# Patient Record
Sex: MALE | Race: WHITE | Employment: OTHER | URBAN - METROPOLITAN AREA
[De-identification: names, ages, dates, MRNs, and addresses within clinical notes are randomized per-mention and may not be internally consistent; named-entity substitution may affect disease eponyms.]

---

## 2018-02-16 PROBLEM — F17.200 TOBACCO USE DISORDER: Status: ACTIVE | Noted: 2018-02-16

## 2018-02-16 PROBLEM — R06.02 SOB (SHORTNESS OF BREATH): Status: ACTIVE | Noted: 2018-02-16

## 2018-02-16 PROBLEM — E78.2 MIXED HYPERLIPIDEMIA: Status: ACTIVE | Noted: 2018-02-16

## 2018-02-16 PROBLEM — R07.9 CHEST PAIN: Status: ACTIVE | Noted: 2018-02-16

## 2018-02-16 PROBLEM — I10 ESSENTIAL HYPERTENSION: Status: ACTIVE | Noted: 2018-02-16

## 2018-02-16 PROBLEM — E11.9 DM (DIABETES MELLITUS), TYPE 2 (HCC): Status: ACTIVE | Noted: 2018-02-16

## 2018-02-16 PROBLEM — I31.39 PERICARDIAL EFFUSION: Status: ACTIVE | Noted: 2018-02-16

## 2018-02-16 PROBLEM — E03.9 HYPOTHYROIDISM: Status: ACTIVE | Noted: 2018-02-16

## 2018-02-16 PROBLEM — E87.1 HYPONATREMIA: Status: ACTIVE | Noted: 2018-02-16

## 2018-02-18 PROBLEM — R07.9 ACUTE CHEST PAIN: Status: RESOLVED | Noted: 2018-02-16 | Resolved: 2018-02-18

## 2018-02-18 PROBLEM — R06.02 SOB (SHORTNESS OF BREATH): Status: RESOLVED | Noted: 2018-02-16 | Resolved: 2018-02-18

## 2018-02-18 PROBLEM — E87.1 HYPONATREMIA: Status: RESOLVED | Noted: 2018-02-16 | Resolved: 2018-02-18

## 2018-03-30 PROBLEM — N39.0 UTI (URINARY TRACT INFECTION): Status: ACTIVE | Noted: 2018-03-30

## 2018-03-30 PROBLEM — A41.9 SEPSIS (HCC): Status: ACTIVE | Noted: 2018-03-30

## 2018-03-30 PROBLEM — N17.9 ARF (ACUTE RENAL FAILURE) (HCC): Status: ACTIVE | Noted: 2018-03-30

## 2018-03-31 PROBLEM — E44.1 MILD MALNUTRITION (HCC): Chronic | Status: ACTIVE | Noted: 2018-03-31

## 2018-04-06 PROBLEM — N05.7 CRESCENTIC GLOMERULONEPHRITIS: Status: ACTIVE | Noted: 2018-04-06

## 2018-04-10 PROBLEM — I31.39 PERICARDIAL EFFUSION: Status: RESOLVED | Noted: 2018-02-16 | Resolved: 2018-04-10

## 2018-04-10 PROBLEM — E44.1 MILD MALNUTRITION (HCC): Chronic | Status: RESOLVED | Noted: 2018-03-31 | Resolved: 2018-04-10

## 2018-04-13 ENCOUNTER — HOSPITAL ENCOUNTER (OUTPATIENT)
Dept: ONCOLOGY | Age: 68
Discharge: HOME | End: 2018-04-13
Attending: INTERNAL MEDICINE | Admitting: INTERNAL MEDICINE

## 2018-04-13 VITALS
TEMPERATURE: 97.1 F | RESPIRATION RATE: 16 BRPM | SYSTOLIC BLOOD PRESSURE: 143 MMHG | DIASTOLIC BLOOD PRESSURE: 79 MMHG | HEART RATE: 87 BPM | WEIGHT: 204 LBS | BODY MASS INDEX: 30.21 KG/M2 | HEIGHT: 69 IN

## 2018-04-13 RX ORDER — DIPHENHYDRAMINE HCL 25 MG
25 TABLET ORAL ONCE
Status: COMPLETED | OUTPATIENT
Start: 2018-04-13 | End: 2018-04-13

## 2018-04-13 RX ORDER — ACETAMINOPHEN 325 MG/1
650 TABLET ORAL ONCE
Status: COMPLETED | OUTPATIENT
Start: 2018-04-13 | End: 2018-04-13

## 2018-04-13 RX ORDER — METHYLPREDNISOLONE SODIUM SUCCINATE 125 MG/2ML
80 INJECTION, POWDER, LYOPHILIZED, FOR SOLUTION INTRAMUSCULAR; INTRAVENOUS ONCE
Status: COMPLETED | OUTPATIENT
Start: 2018-04-13 | End: 2018-04-13

## 2018-04-13 RX ADMIN — ACETAMINOPHEN 650 MG: 325 TABLET ORAL at 08:29

## 2018-04-13 RX ADMIN — Medication 25 MG: at 08:30

## 2018-04-13 RX ADMIN — METHYLPREDNISOLONE SODIUM SUCCINATE 80 MG: 125 INJECTION, POWDER, LYOPHILIZED, FOR SOLUTION INTRAMUSCULAR; INTRAVENOUS at 08:30

## 2018-04-13 ASSESSMENT — PAIN SCALES - GENERAL: PAINLEVEL_OUTOF10: 0

## 2018-04-13 ASSESSMENT — PAIN - FUNCTIONAL ASSESSMENT: PAIN_FUNCTIONAL_ASSESSMENT: 0-10

## 2018-04-20 ENCOUNTER — HOSPITAL ENCOUNTER (OUTPATIENT)
Dept: ONCOLOGY | Age: 68
Discharge: HOME | End: 2018-04-30
Attending: INTERNAL MEDICINE | Admitting: INTERNAL MEDICINE

## 2018-04-20 VITALS
HEART RATE: 96 BPM | TEMPERATURE: 97 F | HEIGHT: 69 IN | RESPIRATION RATE: 18 BRPM | SYSTOLIC BLOOD PRESSURE: 141 MMHG | DIASTOLIC BLOOD PRESSURE: 81 MMHG | WEIGHT: 205 LBS | BODY MASS INDEX: 30.36 KG/M2

## 2018-04-20 LAB
A/G RATIO: 0.8 (ref 1.1–2.2)
ALBUMIN SERPL-MCNC: 2.7 G/DL (ref 3.4–5)
ALP BLD-CCNC: 180 U/L (ref 40–129)
ALT SERPL-CCNC: 26 U/L (ref 10–40)
ANION GAP SERPL CALCULATED.3IONS-SCNC: 13 MMOL/L (ref 3–16)
ANISOCYTOSIS: ABNORMAL
AST SERPL-CCNC: 12 U/L (ref 15–37)
BASOPHILS ABSOLUTE: 0 K/UL (ref 0–0.2)
BASOPHILS RELATIVE PERCENT: 0 %
BILIRUB SERPL-MCNC: 0.5 MG/DL (ref 0–1)
BUN BLDV-MCNC: 80 MG/DL (ref 7–20)
BURR CELLS: ABNORMAL
CALCIUM SERPL-MCNC: 8.6 MG/DL (ref 8.3–10.6)
CHLORIDE BLD-SCNC: 99 MMOL/L (ref 99–110)
CO2: 24 MMOL/L (ref 21–32)
CREAT SERPL-MCNC: 2.9 MG/DL (ref 0.8–1.3)
EOSINOPHILS ABSOLUTE: 0 K/UL (ref 0–0.6)
EOSINOPHILS RELATIVE PERCENT: 0 %
GFR AFRICAN AMERICAN: 26
GFR NON-AFRICAN AMERICAN: 22
GLOBULIN: 3.2 G/DL
GLUCOSE BLD-MCNC: 103 MG/DL (ref 70–99)
HCT VFR BLD CALC: 22.3 % (ref 40.5–52.5)
HEMOGLOBIN: 7.3 G/DL (ref 13.5–17.5)
HOWELL-JOLLY BODIES: ABNORMAL
HYPOCHROMIA: ABNORMAL
LYMPHOCYTES ABSOLUTE: 0.4 K/UL (ref 1–5.1)
LYMPHOCYTES RELATIVE PERCENT: 2 %
MACROCYTES: ABNORMAL
MCH RBC QN AUTO: 26.4 PG (ref 26–34)
MCHC RBC AUTO-ENTMCNC: 33 G/DL (ref 31–36)
MCV RBC AUTO: 80.1 FL (ref 80–100)
MICROCYTES: ABNORMAL
MONOCYTES ABSOLUTE: 0.4 K/UL (ref 0–1.3)
MONOCYTES RELATIVE PERCENT: 2 %
NEUTROPHILS ABSOLUTE: 17.8 K/UL (ref 1.7–7.7)
NEUTROPHILS RELATIVE PERCENT: 96 %
OVALOCYTES: ABNORMAL
PDW BLD-RTO: 20.6 % (ref 12.4–15.4)
PLATELET # BLD: 280 K/UL (ref 135–450)
PLATELET SLIDE REVIEW: ADEQUATE
PMV BLD AUTO: 8 FL (ref 5–10.5)
POIKILOCYTES: ABNORMAL
POLYCHROMASIA: ABNORMAL
POTASSIUM SERPL-SCNC: 4.7 MMOL/L (ref 3.5–5.1)
RBC # BLD: 2.78 M/UL (ref 4.2–5.9)
SCHISTOCYTES: ABNORMAL
SLIDE REVIEW: ABNORMAL
SODIUM BLD-SCNC: 136 MMOL/L (ref 136–145)
TARGET CELLS: ABNORMAL
TOTAL PROTEIN: 5.9 G/DL (ref 6.4–8.2)
WBC # BLD: 18.5 K/UL (ref 4–11)

## 2018-04-20 RX ORDER — METHYLPREDNISOLONE SODIUM SUCCINATE 125 MG/2ML
80 INJECTION, POWDER, LYOPHILIZED, FOR SOLUTION INTRAMUSCULAR; INTRAVENOUS DAILY
Status: DISCONTINUED | OUTPATIENT
Start: 2018-04-20 | End: 2018-04-22 | Stop reason: HOSPADM

## 2018-04-20 RX ORDER — ACETAMINOPHEN 325 MG/1
650 TABLET ORAL ONCE
Status: COMPLETED | OUTPATIENT
Start: 2018-04-20 | End: 2018-04-20

## 2018-04-20 RX ORDER — DIPHENHYDRAMINE HCL 25 MG
25 TABLET ORAL ONCE
Status: COMPLETED | OUTPATIENT
Start: 2018-04-20 | End: 2018-04-20

## 2018-04-20 RX ADMIN — ACETAMINOPHEN 650 MG: 325 TABLET ORAL at 10:56

## 2018-04-20 RX ADMIN — METHYLPREDNISOLONE SODIUM SUCCINATE 80 MG: 125 INJECTION, POWDER, LYOPHILIZED, FOR SOLUTION INTRAMUSCULAR; INTRAVENOUS at 10:56

## 2018-04-20 RX ADMIN — Medication 25 MG: at 10:55

## 2018-04-20 ASSESSMENT — PAIN - FUNCTIONAL ASSESSMENT: PAIN_FUNCTIONAL_ASSESSMENT: 0-10

## 2018-04-20 ASSESSMENT — PAIN SCALES - GENERAL: PAINLEVEL_OUTOF10: 0

## 2018-04-27 ENCOUNTER — HOSPITAL ENCOUNTER (OUTPATIENT)
Dept: ONCOLOGY | Age: 68
Discharge: HOME | End: 2018-04-28
Admitting: INTERNAL MEDICINE

## 2018-04-27 VITALS
RESPIRATION RATE: 16 BRPM | BODY MASS INDEX: 30.36 KG/M2 | HEIGHT: 69 IN | DIASTOLIC BLOOD PRESSURE: 90 MMHG | TEMPERATURE: 97.7 F | WEIGHT: 205 LBS | HEART RATE: 95 BPM | SYSTOLIC BLOOD PRESSURE: 139 MMHG

## 2018-04-27 DIAGNOSIS — N05.7 CRESCENTIC GLOMERULONEPHRITIS: ICD-10-CM

## 2018-04-27 DIAGNOSIS — D63.1 ANEMIA OF CHRONIC RENAL FAILURE, UNSPECIFIED CKD STAGE: ICD-10-CM

## 2018-04-27 DIAGNOSIS — N18.9 ANEMIA OF CHRONIC RENAL FAILURE, UNSPECIFIED CKD STAGE: ICD-10-CM

## 2018-04-27 LAB
A/G RATIO: 0.9 (ref 1.1–2.2)
ALBUMIN SERPL-MCNC: 2.7 G/DL (ref 3.4–5)
ALP BLD-CCNC: 148 U/L (ref 40–129)
ALT SERPL-CCNC: 20 U/L (ref 10–40)
ANION GAP SERPL CALCULATED.3IONS-SCNC: 14 MMOL/L (ref 3–16)
AST SERPL-CCNC: 10 U/L (ref 15–37)
BASOPHILS ABSOLUTE: 0 K/UL (ref 0–0.2)
BASOPHILS RELATIVE PERCENT: 0.1 %
BILIRUB SERPL-MCNC: 0.5 MG/DL (ref 0–1)
BUN BLDV-MCNC: 69 MG/DL (ref 7–20)
CALCIUM SERPL-MCNC: 8.3 MG/DL (ref 8.3–10.6)
CHLORIDE BLD-SCNC: 102 MMOL/L (ref 99–110)
CO2: 23 MMOL/L (ref 21–32)
CREAT SERPL-MCNC: 3.1 MG/DL (ref 0.8–1.3)
EOSINOPHILS ABSOLUTE: 0.1 K/UL (ref 0–0.6)
EOSINOPHILS RELATIVE PERCENT: 0.8 %
FERRITIN: 770.3 NG/ML (ref 30–400)
FOLATE: 14.82 NG/ML (ref 4.78–24.2)
GFR AFRICAN AMERICAN: 24
GFR NON-AFRICAN AMERICAN: 20
GLOBULIN: 2.9 G/DL
GLUCOSE BLD-MCNC: 122 MG/DL (ref 70–99)
HCT VFR BLD CALC: 21.4 % (ref 40.5–52.5)
HEMOGLOBIN: 7 G/DL (ref 13.5–17.5)
IRON SATURATION: 21 % (ref 20–50)
IRON: 45 UG/DL (ref 59–158)
LYMPHOCYTES ABSOLUTE: 1.2 K/UL (ref 1–5.1)
LYMPHOCYTES RELATIVE PERCENT: 11.5 %
MCH RBC QN AUTO: 27.2 PG (ref 26–34)
MCHC RBC AUTO-ENTMCNC: 33 G/DL (ref 31–36)
MCV RBC AUTO: 82.5 FL (ref 80–100)
MONOCYTES ABSOLUTE: 0.7 K/UL (ref 0–1.3)
MONOCYTES RELATIVE PERCENT: 7.1 %
NEUTROPHILS ABSOLUTE: 8.1 K/UL (ref 1.7–7.7)
NEUTROPHILS RELATIVE PERCENT: 80.5 %
PDW BLD-RTO: 24.7 % (ref 12.4–15.4)
PLATELET # BLD: 242 K/UL (ref 135–450)
PMV BLD AUTO: 7.8 FL (ref 5–10.5)
POTASSIUM SERPL-SCNC: 5.5 MMOL/L (ref 3.5–5.1)
RBC # BLD: 2.59 M/UL (ref 4.2–5.9)
SODIUM BLD-SCNC: 139 MMOL/L (ref 136–145)
TOTAL IRON BINDING CAPACITY: 213 UG/DL (ref 260–445)
TOTAL PROTEIN: 5.6 G/DL (ref 6.4–8.2)
VITAMIN B-12: 485 PG/ML (ref 211–911)
WBC # BLD: 10 K/UL (ref 4–11)

## 2018-04-27 RX ORDER — METHYLPREDNISOLONE SODIUM SUCCINATE 125 MG/2ML
80 INJECTION, POWDER, LYOPHILIZED, FOR SOLUTION INTRAMUSCULAR; INTRAVENOUS ONCE
Status: COMPLETED | OUTPATIENT
Start: 2018-04-27 | End: 2018-04-27

## 2018-04-27 RX ORDER — ACETAMINOPHEN 325 MG/1
650 TABLET ORAL ONCE
Status: COMPLETED | OUTPATIENT
Start: 2018-04-27 | End: 2018-04-27

## 2018-04-27 RX ORDER — DIPHENHYDRAMINE HCL 25 MG
25 TABLET ORAL ONCE
Status: COMPLETED | OUTPATIENT
Start: 2018-04-27 | End: 2018-04-27

## 2018-04-27 RX ADMIN — Medication 25 MG: at 09:04

## 2018-04-27 RX ADMIN — METHYLPREDNISOLONE SODIUM SUCCINATE 80 MG: 125 INJECTION, POWDER, LYOPHILIZED, FOR SOLUTION INTRAMUSCULAR; INTRAVENOUS at 09:05

## 2018-04-27 RX ADMIN — ACETAMINOPHEN 650 MG: 325 TABLET ORAL at 09:04

## 2018-04-27 ASSESSMENT — PAIN SCALES - GENERAL
PAINLEVEL_OUTOF10: 0
PAINLEVEL_OUTOF10: 0

## 2018-04-29 PROBLEM — N39.0 UTI (URINARY TRACT INFECTION): Status: RESOLVED | Noted: 2018-03-30 | Resolved: 2018-04-29

## 2018-05-01 ENCOUNTER — HOSPITAL ENCOUNTER (OUTPATIENT)
Dept: ONCOLOGY | Age: 68
Discharge: HOME | End: 2018-05-31
Attending: INTERNAL MEDICINE | Admitting: INTERNAL MEDICINE

## 2018-05-03 PROBLEM — N18.9 CKD (CHRONIC KIDNEY DISEASE): Status: ACTIVE | Noted: 2018-05-03

## 2018-05-03 PROBLEM — I21.4 NSTEMI (NON-ST ELEVATED MYOCARDIAL INFARCTION) (HCC): Status: ACTIVE | Noted: 2018-05-03

## 2018-05-25 ENCOUNTER — HOSPITAL ENCOUNTER (OUTPATIENT)
Dept: ONCOLOGY | Age: 68
Discharge: HOME | End: 2018-05-26
Admitting: INTERNAL MEDICINE

## 2018-05-25 ENCOUNTER — HOSPITAL ENCOUNTER (OUTPATIENT)
Dept: OTHER | Age: 68
Discharge: HOME | End: 2018-05-25
Attending: INTERNAL MEDICINE | Admitting: INTERNAL MEDICINE

## 2018-05-25 VITALS
WEIGHT: 205 LBS | BODY MASS INDEX: 29.35 KG/M2 | HEIGHT: 70 IN | TEMPERATURE: 98.4 F | SYSTOLIC BLOOD PRESSURE: 147 MMHG | RESPIRATION RATE: 18 BRPM | HEART RATE: 64 BPM | DIASTOLIC BLOOD PRESSURE: 76 MMHG

## 2018-05-25 DIAGNOSIS — N18.9 ANEMIA OF CHRONIC RENAL FAILURE, UNSPECIFIED CKD STAGE: ICD-10-CM

## 2018-05-25 DIAGNOSIS — N05.7 CRESCENTIC GLOMERULONEPHRITIS: ICD-10-CM

## 2018-05-25 DIAGNOSIS — D63.1 ANEMIA OF CHRONIC RENAL FAILURE, UNSPECIFIED CKD STAGE: ICD-10-CM

## 2018-05-25 LAB
ALBUMIN SERPL-MCNC: 3.6 G/DL (ref 3.4–5)
ANION GAP SERPL CALCULATED.3IONS-SCNC: 18 MMOL/L (ref 3–16)
BACTERIA: ABNORMAL /HPF
BILIRUBIN URINE: NEGATIVE
BLOOD, URINE: ABNORMAL
BUN BLDV-MCNC: 84 MG/DL (ref 7–20)
CALCIUM SERPL-MCNC: 8.8 MG/DL (ref 8.3–10.6)
CHLORIDE BLD-SCNC: 92 MMOL/L (ref 99–110)
CLARITY: CLEAR
CO2: 27 MMOL/L (ref 21–32)
COLOR: YELLOW
CREAT SERPL-MCNC: 3.1 MG/DL (ref 0.8–1.3)
CREATININE URINE: 35.2 MG/DL (ref 39–259)
EPITHELIAL CELLS, UA: ABNORMAL /HPF
GFR AFRICAN AMERICAN: 24
GFR NON-AFRICAN AMERICAN: 20
GLUCOSE BLD-MCNC: 166 MG/DL (ref 70–99)
GLUCOSE URINE: NEGATIVE MG/DL
HCT VFR BLD CALC: 30.5 % (ref 40.5–52.5)
HEMOGLOBIN: 10.1 G/DL (ref 13.5–17.5)
KETONES, URINE: NEGATIVE MG/DL
LEUKOCYTE ESTERASE, URINE: NEGATIVE
MICROSCOPIC EXAMINATION: YES
NITRITE, URINE: NEGATIVE
PH UA: 5.5
PHOSPHORUS: 3.6 MG/DL (ref 2.5–4.9)
POTASSIUM SERPL-SCNC: 5.3 MMOL/L (ref 3.5–5.1)
PROTEIN PROTEIN: 50 MG/DL
PROTEIN UA: 30 MG/DL
PROTEIN/CREAT RATIO: 1.4 MG/DL
RBC UA: ABNORMAL /HPF (ref 0–2)
SODIUM BLD-SCNC: 137 MMOL/L (ref 136–145)
SPECIFIC GRAVITY UA: 1.01
UROBILINOGEN, URINE: 0.2 E.U./DL
WBC UA: ABNORMAL /HPF (ref 0–5)

## 2018-05-25 ASSESSMENT — PAIN - FUNCTIONAL ASSESSMENT: PAIN_FUNCTIONAL_ASSESSMENT: 0-10

## 2018-06-01 ENCOUNTER — HOSPITAL ENCOUNTER (OUTPATIENT)
Dept: ONCOLOGY | Age: 68
Discharge: HOME | End: 2018-06-30
Attending: INTERNAL MEDICINE | Admitting: INTERNAL MEDICINE

## 2018-06-07 ENCOUNTER — TELEPHONE (OUTPATIENT)
Dept: CARDIOTHORACIC SURGERY | Age: 68
End: 2018-06-07

## 2018-06-08 ENCOUNTER — HOSPITAL ENCOUNTER (OUTPATIENT)
Dept: OTHER | Age: 68
Discharge: HOME | End: 2018-06-08
Attending: INTERNAL MEDICINE | Admitting: INTERNAL MEDICINE

## 2018-06-08 LAB
ALBUMIN SERPL-MCNC: 3.5 G/DL (ref 3.4–5)
ANION GAP SERPL CALCULATED.3IONS-SCNC: 17 MMOL/L (ref 3–16)
BUN BLDV-MCNC: 83 MG/DL (ref 7–20)
CALCIUM SERPL-MCNC: 9 MG/DL (ref 8.3–10.6)
CHLORIDE BLD-SCNC: 97 MMOL/L (ref 99–110)
CO2: 28 MMOL/L (ref 21–32)
CREAT SERPL-MCNC: 2.9 MG/DL (ref 0.8–1.3)
GFR AFRICAN AMERICAN: 26
GFR NON-AFRICAN AMERICAN: 22
GLUCOSE BLD-MCNC: 161 MG/DL (ref 70–99)
PHOSPHORUS: 4.4 MG/DL (ref 2.5–4.9)
POTASSIUM SERPL-SCNC: 6.3 MMOL/L (ref 3.5–5.1)
SODIUM BLD-SCNC: 142 MMOL/L (ref 136–145)

## 2018-06-14 ENCOUNTER — TELEPHONE (OUTPATIENT)
Dept: CARDIOTHORACIC SURGERY | Age: 68
End: 2018-06-14

## 2018-06-14 DIAGNOSIS — R91.8 LUNG NODULES: Primary | ICD-10-CM

## 2018-06-22 ENCOUNTER — HOSPITAL ENCOUNTER (OUTPATIENT)
Dept: ONCOLOGY | Age: 68
Discharge: HOME | End: 2018-06-23
Admitting: INTERNAL MEDICINE

## 2018-06-22 LAB
HCT VFR BLD CALC: 34.8 % (ref 40.5–52.5)
HEMOGLOBIN: 11.7 G/DL (ref 13.5–17.5)

## 2018-06-25 ENCOUNTER — HOSPITAL ENCOUNTER (OUTPATIENT)
Dept: CT IMAGING | Age: 68
Discharge: HOME | End: 2018-06-25
Attending: THORACIC SURGERY (CARDIOTHORACIC VASCULAR SURGERY) | Admitting: THORACIC SURGERY (CARDIOTHORACIC VASCULAR SURGERY)

## 2018-06-25 DIAGNOSIS — R91.1 SOLITARY PULMONARY NODULE: ICD-10-CM

## 2018-06-25 DIAGNOSIS — R91.8 LUNG NODULES: ICD-10-CM

## 2018-07-01 ENCOUNTER — HOSPITAL ENCOUNTER (OUTPATIENT)
Dept: ONCOLOGY | Age: 68
Discharge: HOME | End: 2018-07-02
Attending: INTERNAL MEDICINE | Admitting: INTERNAL MEDICINE

## 2018-07-06 ENCOUNTER — HOSPITAL ENCOUNTER (OUTPATIENT)
Dept: ONCOLOGY | Age: 68
Discharge: HOME | End: 2018-07-07
Admitting: INTERNAL MEDICINE

## 2018-07-06 LAB
ALBUMIN SERPL-MCNC: 3.3 G/DL (ref 3.4–5)
ANION GAP SERPL CALCULATED.3IONS-SCNC: 14 MMOL/L (ref 3–16)
BUN BLDV-MCNC: 54 MG/DL (ref 7–20)
CALCIUM SERPL-MCNC: 9.7 MG/DL (ref 8.3–10.6)
CHLORIDE BLD-SCNC: 94 MMOL/L (ref 99–110)
CO2: 24 MMOL/L (ref 21–32)
CREAT SERPL-MCNC: 3.1 MG/DL (ref 0.8–1.3)
GFR AFRICAN AMERICAN: 24
GFR NON-AFRICAN AMERICAN: 20
GLUCOSE BLD-MCNC: 98 MG/DL (ref 70–99)
HCT VFR BLD CALC: 36 % (ref 40.5–52.5)
HEMOGLOBIN: 11.9 G/DL (ref 13.5–17.5)
PHOSPHORUS: 3.7 MG/DL (ref 2.5–4.9)
POTASSIUM SERPL-SCNC: 4.6 MMOL/L (ref 3.5–5.1)
SODIUM BLD-SCNC: 132 MMOL/L (ref 136–145)

## 2018-07-11 ENCOUNTER — TELEPHONE (OUTPATIENT)
Dept: CARDIOTHORACIC SURGERY | Age: 68
End: 2018-07-11

## 2018-07-20 ENCOUNTER — HOSPITAL ENCOUNTER (OUTPATIENT)
Dept: NURSING | Age: 68
Setting detail: INFUSION SERIES
Discharge: HOME | End: 2018-07-20
Payer: MEDICARE

## 2018-07-20 LAB
HCT VFR BLD CALC: 36.2 % (ref 40.5–52.5)
HEMOGLOBIN: 12.1 G/DL (ref 13.5–17.5)

## 2018-07-20 PROCEDURE — 85018 HEMOGLOBIN: CPT

## 2018-07-20 PROCEDURE — 36415 COLL VENOUS BLD VENIPUNCTURE: CPT

## 2018-07-20 PROCEDURE — 85014 HEMATOCRIT: CPT

## 2018-07-20 PROCEDURE — 99211 OFF/OP EST MAY X REQ PHY/QHP: CPT

## 2018-07-20 NOTE — PROGRESS NOTES
Patient did not need any injection of aranesp per his lab results. Left in stable condition ambulatory.

## 2018-08-03 ENCOUNTER — HOSPITAL ENCOUNTER (OUTPATIENT)
Dept: NURSING | Age: 68
Setting detail: INFUSION SERIES
Discharge: HOME | End: 2018-08-03
Payer: MEDICARE

## 2018-08-03 LAB
ALBUMIN SERPL-MCNC: 3.5 G/DL (ref 3.4–5)
ANION GAP SERPL CALCULATED.3IONS-SCNC: 11 MMOL/L (ref 3–16)
BUN BLDV-MCNC: 41 MG/DL (ref 7–20)
CALCIUM SERPL-MCNC: 9.6 MG/DL (ref 8.3–10.6)
CHLORIDE BLD-SCNC: 104 MMOL/L (ref 99–110)
CO2: 26 MMOL/L (ref 21–32)
CREAT SERPL-MCNC: 2.8 MG/DL (ref 0.8–1.3)
FERRITIN: 303.3 NG/ML (ref 30–400)
GFR AFRICAN AMERICAN: 27
GFR NON-AFRICAN AMERICAN: 23
GLUCOSE BLD-MCNC: 148 MG/DL (ref 70–99)
HCT VFR BLD CALC: 36.5 % (ref 40.5–52.5)
HEMOGLOBIN: 11.9 G/DL (ref 13.5–17.5)
IRON SATURATION: 24 % (ref 20–50)
IRON: 57 UG/DL (ref 59–158)
PHOSPHORUS: 4 MG/DL (ref 2.5–4.9)
POTASSIUM SERPL-SCNC: 4.8 MMOL/L (ref 3.5–5.1)
SODIUM BLD-SCNC: 141 MMOL/L (ref 136–145)
TOTAL IRON BINDING CAPACITY: 242 UG/DL (ref 260–445)

## 2018-08-03 PROCEDURE — 85014 HEMATOCRIT: CPT

## 2018-08-03 PROCEDURE — 80069 RENAL FUNCTION PANEL: CPT

## 2018-08-03 PROCEDURE — 85018 HEMOGLOBIN: CPT

## 2018-08-03 PROCEDURE — 99211 OFF/OP EST MAY X REQ PHY/QHP: CPT

## 2018-08-03 PROCEDURE — 83540 ASSAY OF IRON: CPT

## 2018-08-03 PROCEDURE — 36415 COLL VENOUS BLD VENIPUNCTURE: CPT

## 2018-08-03 PROCEDURE — 83550 IRON BINDING TEST: CPT

## 2018-08-03 PROCEDURE — 82728 ASSAY OF FERRITIN: CPT

## 2018-08-03 NOTE — PROGRESS NOTES
Hemoglobin 11.9 no aranesp today per orders patient informed and instructed to make appointment for 2 weeks from now.  Discharged in stable condition

## 2018-08-17 ENCOUNTER — HOSPITAL ENCOUNTER (OUTPATIENT)
Dept: NURSING | Age: 68
Setting detail: INFUSION SERIES
Discharge: HOME | End: 2018-08-17
Payer: MEDICARE

## 2018-08-17 LAB
HCT VFR BLD CALC: 36.3 % (ref 40.5–52.5)
HEMOGLOBIN: 11.9 G/DL (ref 13.5–17.5)

## 2018-08-17 PROCEDURE — 99211 OFF/OP EST MAY X REQ PHY/QHP: CPT

## 2018-08-17 PROCEDURE — 36415 COLL VENOUS BLD VENIPUNCTURE: CPT

## 2018-08-17 PROCEDURE — 85014 HEMATOCRIT: CPT

## 2018-08-17 PROCEDURE — 85018 HEMOGLOBIN: CPT

## 2018-08-17 NOTE — PROGRESS NOTES
Injection of aranesp not needed. Will schedule his next appointment in 2 weeks. Left ambulatory in stable condition.

## 2018-08-31 ENCOUNTER — HOSPITAL ENCOUNTER (OUTPATIENT)
Dept: NURSING | Age: 68
Setting detail: INFUSION SERIES
Discharge: HOME | End: 2018-08-31
Payer: MEDICARE

## 2018-08-31 LAB
ALBUMIN SERPL-MCNC: 3.5 G/DL (ref 3.4–5)
ANION GAP SERPL CALCULATED.3IONS-SCNC: 12 MMOL/L (ref 3–16)
BUN BLDV-MCNC: 36 MG/DL (ref 7–20)
CALCIUM SERPL-MCNC: 9.5 MG/DL (ref 8.3–10.6)
CHLORIDE BLD-SCNC: 101 MMOL/L (ref 99–110)
CO2: 26 MMOL/L (ref 21–32)
CREAT SERPL-MCNC: 2.7 MG/DL (ref 0.8–1.3)
GFR AFRICAN AMERICAN: 28
GFR NON-AFRICAN AMERICAN: 24
GLUCOSE BLD-MCNC: 187 MG/DL (ref 70–99)
HCT VFR BLD CALC: 37 % (ref 40.5–52.5)
HEMOGLOBIN: 12.1 G/DL (ref 13.5–17.5)
PHOSPHORUS: 3.7 MG/DL (ref 2.5–4.9)
POTASSIUM SERPL-SCNC: 4.7 MMOL/L (ref 3.5–5.1)
SODIUM BLD-SCNC: 139 MMOL/L (ref 136–145)

## 2018-08-31 PROCEDURE — 85014 HEMATOCRIT: CPT

## 2018-08-31 PROCEDURE — 36415 COLL VENOUS BLD VENIPUNCTURE: CPT

## 2018-08-31 PROCEDURE — 85018 HEMOGLOBIN: CPT

## 2018-08-31 PROCEDURE — 80069 RENAL FUNCTION PANEL: CPT

## 2018-08-31 PROCEDURE — 99211 OFF/OP EST MAY X REQ PHY/QHP: CPT

## 2018-08-31 NOTE — PROGRESS NOTES
Pt hemoglobin 12.1 no aranesp given as ordered. Patient instructed to call physician hasn't needed an injection x 3 months.  Verbalizes understanding discharged to home in stable condition

## 2022-04-06 ENCOUNTER — HOSPITAL ENCOUNTER (OUTPATIENT)
Age: 72
Discharge: HOME | End: 2022-04-06
Payer: MEDICARE

## 2022-04-06 ENCOUNTER — HOSPITAL ENCOUNTER (OUTPATIENT)
Dept: GENERAL RADIOLOGY | Age: 72
Discharge: HOME | End: 2022-04-06
Payer: MEDICARE

## 2022-04-06 DIAGNOSIS — N18.5 CHRONIC KIDNEY DISEASE, STAGE V (HCC): ICD-10-CM

## 2022-04-06 PROCEDURE — 87340 HEPATITIS B SURFACE AG IA: CPT

## 2022-04-06 PROCEDURE — 86706 HEP B SURFACE ANTIBODY: CPT

## 2022-04-06 PROCEDURE — 71046 X-RAY EXAM CHEST 2 VIEWS: CPT

## 2022-04-06 PROCEDURE — 86704 HEP B CORE ANTIBODY TOTAL: CPT

## 2022-04-07 LAB
HBV SURFACE AB TITR SER: <3.5 MIU/ML
HEPATITIS B SURFACE ANTIGEN INTERPRETATION: NORMAL

## 2022-04-08 LAB — HEPATITIS B CORE TOTAL ANTIBODY: NEGATIVE

## 2022-05-17 ENCOUNTER — HOSPITAL ENCOUNTER (INPATIENT)
Age: 72
LOS: 9 days | Discharge: NURSING, REHAB OR LONG-TERM CARE | DRG: 280 | End: 2022-05-26
Attending: EMERGENCY MEDICINE | Admitting: INTERNAL MEDICINE
Payer: MEDICARE

## 2022-05-17 ENCOUNTER — APPOINTMENT (OUTPATIENT)
Dept: CT IMAGING | Age: 72
DRG: 280 | End: 2022-05-17
Payer: MEDICARE

## 2022-05-17 ENCOUNTER — APPOINTMENT (OUTPATIENT)
Dept: GENERAL RADIOLOGY | Age: 72
DRG: 280 | End: 2022-05-17
Payer: MEDICARE

## 2022-05-17 DIAGNOSIS — I21.4 NSTEMI (NON-ST ELEVATED MYOCARDIAL INFARCTION) (HCC): Primary | ICD-10-CM

## 2022-05-17 DIAGNOSIS — D64.9 ANEMIA, UNSPECIFIED TYPE: ICD-10-CM

## 2022-05-17 DIAGNOSIS — E87.1 HYPONATREMIA: ICD-10-CM

## 2022-05-17 DIAGNOSIS — R53.1 GENERALIZED WEAKNESS: ICD-10-CM

## 2022-05-17 PROBLEM — R77.8 TROPONIN LEVEL ELEVATED: Status: ACTIVE | Noted: 2022-05-17

## 2022-05-17 PROBLEM — N18.5 CHRONIC KIDNEY DISEASE, STAGE 5 (HCC): Status: ACTIVE | Noted: 2018-05-03

## 2022-05-17 LAB
A/G RATIO: 0.5 (ref 1.1–2.2)
ABO/RH: NORMAL
ALBUMIN SERPL-MCNC: 2 G/DL (ref 3.4–5)
ALP BLD-CCNC: 402 U/L (ref 40–129)
ALT SERPL-CCNC: 17 U/L (ref 10–40)
ANION GAP SERPL CALCULATED.3IONS-SCNC: 8 MMOL/L (ref 3–16)
ANTIBODY SCREEN: NORMAL
APTT: 24 SEC (ref 26.2–38.6)
AST SERPL-CCNC: 27 U/L (ref 15–37)
BASOPHILS ABSOLUTE: 0.1 K/UL (ref 0–0.2)
BASOPHILS RELATIVE PERCENT: 0.6 %
BILIRUB SERPL-MCNC: 0.5 MG/DL (ref 0–1)
BUN BLDV-MCNC: 11 MG/DL (ref 7–20)
CALCIUM SERPL-MCNC: 7.9 MG/DL (ref 8.3–10.6)
CHLORIDE BLD-SCNC: 91 MMOL/L (ref 99–110)
CO2: 28 MMOL/L (ref 21–32)
CREAT SERPL-MCNC: 3.7 MG/DL (ref 0.8–1.3)
EOSINOPHILS ABSOLUTE: 0.4 K/UL (ref 0–0.6)
EOSINOPHILS RELATIVE PERCENT: 2.7 %
GFR AFRICAN AMERICAN: 20
GFR NON-AFRICAN AMERICAN: 16
GLUCOSE BLD-MCNC: 111 MG/DL (ref 70–99)
HCT VFR BLD CALC: 23.1 % (ref 40.5–52.5)
HEMOGLOBIN: 7.5 G/DL (ref 13.5–17.5)
LIPASE: 12 U/L (ref 13–60)
LYMPHOCYTES ABSOLUTE: 0.9 K/UL (ref 1–5.1)
LYMPHOCYTES RELATIVE PERCENT: 5.6 %
MAGNESIUM: 1.9 MG/DL (ref 1.8–2.4)
MCH RBC QN AUTO: 27.2 PG (ref 26–34)
MCHC RBC AUTO-ENTMCNC: 32.6 G/DL (ref 31–36)
MCV RBC AUTO: 83.5 FL (ref 80–100)
MONOCYTES ABSOLUTE: 1 K/UL (ref 0–1.3)
MONOCYTES RELATIVE PERCENT: 6.1 %
NEUTROPHILS ABSOLUTE: 13.4 K/UL (ref 1.7–7.7)
NEUTROPHILS RELATIVE PERCENT: 85 %
OCCULT BLOOD SCREENING: NORMAL
PDW BLD-RTO: 15.2 % (ref 12.4–15.4)
PLATELET # BLD: 354 K/UL (ref 135–450)
PMV BLD AUTO: 6.9 FL (ref 5–10.5)
POTASSIUM REFLEX MAGNESIUM: 3.1 MMOL/L (ref 3.5–5.1)
RBC # BLD: 2.77 M/UL (ref 4.2–5.9)
SARS-COV-2, NAAT: NOT DETECTED
SODIUM BLD-SCNC: 127 MMOL/L (ref 136–145)
SPECIMEN STATUS: NORMAL
TOTAL PROTEIN: 6 G/DL (ref 6.4–8.2)
TROPONIN: 0.52 NG/ML
TROPONIN: 0.54 NG/ML
TROPONIN: 0.55 NG/ML
WBC # BLD: 15.8 K/UL (ref 4–11)

## 2022-05-17 PROCEDURE — 6370000000 HC RX 637 (ALT 250 FOR IP): Performed by: INTERNAL MEDICINE

## 2022-05-17 PROCEDURE — 86900 BLOOD TYPING SEROLOGIC ABO: CPT

## 2022-05-17 PROCEDURE — 99285 EMERGENCY DEPT VISIT HI MDM: CPT

## 2022-05-17 PROCEDURE — 2580000003 HC RX 258: Performed by: EMERGENCY MEDICINE

## 2022-05-17 PROCEDURE — 87635 SARS-COV-2 COVID-19 AMP PRB: CPT

## 2022-05-17 PROCEDURE — 6360000002 HC RX W HCPCS: Performed by: EMERGENCY MEDICINE

## 2022-05-17 PROCEDURE — 82746 ASSAY OF FOLIC ACID SERUM: CPT

## 2022-05-17 PROCEDURE — P9016 RBC LEUKOCYTES REDUCED: HCPCS

## 2022-05-17 PROCEDURE — 84443 ASSAY THYROID STIM HORMONE: CPT

## 2022-05-17 PROCEDURE — 70450 CT HEAD/BRAIN W/O DYE: CPT

## 2022-05-17 PROCEDURE — 86901 BLOOD TYPING SEROLOGIC RH(D): CPT

## 2022-05-17 PROCEDURE — 96361 HYDRATE IV INFUSION ADD-ON: CPT

## 2022-05-17 PROCEDURE — 82607 VITAMIN B-12: CPT

## 2022-05-17 PROCEDURE — 6370000000 HC RX 637 (ALT 250 FOR IP): Performed by: PHYSICIAN ASSISTANT

## 2022-05-17 PROCEDURE — 84484 ASSAY OF TROPONIN QUANT: CPT

## 2022-05-17 PROCEDURE — 71045 X-RAY EXAM CHEST 1 VIEW: CPT

## 2022-05-17 PROCEDURE — 80053 COMPREHEN METABOLIC PANEL: CPT

## 2022-05-17 PROCEDURE — 83690 ASSAY OF LIPASE: CPT

## 2022-05-17 PROCEDURE — 82270 OCCULT BLOOD FECES: CPT

## 2022-05-17 PROCEDURE — 86923 COMPATIBILITY TEST ELECTRIC: CPT

## 2022-05-17 PROCEDURE — 96365 THER/PROPH/DIAG IV INF INIT: CPT

## 2022-05-17 PROCEDURE — 36415 COLL VENOUS BLD VENIPUNCTURE: CPT

## 2022-05-17 PROCEDURE — 93005 ELECTROCARDIOGRAM TRACING: CPT | Performed by: INTERNAL MEDICINE

## 2022-05-17 PROCEDURE — 85730 THROMBOPLASTIN TIME PARTIAL: CPT

## 2022-05-17 PROCEDURE — 83735 ASSAY OF MAGNESIUM: CPT

## 2022-05-17 PROCEDURE — 85025 COMPLETE CBC W/AUTO DIFF WBC: CPT

## 2022-05-17 PROCEDURE — 84439 ASSAY OF FREE THYROXINE: CPT

## 2022-05-17 PROCEDURE — 93005 ELECTROCARDIOGRAM TRACING: CPT | Performed by: PHYSICIAN ASSISTANT

## 2022-05-17 PROCEDURE — 2060000000 HC ICU INTERMEDIATE R&B

## 2022-05-17 PROCEDURE — 2580000003 HC RX 258: Performed by: INTERNAL MEDICINE

## 2022-05-17 PROCEDURE — 86850 RBC ANTIBODY SCREEN: CPT

## 2022-05-17 RX ORDER — ACETAMINOPHEN 325 MG/1
650 TABLET ORAL EVERY 6 HOURS PRN
Status: DISCONTINUED | OUTPATIENT
Start: 2022-05-17 | End: 2022-05-26 | Stop reason: HOSPADM

## 2022-05-17 RX ORDER — HEPARIN SODIUM 10000 [USP'U]/100ML
5-30 INJECTION, SOLUTION INTRAVENOUS CONTINUOUS
Status: DISCONTINUED | OUTPATIENT
Start: 2022-05-17 | End: 2022-05-17

## 2022-05-17 RX ORDER — HEPARIN SODIUM 10000 [USP'U]/100ML
5-25 INJECTION, SOLUTION INTRAVENOUS CONTINUOUS
Status: DISCONTINUED | OUTPATIENT
Start: 2022-05-17 | End: 2022-05-17 | Stop reason: SDUPTHER

## 2022-05-17 RX ORDER — 0.9 % SODIUM CHLORIDE 0.9 %
500 INTRAVENOUS SOLUTION INTRAVENOUS ONCE
Status: COMPLETED | OUTPATIENT
Start: 2022-05-17 | End: 2022-05-17

## 2022-05-17 RX ORDER — ACETAMINOPHEN 650 MG/1
650 SUPPOSITORY RECTAL EVERY 6 HOURS PRN
Status: DISCONTINUED | OUTPATIENT
Start: 2022-05-17 | End: 2022-05-26 | Stop reason: HOSPADM

## 2022-05-17 RX ORDER — DOXAZOSIN 2 MG/1
1 TABLET ORAL DAILY
Status: DISCONTINUED | OUTPATIENT
Start: 2022-05-18 | End: 2022-05-26 | Stop reason: HOSPADM

## 2022-05-17 RX ORDER — AMLODIPINE BESYLATE 5 MG/1
10 TABLET ORAL DAILY
Status: DISCONTINUED | OUTPATIENT
Start: 2022-05-18 | End: 2022-05-22

## 2022-05-17 RX ORDER — ATORVASTATIN CALCIUM 10 MG/1
20 TABLET, FILM COATED ORAL DAILY
Status: DISCONTINUED | OUTPATIENT
Start: 2022-05-18 | End: 2022-05-26 | Stop reason: HOSPADM

## 2022-05-17 RX ORDER — CARVEDILOL 3.12 MG/1
3.12 TABLET ORAL 2 TIMES DAILY WITH MEALS
Status: DISCONTINUED | OUTPATIENT
Start: 2022-05-18 | End: 2022-05-26 | Stop reason: HOSPADM

## 2022-05-17 RX ORDER — ASPIRIN 81 MG/1
81 TABLET, CHEWABLE ORAL DAILY
Status: DISCONTINUED | OUTPATIENT
Start: 2022-05-18 | End: 2022-05-25

## 2022-05-17 RX ORDER — SODIUM CHLORIDE 0.9 % (FLUSH) 0.9 %
5-40 SYRINGE (ML) INJECTION PRN
Status: DISCONTINUED | OUTPATIENT
Start: 2022-05-17 | End: 2022-05-26 | Stop reason: HOSPADM

## 2022-05-17 RX ORDER — SODIUM CHLORIDE 9 MG/ML
INJECTION, SOLUTION INTRAVENOUS PRN
Status: DISCONTINUED | OUTPATIENT
Start: 2022-05-17 | End: 2022-05-26 | Stop reason: HOSPADM

## 2022-05-17 RX ORDER — ASPIRIN 325 MG
325 TABLET ORAL ONCE
Status: COMPLETED | OUTPATIENT
Start: 2022-05-17 | End: 2022-05-17

## 2022-05-17 RX ORDER — ONDANSETRON 2 MG/ML
4 INJECTION INTRAMUSCULAR; INTRAVENOUS EVERY 6 HOURS PRN
Status: DISCONTINUED | OUTPATIENT
Start: 2022-05-17 | End: 2022-05-26 | Stop reason: HOSPADM

## 2022-05-17 RX ORDER — SEVELAMER CARBONATE 800 MG/1
1 TABLET, FILM COATED ORAL
COMMUNITY

## 2022-05-17 RX ORDER — POLYETHYLENE GLYCOL 3350 17 G/17G
17 POWDER, FOR SOLUTION ORAL DAILY PRN
Status: DISCONTINUED | OUTPATIENT
Start: 2022-05-17 | End: 2022-05-26 | Stop reason: HOSPADM

## 2022-05-17 RX ORDER — HEPARIN SODIUM 10000 [USP'U]/100ML
1650 INJECTION, SOLUTION INTRAVENOUS CONTINUOUS
Status: DISCONTINUED | OUTPATIENT
Start: 2022-05-17 | End: 2022-05-19

## 2022-05-17 RX ORDER — HEPARIN SODIUM 1000 [USP'U]/ML
4000 INJECTION, SOLUTION INTRAVENOUS; SUBCUTANEOUS PRN
Status: DISCONTINUED | OUTPATIENT
Start: 2022-05-17 | End: 2022-05-19

## 2022-05-17 RX ORDER — SODIUM CHLORIDE 0.9 % (FLUSH) 0.9 %
5-40 SYRINGE (ML) INJECTION EVERY 12 HOURS SCHEDULED
Status: DISCONTINUED | OUTPATIENT
Start: 2022-05-17 | End: 2022-05-26 | Stop reason: HOSPADM

## 2022-05-17 RX ORDER — CALCIUM CARBONATE 500(1250)
500 TABLET ORAL DAILY
Status: DISCONTINUED | OUTPATIENT
Start: 2022-05-18 | End: 2022-05-26 | Stop reason: HOSPADM

## 2022-05-17 RX ORDER — HEPARIN SODIUM 1000 [USP'U]/ML
2000 INJECTION, SOLUTION INTRAVENOUS; SUBCUTANEOUS PRN
Status: DISCONTINUED | OUTPATIENT
Start: 2022-05-17 | End: 2022-05-19

## 2022-05-17 RX ORDER — POTASSIUM CHLORIDE 20 MEQ/1
40 TABLET, EXTENDED RELEASE ORAL ONCE
Status: COMPLETED | OUTPATIENT
Start: 2022-05-17 | End: 2022-05-17

## 2022-05-17 RX ORDER — ONDANSETRON 4 MG/1
4 TABLET, ORALLY DISINTEGRATING ORAL EVERY 8 HOURS PRN
Status: DISCONTINUED | OUTPATIENT
Start: 2022-05-17 | End: 2022-05-26 | Stop reason: HOSPADM

## 2022-05-17 RX ORDER — HEPARIN SODIUM 1000 [USP'U]/ML
4000 INJECTION, SOLUTION INTRAVENOUS; SUBCUTANEOUS ONCE
Status: COMPLETED | OUTPATIENT
Start: 2022-05-17 | End: 2022-05-17

## 2022-05-17 RX ORDER — NITROGLYCERIN 0.4 MG/1
0.4 TABLET SUBLINGUAL EVERY 5 MIN PRN
Status: DISCONTINUED | OUTPATIENT
Start: 2022-05-17 | End: 2022-05-26 | Stop reason: HOSPADM

## 2022-05-17 RX ADMIN — POTASSIUM CHLORIDE 40 MEQ: 20 TABLET, EXTENDED RELEASE ORAL at 17:39

## 2022-05-17 RX ADMIN — ASPIRIN 325 MG: 325 TABLET ORAL at 20:53

## 2022-05-17 RX ADMIN — HEPARIN SODIUM 4000 UNITS: 1000 INJECTION INTRAVENOUS; SUBCUTANEOUS at 19:25

## 2022-05-17 RX ADMIN — SODIUM CHLORIDE 500 ML: 9 INJECTION, SOLUTION INTRAVENOUS at 17:19

## 2022-05-17 RX ADMIN — HEPARIN SODIUM 900 UNITS/HR: 10000 INJECTION, SOLUTION INTRAVENOUS at 19:25

## 2022-05-17 RX ADMIN — SODIUM CHLORIDE, PRESERVATIVE FREE 10 ML: 5 INJECTION INTRAVENOUS at 22:17

## 2022-05-17 ASSESSMENT — ENCOUNTER SYMPTOMS
DIARRHEA: 0
ABDOMINAL PAIN: 0
COUGH: 0
SHORTNESS OF BREATH: 0
VOMITING: 0
BACK PAIN: 0
EYES NEGATIVE: 1
BLOOD IN STOOL: 0

## 2022-05-17 ASSESSMENT — PAIN - FUNCTIONAL ASSESSMENT: PAIN_FUNCTIONAL_ASSESSMENT: NONE - DENIES PAIN

## 2022-05-17 NOTE — ED PROVIDER NOTES
I independently performed a history and physical on Cydney Joshi. All diagnostic, treatment, and disposition decisions were made by myself in conjunction with the advanced practice provider. I personally saw the patient and performed a substantive portion of the visit including all aspects of the medical decision making. For further details of Bekah Cortes emergency department encounter, please see HILDA Garibay's documentation. Patient was evaluated due to worsening generalized weakness today to the point where he cannot get up after dialysis although denied any actual fall or hitting his head and was let down gently without any reported trauma. His wife does state that he is becoming more more weak over the last 6 weeks or so since starting dialysis. He denies any blood in the stool. He is able to make some urine although reports poor output. He denies any vomiting or diarrhea. He has no appetite per wife. He has had other falls recently although again denies ever hitting his head or passing out related to these falls. Due to worsening generalized weakness, he came by EMS to the ED for further evaluation. Any exertion makes his symptoms worse. Physical:   Gen: No acute distress. AOx3. Ill-appearing.   Pallor  Psych: Depressed mood and affect  HEENT: NCAT, PERRL, dry MM  Neck: supple, normal range of motion, nontender to palpation  Cardiac: RRR, pulses 2+ in upper extremities  Lungs: C2AB, no R/R/W  Abdomen: soft and nontender with no R/D/G  Neuro: Strength 4+ out of 5 involving bilateral  strength in bilateral leg extension, normal sensation to all 4 extremities  MSK: Normal range of motion of bilateral shoulders, elbows, wrists, hips, knees, ankles and nontender to palpation of all joints       The Ekg interpreted by me shows  Sinus rhythm with first-degree AV block with occasional PVCs and PACs noted with a rate of 77  Axis is   Normal  QTc is  within an acceptable range ST Segments: nonspecific changes  Significant change from prior EKG dated - 6/8/18  No STEMI, RBBB present today which is new compared to old EKG, no signs of Brugada syndrome currently     Critical Care Time:    I personally saw the patient and independently provided 45 minutes of non-concurrent critical care out of a total shared critical care time provided. Includes repeat examinations, lab interpretation, speaking to consultants, charting, treating for NSTEMI and anemia requiring blood transfusion and heparin due to concern for severe anemia causing NSTEMI   Excludes separate billable procedures. Patient at risk for serious decompensation if not treated for this life-threatening condition. MDM: Patient was evaluated due to concern for worsening generalized weakness and fatigue over the last 6 weeks although denying any chest pain or shortness of breath. He did have elevated troponin which could be related to dialysis but based on symptoms, along with significant anemia, we did ultimately start the patient on heparin along with giving a blood transfusion since his occult blood was negative. I did speak to cardiology who recommended heparin as well. He was admitted to stepdown unit and stable condition and agreed with this plan. I did discuss risk versus benefit of blood transfusion with the patient and his wife and he did ultimately agree to the transfusion.        Xiomara Pemberton MD  05/18/22 4576

## 2022-05-17 NOTE — ED PROVIDER NOTES
[facility]  ED  EMERGENCY DEPARTMENT ENCOUNTER        Pt Name: Dat Frank  MRN: [id number]  [place] 1950  Date of evaluation: 5/17/2022  Provider: Ton Rabago PA-C  PCP: Fara Salcedo MD  ED Attending: Jen Read MD      This patient was seen by the attending provider Jen Read MD    History provided by the patient    CHIEF COMPLAINT:     Chief Complaint   Patient presents with    Fatigue     \"my wife couldn't keep me off the floor,\" multiple recent falls, denies striking head today, on blood thinners, started dialysis 3 weeks ago, poor urine output       HISTORY OF PRESENT ILLNESS:      Dat Frank is a 66 y.o. male who arrives to the ED by EMS from home. Patient is accompanied by wife. He has a past medical history that includes but is not limited to hypertension, hyperlipidemia, type 2 diabetes, anemia, chronic kidney disease-now on dialysis for the last 6 weeks. Patient has been noted to become progressively weaker and weaker over the last few weeks. He is lost weight. He has no appetite. He is finding it harder to ambulate independently and has recently started using a walker. He went to dialysis today and upon leaving dialysis was not able to get into the car. He needed help by staff. When he got back home, he was not able to walk up a step into the house and had a controlled fall to the ground with his wife standing behind him. He did not suffer any injury. EMS were called. On arrival, patient is extremely weak and pale but awake, alert and oriented. Nursing Notes were reviewed     REVIEW OF SYSTEMS:     Review of Systems   Constitutional: Positive for activity change, appetite change, fatigue and unexpected weight change. Negative for fever. HENT: Negative. Eyes: Negative. Respiratory: Negative for cough and shortness of breath. Cardiovascular: Negative for chest pain.    Gastrointestinal: Negative for abdominal pain, blood in stool, diarrhea and vomiting. Genitourinary: Negative. Musculoskeletal: Positive for gait problem. Negative for back pain and neck pain. Skin: Positive for pallor. Neurological: Positive for weakness (generalized). Negative for dizziness and headaches. All other systems reviewed and are negative. Except as noted above in the ROS, all other systems were reviewed and negative. PAST MEDICAL HISTORY:     Past Medical History:   Diagnosis Date    Anemia     Chronic kidney disease     Diabetes mellitus ([facility] [address]     Hyperlipidemia     Hypertension          SURGICAL HISTORY:    No past surgical history on file. CURRENT MEDICATIONS:       Previous Medications    ASPIRIN 81 MG CHEWABLE TABLET    Take 1 tablet by mouth daily    ATORVASTATIN (LIPITOR) 20 MG TABLET    Take 1 tablet by mouth daily    CALCIUM ELEMENTAL (OSCAL) 500 MG TABS TABLET    Take 1 tablet by mouth daily    CARVEDILOL (COREG) 3.125 MG TABLET    Take 1 tablet by mouth 2 times daily (with meals)    DARBEPOETIN VAHID-POLYSORBATE (ARANESP, ALB FREE, SURECLICK IJ)    Inject as directed Pt's wife unsure of dosage. Pt had infusion on 4/27.     DOXAZOSIN (CARDURA) 2 MG TABLET    Take 1 tablet by mouth every 12 hours    FUROSEMIDE (LASIX) 40 MG TABLET    Take 1 tablet by mouth daily    INSULIN GLARGINE (LANTUS SOLOSTAR) 100 UNIT/ML INJECTION PEN    Inject 25 Units into the skin nightly    INSULIN LISPRO (HUMALOG KWIKPEN) 100 UNIT/ML PEN    AC:  No Insulin, 140-199 3 Units, 200-249 6 Units, 250-299 9 Units, 300-349 12 Units, 350-400 15 Units, 400+ 18 Units  HS:  No Insulin, 140-199 2 Units, 200-249 3 Units, 250-299 5 Units, 300-349 6 Units, 350-400 7 Units, 400+ 9 Units  Anticipated 24 hr use: up to 66 units    INSULIN PEN NEEDLE 32G X 5 MM MISC    1 each by Does not apply route 5 times daily    LEVOTHYROXINE (SYNTHROID) 125 MCG TABLET    Take 137 mcg by mouth Daily     NITROGLYCERIN (NITROSTAT) 0.4 MG SL TABLET    up to max of Intimate Partner Violence:     Fear of Current or Ex-Partner: Not on file    Emotionally Abused: Not on file    Physically Abused: Not on file    Sexually Abused: Not on file   Housing Stability:     Unable to Pay for Housing in the Last Year: Not on file    Number of Vonnie in the Last Year: Not on file    Unstable Housing in the Last Year: Not on file       SCREENINGS:    Telford Coma Scale  Eye Opening: Spontaneous  Best Verbal Response: Oriented  Best Motor Response: Obeys commands  Telford Coma Scale Score: 15        PHYSICAL EXAM:       ED Triage Vitals [05/17/22 1538]   BP Temp Temp Source Pulse Resp SpO2 Height Weight   118/64 98 °F (36.7 °C) Oral 76 14 91 % 5' 8\" (1.727 m) 187 lb (84.8 kg)       Physical Exam    CONSTITUTIONAL: Awake and alert. Cooperative. Well-developed. Well-nourished. Pale and ill appearing. No acute distress. HENT: Normocephalic. Atraumatic. External ears normal, without discharge. No nasal discharge. Oropharynx clear. Mucous membranes moist.  EYES: Conjunctiva non-injected. No scleral icterus. PERRL. EOM's grossly intact. NECK: Supple. Normal ROM. CARDIOVASCULAR: RRR. No Murmer. Intact distal pulses. PULMONARY/CHEST WALL: Effort normal. No tachypnea. Lungs clear to ausculation. ABDOMEN: Normal BS. Soft. Nondistended. No tenderness to palpate. No guarding. /ANORECTAL: Rectal exam reveals light brown stool that is guaiac negative  MUSKULOSKELETAL: Normal ROM. No acute deformities. No edema. No tenderness to palpate. SKIN: Warm and dry. No rash. Pale  NEUROLOGICAL: Alert and oriented x 3. GCS 15. CN II-XII grossly intact. Strength is 5/5 in all extremities and sensation is intact.    PSYCHIATRIC: Normal affect        DIAGNOSTICRESULTS:     LABS:    Results for orders placed or performed during the hospital encounter of 05/17/22   COVID-19, Rapid    Specimen: Nasopharyngeal Swab; Throat swab   Result Value Ref Range    SARS-CoV-2, NAAT Not Detected Not Detected CBC with Auto Differential   Result Value Ref Range    WBC 15.8 (H) 4.0 - 11.0 K/uL    RBC 2.77 (L) 4.20 - 5.90 M/uL    Hemoglobin 7.5 (L) 13.5 - 17.5 g/dL    Hematocrit 23.1 (L) 40.5 - 52.5 %    MCV 83.5 80.0 - 100.0 fL    MCH 27.2 26.0 - 34.0 pg    MCHC 32.6 31.0 - 36.0 g/dL    RDW 15.2 12.4 - 15.4 %    Platelets 493 486 - 316 K/uL    MPV 6.9 5.0 - 10.5 fL    Neutrophils % 85.0 %    Lymphocytes % 5.6 %    Monocytes % 6.1 %    Eosinophils % 2.7 %    Basophils % 0.6 %    Neutrophils Absolute 13.4 (H) 1.7 - 7.7 K/uL    Lymphocytes Absolute 0.9 (L) 1.0 - 5.1 K/uL    Monocytes Absolute 1.0 0.0 - 1.3 K/uL    Eosinophils Absolute 0.4 0.0 - 0.6 K/uL    Basophils Absolute 0.1 0.0 - 0.2 K/uL   Comprehensive Metabolic Panel w/ Reflex to MG   Result Value Ref Range    Sodium 127 (L) 136 - 145 mmol/L    Potassium reflex Magnesium 3.1 (L) 3.5 - 5.1 mmol/L    Chloride 91 (L) 99 - 110 mmol/L    CO2 28 21 - 32 mmol/L    Anion Gap 8 3 - 16    Glucose 111 (H) 70 - 99 mg/dL    BUN 11 7 - 20 mg/dL    CREATININE 3.7 (H) 0.8 - 1.3 mg/dL    GFR Non- 16 (A) >60    GFR  20 (A) >60    Calcium 7.9 (L) 8.3 - 10.6 mg/dL    Total Protein 6.0 (L) 6.4 - 8.2 g/dL    Albumin 2.0 (L) 3.4 - 5.0 g/dL    Albumin/Globulin Ratio 0.5 (L) 1.1 - 2.2    Total Bilirubin 0.5 0.0 - 1.0 mg/dL    Alkaline Phosphatase 402 (H) 40 - 129 U/L    ALT 17 10 - 40 U/L    AST 27 15 - 37 U/L   Troponin   Result Value Ref Range    Troponin 0.52 (HH) <0.01 ng/mL   Magnesium   Result Value Ref Range    Magnesium 1.90 1.80 - 2.40 mg/dL   Lipase   Result Value Ref Range    Lipase 12.0 (L) 13.0 - 60.0 U/L   Blood Occult Stool Screen #1   Result Value Ref Range    Occult Blood Screening Result: Negative  Normal range: Negative      EKG 12 Lead   Result Value Ref Range    Ventricular Rate 77 BPM    Atrial Rate 77 BPM    P-R Interval 250 ms    QRS Duration 150 ms    Q-T Interval 440 ms    QTc Calculation (Bazett) 497 ms    P Axis 84 degrees    R Axis 72 degrees    T Axis -46 degrees    Diagnosis       Sinus rhythm with 1st degree A-V block with occasional Premature ventricular complexes and Premature atrial complexesRight bundle branch blockT wave abnormality, consider inferolateral ischemiaAbnormal ECGWhen compared with ECG of 08-JUN-2018 18:50,Premature ventricular complexes are now PresentPremature atrial complexes are now PresentRight bundle branch block is now Present         RADIOLOGY:  All x-ray studies areviewed/reviewed by me. Formal interpretations per the radiologist are as follows:      CT HEAD WO CONTRAST    Result Date: 5/17/2022  EXAMINATION: CT OF THE HEAD WITHOUT CONTRAST  5/17/2022 4:46 pm TECHNIQUE: CT of the head was performed without the administration of intravenous contrast. Automated exposure control, iterative reconstruction, and/or weight based adjustment of the mA/kV was utilized to reduce the radiation dose to as low as reasonably achievable. COMPARISON: None. HISTORY: ORDERING SYSTEM PROVIDED HISTORY: generalized fatigue, frequent falls TECHNOLOGIST PROVIDED HISTORY: If patient is on cardiac monitor and/or pulse ox, they may be taken off cardiac monitor and pulse ox, left on O2 if currently on. All monitors reattached when patient returns to room. Has a \"code stroke\" or \"stroke alert\" been called? ->No Reason for exam:->generalized fatigue, frequent falls Decision Support Exception - unselect if not a suspected or confirmed emergency medical condition->Emergency Medical Condition (MA) Reason for Exam: Generalized fatigue, Frequent falls FINDINGS: BRAIN/VENTRICLES: There is no acute intracranial hemorrhage, mass effect or midline shift. No abnormal extra-axial fluid collection. The gray-white differentiation is maintained without evidence of an acute infarct. There is no evidence of hydrocephalus. Generalized prominence of the ventricular and cisternal spaces consistent with cerebral atrophy.   Decreased attenuation in the periventricular and subcortical white matter most consistent with small vessel ischemic change. Intracranial atherosclerotic vascular calcification. ORBITS: The visualized portion of the orbits demonstrate no acute abnormality. SINUSES: The visualized paranasal sinuses and mastoid air cells demonstrate no acute abnormality. SOFT TISSUES/SKULL:  No acute abnormality of the visualized skull or soft tissues. No acute intracranial abnormality. Generalized cerebral atrophy and chronic small vessel white matter ischemic changes. RECOMMENDATIONS: Unavailable     XR CHEST PORTABLE    Result Date: 5/17/2022  EXAMINATION: ONE XRAY VIEW OF THE CHEST 5/17/2022 4:02 pm COMPARISON: Chest x-ray dated 04/06/2022 HISTORY: ORDERING SYSTEM PROVIDED HISTORY: weak TECHNOLOGIST PROVIDED HISTORY: Reason for exam:->weak Reason for Exam: fatigue, weak FINDINGS: Right-sided catheter tip in the SVC. Cardiomegaly. No focal lung consolidation. No pleural effusion or pneumothorax. Visualized osseous structures are unremarkable. No focal lung consolidation. EKG: The Ekg interpreted by me in the absence of a cardiologist shows. Sinus rhythm with a rate of 77    See also interpretation by Kirk Goldberg MD.      PROCEDURES:   N/A    CRITICAL CARE TIME:       Due to the immediate potential for life-threatening deterioration due to NSTEMI, I spent 31 minutes providing critical care. This time is excluding time spent performing procedures.       CONSULTS:  IP CONSULT TO CARDIOLOGY  IP CONSULT TO HOSPITALIST      EMERGENCY DEPARTMENT COURSE and DIFFERENTIAL DIAGNOSIS/MDM:   Vitals:    Vitals:    05/17/22 1621 05/17/22 1719 05/17/22 1748 05/17/22 1848   BP: (!) 131/57 (!) 122/56 (!) 126/59 115/62   Pulse: 76 77 78 74   Resp: 17 24 24 23   Temp:       TempSrc:       SpO2: 93% 97% 92% 96%   Weight:       Height:           Patient was given the following medications:  Medications   heparin (porcine) injection 4,000 Units (0 Units IntraVENous Held 5/17/22 1925)   heparin (porcine) injection 4,000 Units (has no administration in time range)   heparin (porcine) injection 2,000 Units (has no administration in time range)   heparin 25,000 units in dextrose 5% 250 mL (premix) infusion (0 Units/hr IntraVENous Held 5/17/22 1925)   0.9 % sodium chloride bolus (0 mLs IntraVENous Stopped 5/17/22 1853)   potassium chloride (KLOR-CON M) extended release tablet 40 mEq (40 mEq Oral Given 5/17/22 1739)         Patient was evaluated by both myself and Benito Kehr, MD.   Old records were reviewed. Patient here in the ED with generalized weakness. He had a minor, controlled fall today. He is not able to manage at home at this time. He appears pale and ill. Work-up initiated. EKG normal sinus rhythm. Right bundle branch block. Rate 77 bpm  Portable chest x-ray no focal lung consolidation  CBC white count 15.8, H&H 7.5 and 23.1  CMP hyponatremia at 127, K 3.1, chloride 91, BUN is 11 and creatinine 3.7 with GFR 16.  Mag normal at 1.9  Troponin 0.52  Lipase 12  Rapid COVID-negative  Stool is guaiac negative  CT head shows no acute intracranial abnormality. Generalized cerebral atrophy and chronic small vessel white matter ischemia changes  Patient was ordered a bolus of normal saline by my attending. I ordered KCl 40 mill equivalents orally for hypokalemia. My attending spoke with the cardiology regarding the patient's elevated troponin despite no chest pain. Cardiology recommended starting heparin. However, given the patient's significant anemia, despite his lack negative stool, we are going to see what his second troponin is before initiating heparin. Patient has been stable here in the ED. I spoke with Dr. Kathy Pink. We thoroughly discussed the history, physical exam, laboratory and imaging studies, as well as, emergency department course.  Based upon that discussion, we've decided to admit Clemencia Garzon for further observation and evaluation of Sandra Mcneal's NSTEMI. As I have deemed necessary from their history, physical, and studies, I have considered and evaluated Chadwick Sherman for the following diagnoses:  ACUTE CORONARY SYNDROME, PERICARDIAL TAMPONADE, CHF, SEPSIS, COPD EXACERBATION, PNEUMONIA, PNEUMOTHORAX, PULMONARY EMBOLISM, and THORACIC DISSECTION. FINAL IMPRESSION:      1. NSTEMI (non-ST elevated myocardial infarction) ([address]    2. Generalized weakness    3. Anemia, unspecified type    4.  Hyponatremia          DISPOSITION/PLAN:   DISPOSITION     ADMIT             (Please note thatportions of this note were completed with a voice recognition program.  Efforts were made to edit the dictations, but occasionally words are mis-transcribed.)    Torsten Santiago PA-C (electronicallysigned)              José Manuel Valle  05/17/22 1946

## 2022-05-17 NOTE — ED NOTES
[address]   RE:elevated troponin  [id number]  called back and talked to [address] [place] [address]

## 2022-05-17 NOTE — CONSULTS
Pharmacy to Manage Heparin Infusion per [facility]    Dx: elevated troponin  Pt wt = 84.8kg___ (will use adjusted wt if actual body weight > 120% ideal body weight). Baseline anti-Xa and/or aPTT =    Oral factor Xa-inhibitors may alter and elevate anti-Xa levels used for unfractionated heparin monitoring. As a result, anti-Xa monitoring is not accurate while Xa-inhibitor activity is detectable. Utilize aPTT monitoring when patient received an oral factor Xa-inhibitor (apixaban, betrixaban, edoxaban or rivaroxaban) within 72 hours prior to admission (please document last administration time). The goal is to allow a washout of oral factor Xa-inhibitors by using aPTT for 72 hours, then change to ant-Xa levels for UFH. Low Dose Heparin Infusion  Heparin 60 units/kg IVP bolus followed by Heparin infusion at 12 units/kg/hr (recommended initial max dose 1000 units./hr). Recheck aPTT in 6 hours. Goal anti-Xa 0.3-0.7 IU/mL  Goal aPTT = 60-90 seconds. Adryan Rodriguez[address]  5/17/2022 at 6:19 PM    Anti-Xa, Unfractionated Heparin [[id number]] (Abnormal)    Collected: 05/18/22 0231    Updated: 05/18/22 0348    Specimen Source: Blood     Anti-XA Unfrac Heparin 0.05 Low  IU/mL   4000 bolus; rate = 12 ml/hr  Xa 1100  Mecca Jacinto, Pharm D.5/18/2022 4:50 AM    5/18/2022  anti-Xa level = 0.10 IU/mL at 1316  Give 2000 units bolus  Increase Heparin infusion rate to 1350 units/hr  Recheck level in 6 hours  Gabe ZuritaD  5/18/2022 1:57 PM    5/19/2022 at 0426  Anti-XA Unfrac Heparin 0.21 Low  IU/mL   - Heparin _2000_ units IVP bolus and then increase Heparin infusion to _1650_ units/hr. - Recheck Anti-Xa in 6 hours at 1100.    Gabe CorralD    5/19/2022 5:03 AM

## 2022-05-18 LAB
ANION GAP SERPL CALCULATED.3IONS-SCNC: 10 MMOL/L (ref 3–16)
ANTI-XA UNFRAC HEPARIN: 0.05 IU/ML (ref 0.3–0.7)
ANTI-XA UNFRAC HEPARIN: 0.1 IU/ML (ref 0.3–0.7)
ANTI-XA UNFRAC HEPARIN: 0.1 IU/ML (ref 0.3–0.7)
BASOPHILS ABSOLUTE: 0.1 K/UL (ref 0–0.2)
BASOPHILS RELATIVE PERCENT: 0.8 %
BLOOD BANK DISPENSE STATUS: NORMAL
BLOOD BANK PRODUCT CODE: NORMAL
BPU ID: NORMAL
BUN BLDV-MCNC: 15 MG/DL (ref 7–20)
CALCIUM SERPL-MCNC: 7.9 MG/DL (ref 8.3–10.6)
CHLORIDE BLD-SCNC: 95 MMOL/L (ref 99–110)
CO2: 26 MMOL/L (ref 21–32)
CREAT SERPL-MCNC: 4.7 MG/DL (ref 0.8–1.3)
DESCRIPTION BLOOD BANK: NORMAL
EKG ATRIAL RATE: 75 BPM
EKG ATRIAL RATE: 77 BPM
EKG DIAGNOSIS: NORMAL
EKG DIAGNOSIS: NORMAL
EKG P AXIS: 48 DEGREES
EKG P AXIS: 84 DEGREES
EKG P-R INTERVAL: 248 MS
EKG P-R INTERVAL: 250 MS
EKG Q-T INTERVAL: 432 MS
EKG Q-T INTERVAL: 440 MS
EKG QRS DURATION: 144 MS
EKG QRS DURATION: 150 MS
EKG QTC CALCULATION (BAZETT): 482 MS
EKG QTC CALCULATION (BAZETT): 497 MS
EKG R AXIS: 72 DEGREES
EKG R AXIS: 77 DEGREES
EKG T AXIS: -26 DEGREES
EKG T AXIS: -46 DEGREES
EKG VENTRICULAR RATE: 75 BPM
EKG VENTRICULAR RATE: 77 BPM
EOSINOPHILS ABSOLUTE: 0.8 K/UL (ref 0–0.6)
EOSINOPHILS RELATIVE PERCENT: 5.7 %
GFR AFRICAN AMERICAN: 15
GFR NON-AFRICAN AMERICAN: 12
GLUCOSE BLD-MCNC: 99 MG/DL (ref 70–99)
HCT VFR BLD CALC: 22 % (ref 40.5–52.5)
HEMOGLOBIN: 7.1 G/DL (ref 13.5–17.5)
LYMPHOCYTES ABSOLUTE: 1.2 K/UL (ref 1–5.1)
LYMPHOCYTES RELATIVE PERCENT: 8.9 %
MCH RBC QN AUTO: 27 PG (ref 26–34)
MCHC RBC AUTO-ENTMCNC: 32.1 G/DL (ref 31–36)
MCV RBC AUTO: 84 FL (ref 80–100)
MONOCYTES ABSOLUTE: 1 K/UL (ref 0–1.3)
MONOCYTES RELATIVE PERCENT: 7.1 %
NEUTROPHILS ABSOLUTE: 10.8 K/UL (ref 1.7–7.7)
NEUTROPHILS RELATIVE PERCENT: 77.5 %
PDW BLD-RTO: 15.2 % (ref 12.4–15.4)
PLATELET # BLD: 363 K/UL (ref 135–450)
PMV BLD AUTO: 6.6 FL (ref 5–10.5)
POTASSIUM REFLEX MAGNESIUM: 4 MMOL/L (ref 3.5–5.1)
RBC # BLD: 2.62 M/UL (ref 4.2–5.9)
SODIUM BLD-SCNC: 131 MMOL/L (ref 136–145)
T4 FREE: 0.9 NG/DL (ref 0.9–1.8)
TROPONIN: 0.54 NG/ML
TSH REFLEX: 88.62 UIU/ML (ref 0.27–4.2)
WBC # BLD: 13.9 K/UL (ref 4–11)

## 2022-05-18 PROCEDURE — 36415 COLL VENOUS BLD VENIPUNCTURE: CPT

## 2022-05-18 PROCEDURE — 93010 ELECTROCARDIOGRAM REPORT: CPT | Performed by: INTERNAL MEDICINE

## 2022-05-18 PROCEDURE — 6360000002 HC RX W HCPCS: Performed by: INTERNAL MEDICINE

## 2022-05-18 PROCEDURE — 6370000000 HC RX 637 (ALT 250 FOR IP): Performed by: INTERNAL MEDICINE

## 2022-05-18 PROCEDURE — 85520 HEPARIN ASSAY: CPT

## 2022-05-18 PROCEDURE — 84484 ASSAY OF TROPONIN QUANT: CPT

## 2022-05-18 PROCEDURE — 85025 COMPLETE CBC W/AUTO DIFF WBC: CPT

## 2022-05-18 PROCEDURE — 36430 TRANSFUSION BLD/BLD COMPNT: CPT

## 2022-05-18 PROCEDURE — 80048 BASIC METABOLIC PNL TOTAL CA: CPT

## 2022-05-18 PROCEDURE — 2580000003 HC RX 258: Performed by: INTERNAL MEDICINE

## 2022-05-18 PROCEDURE — 2060000000 HC ICU INTERMEDIATE R&B

## 2022-05-18 PROCEDURE — 99223 1ST HOSP IP/OBS HIGH 75: CPT | Performed by: INTERNAL MEDICINE

## 2022-05-18 RX ORDER — HEPARIN SODIUM 1000 [USP'U]/ML
2000 INJECTION, SOLUTION INTRAVENOUS; SUBCUTANEOUS ONCE
Status: COMPLETED | OUTPATIENT
Start: 2022-05-18 | End: 2022-05-18

## 2022-05-18 RX ORDER — MEGESTROL ACETATE 40 MG/ML
625 SUSPENSION ORAL DAILY
Status: DISCONTINUED | OUTPATIENT
Start: 2022-05-18 | End: 2022-05-26 | Stop reason: HOSPADM

## 2022-05-18 RX ORDER — HEPARIN SODIUM 1000 [USP'U]/ML
4000 INJECTION, SOLUTION INTRAVENOUS; SUBCUTANEOUS ONCE
Status: COMPLETED | OUTPATIENT
Start: 2022-05-18 | End: 2022-05-18

## 2022-05-18 RX ADMIN — SODIUM CHLORIDE, PRESERVATIVE FREE 10 ML: 5 INJECTION INTRAVENOUS at 08:37

## 2022-05-18 RX ADMIN — HEPARIN SODIUM 1350 UNITS/HR: 10000 INJECTION, SOLUTION INTRAVENOUS at 16:30

## 2022-05-18 RX ADMIN — DOXAZOSIN 1 MG: 2 TABLET ORAL at 08:37

## 2022-05-18 RX ADMIN — HEPARIN SODIUM 4000 UNITS: 1000 INJECTION INTRAVENOUS; SUBCUTANEOUS at 05:13

## 2022-05-18 RX ADMIN — CARVEDILOL 3.12 MG: 3.12 TABLET, FILM COATED ORAL at 18:13

## 2022-05-18 RX ADMIN — MEGESTROL ACETATE 625 MG: 40 SUSPENSION ORAL at 18:22

## 2022-05-18 RX ADMIN — HEPARIN SODIUM 2000 UNITS: 1000 INJECTION INTRAVENOUS; SUBCUTANEOUS at 14:33

## 2022-05-18 RX ADMIN — ATORVASTATIN CALCIUM 20 MG: 10 TABLET, FILM COATED ORAL at 08:37

## 2022-05-18 RX ADMIN — CARVEDILOL 3.12 MG: 3.12 TABLET, FILM COATED ORAL at 08:37

## 2022-05-18 RX ADMIN — LEVOTHYROXINE SODIUM 137 MCG: 0.03 TABLET ORAL at 05:11

## 2022-05-18 RX ADMIN — ASPIRIN 81 MG 81 MG: 81 TABLET ORAL at 08:37

## 2022-05-18 RX ADMIN — SODIUM CHLORIDE, PRESERVATIVE FREE 10 ML: 5 INJECTION INTRAVENOUS at 20:28

## 2022-05-18 RX ADMIN — CALCIUM 500 MG: 500 TABLET ORAL at 08:37

## 2022-05-18 RX ADMIN — HEPARIN SODIUM 2000 UNITS: 1000 INJECTION INTRAVENOUS; SUBCUTANEOUS at 21:46

## 2022-05-18 NOTE — H&P
Hospital Medicine History & Physical      PCP: Kathi Rousseau MD    Date of Admission: 5/17/2022    Date of Service: Pt seen/examined on 5/17/22 and Admitted to Inpatient with expected LOS greater than two midnights due to medical therapy. Chief Complaint - weakness      History Of Present Illness:    66 y.o. male who presented to [facility] with above complaints  Patient with PMH of diet-controlled DM2, HTN, HLD, CKD 5 on dialysis started 3 weeks ago presented to the ED with complaints of generalized weakness. Patient reports he sees Dr. Hong Obando with nephrology. He was started on dialysis about 3 weeks ago. Since then he has had increasing generalized weakness, decreased appetite. He finds it difficult to ambulate. Today morning he reports he was ambulating, went to dialysis finished his dialysis and after he came home he was very weak. He required assistance to get back into the car to come home. At home he reports he was extremely weak and slowly went down to the ground by his spouse and she could not get him back up on his feet again so she ended up calling EMS. He denies any shortness of breath or chest pain. No syncopal episodes reported. He reports he has not been eating much. Still makes urine. Denied any nausea vomiting or diarrhea, no abdominal pain. Denies any fevers or chills. He has a TDC in his right chest.  Patient reports he was about 200 pounds when he started dialysis, currently at 190 pounds. 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     Diabetes mellitus ([address] 12/20/2019    Hyperkalemia 12/20/2019    Hypertension 12/20/2019    Renal osteodystrophy 12/23/2019       Past Surgical History:      No past surgical history on file. Medications Prior to Admission:   Current Outpatient Medications   Medication Sig Dispense Refill    amLODIPine (NORVASC) 2.5 mg Oral Tablet Take 5 mg by mouth.  aspirin 81 mg Oral Tablet, Chewable Take by mouth.  atorvastatin (LIPITOR) 20 mg Oral Tablet Take 1 Tab by mouth.  calcium carbonate-vitamin D (OS-KODI 500 + D3) 500 mg(1,250mg) -200 unit Oral Tablet Take 1 Tab by mouth.  carvedilol (COREG) 3.125 mg Oral Tablet 2 times daily.  doxazosin (CARDURA) 2 mg Oral Tablet Take 1 Tab by mouth.  LEVOthyroxine (SYNTHROID) 137 mcg Oral Tablet Take 1 Tab by mouth.  nitroGLYCERIN (NITROSTAT) 0.4 mg SL Tablet, Sublingual Place under the tongue.  sodium zirconium cyclosilicate (LOKELMA) 10 gram Oral Powder in Packet Take 10 g by mouth two times a week. 11 Packet 10      Allergies:  Patient has no known allergies. Social History:      The patient currently lives at home    TOBACCO:   reports that he quit smoking about 4 years ago. His smoking use included cigarettes. He smoked 0.50 packs per day. He has never used smokeless tobacco.  ETOH:   reports no history of alcohol use. E-Cigarettes/Vaping Use     Questions Responses    E-Cigarette/Vaping Use     Start Date     Passive Exposure     Quit Date     Counseling Given     Comments             Family History:  Reviewed in detail and negative for DM, CAD, Cancer, CVA. REVIEW OF SYSTEMS COMPLETED:   Pertinent positives as noted in the HPI. All other systems reviewed and negative. PHYSICAL EXAM PERFORMED:    BP (!) 116/53   Pulse 73   Temp 98 °F (36.7 °C) (Oral)   Resp 17   Ht 5' 8\" (1.727 m)   Wt 187 lb (84.8 kg)   SpO2 94%   BMI 28.43 kg/m²     General appearance:  No apparent distress, appears stated age and cooperative.   HEENT:  Normal cephalic, atraumatic without obvious deformity. Pupils equal, round, and reactive to light. Extra ocular muscles intact. Conjunctivae/corneas clear. Neck: Supple, with full range of motion. No jugular venous distention. Trachea midline. Respiratory:  Normal respiratory effort. Clear to auscultation, bilaterally without Rales/Wheezes/Rhonchi. Temporary dialysis catheter in the right subclavian  Cardiovascular:  Regular rate and rhythm with normal S1/S2 without murmurs, rubs or gallops. Abdomen: Soft, non-tender, non-distended with normal bowel sounds. Musculoskeletal:  No clubbing, cyanosis or edema bilaterally. Full range of motion without deformity. Skin: Skin color, texture, turgor normal.  No rashes or lesions. Neurologic:  Neurovascularly intact without any focal sensory/motor deficits. Cranial nerves: II-XII intact, grossly non-focal.  Psychiatric:  Alert and oriented, thought content appropriate, normal insight  Capillary Refill: Brisk,3 seconds, normal  Peripheral Pulses: +2 palpable, equal bilaterally       Labs:     Recent Labs     05/17/22  1828   WBC 15.8*   HGB 7.5*   HCT 23.1*        Recent Labs     05/17/22  1553   *   K 3.1*   CL 91*   CO2 28   BUN 11   CREATININE 3.7*   CALCIUM 7.9*     Recent Labs     05/17/22  1553   AST 27   ALT 17   BILITOT 0.5   ALKPHOS 402*     No results for input(s): INR in the last 72 hours. Recent Labs     05/17/22  1553 05/17/22  1847   TROPONINI 0.52* 0.55*       Radiology:     CXR: I have reviewed the CXR with the following interpretation: No acute process  EKG:  I have reviewed the EKG with the following interpretation: NSR, rate 77, T wave inversion in inferior and lateral leads which appears to be new compared to prior EKG from 6/8/2018, no clear ST elevation or depression    CT HEAD WO CONTRAST   Final Result   No acute intracranial abnormality. Generalized cerebral atrophy and chronic small vessel white matter ischemic   changes.       RECOMMENDATIONS:   Unavailable XR CHEST PORTABLE   Final Result   No focal lung consolidation. ASSESSMENT:PLAN:    Generalized weakness  -Will need consult PT/OT, but likely multifactorial related to recent initiation of dialysis, hypokalemia, symptomatic anemia.  -Fall precautions  -Continue medical treatment and assess needs for PT/OT consult daily    Troponin level elevated  EKG showing new ischemic changes. No real anginal symptoms. Troponin 0.52-->0.55  -Cardiology consulted by ED, recommended initiation of heparin drip  -Aspirin loading and maintenance daily  -Continue statin  -Continue to trend cardiac enzyme levels  -Check 2D echo for any wall motion abnormalities, structural heart disease  -Keep n.p.o. after midnight for possible invasive work-up in a.m. Hyponatremia  -Likely due to increased free water, vs hypovolemia. Clinically no evidence of hypervolemia. Patient may be volume depleted. We will get opinion from nephrology    Hypokalemia  -Replacement ordered in the ED, recheck level in a.m. Anemia of CKD  Hb 7.5, was 9.4 in April 2022.  -Gets VIRAJ with dialysis managed by nephrology  -Transfuse if Hb <7  -FOBT negative    CKD stage V on hemodialysis  -Consult nephrology    HTN-controlled, resume amlodipine and Coreg, counseled compliance with medications    Hypothyroidism-resume home dose levothyroxine    Tobacco use disorder-counseled cessation    DM type II -controlled, last A1c 5.8. Off insulin therapy. Managed with diet. Will use sliding scale insulin low-dose while inpatient and monitor Accu-Cheks        DVT Prophylaxis: Heparin drip  Diet: No diet orders on file  Code Status: Full code  PT/OT Eval Status: Not consulted yet, pending clinical course    Dispo -IP stay       Michael Olivera MD    Thank you Lilli Segovia MD for the opportunity to be involved in this patient's care. If you have any questions or concerns please feel free to contact me at [phone].

## 2022-05-18 NOTE — CONSULTS
[facility]  [phone]      Attending Physician: Keya Ackerman MD  Reason for Consultation/Chief Complaint: Elevated    Subjective   History of Present Illness:  Amanda Oneill is a 66 y.o. patient who presented to the hospital with complaints of weakness. Newly started on Dialysis 3 wks ago. States increasing downhill that led him to not be able to stand. On HD for 3 wks. Yesterday had HD and no issues reported, went to care an d ok, but getting home could not get up 2 stapes. Just very week, no \"support beneath him\". Prior HD exhaust him but this was worse. No CP, no heaviness. No excertional Cp prior. Past Medical History:   has a past medical history of Anemia, Chronic kidney disease, Diabetes mellitus ([address] Hyperlipidemia, and Hypertension. Surgical History:   has no past surgical history on file. Social History:   reports that he quit smoking about 4 years ago. His smoking use included cigarettes. He smoked 0.50 packs per day. He has never used smokeless tobacco. He reports that he does not drink alcohol and does not use drugs. Family History:  family history is not on file. Home Medications:  Were reviewed and are listed in nursing record and/or below  Prior to Admission medications    Medication Sig Start Date End Date Taking?  Authorizing Provider   sevelamer (RENVELA) 800 MG tablet Take 1 tablet by mouth 3 times daily (with meals)   Yes Historical Provider, MD   aspirin 81 MG chewable tablet Take 1 tablet by mouth daily 5/11/18   Ronnie Mensah MD   carvedilol (COREG) 3.125 MG tablet Take 1 tablet by mouth 2 times daily (with meals) 5/10/18   Ronnie Mensah MD   atorvastatin (LIPITOR) 20 MG tablet Take 1 tablet by mouth daily 5/11/18   Ronnie Mensah MD   furosemide (LASIX) 40 MG tablet Take 1 tablet by mouth daily  Patient not taking: Reported on 5/17/2022 5/11/18   Ronnie Mensah MD   nitroGLYCERIN (NITROSTAT) 0.4 MG SL tablet up to max of 3 total doses. If no relief after 1 dose, call 911. 5/10/18   Casper Samson MD   Darbepoetin Imtiaz-Polysorbate (ARANESP, ALB FREE, SURECLICK IJ) Inject as directed Pt's wife unsure of dosage. Pt had infusion on 4/27.     Historical Provider, MD   doxazosin (CARDURA) 2 MG tablet Take 1 tablet by mouth every 12 hours  Patient taking differently: Take 1 mg by mouth every 12 hours  4/10/18   Lizzette Martínez MD   calcium elemental (OSCAL) 500 MG TABS tablet Take 1 tablet by mouth daily 4/11/18   Lizzette Martínez MD   pantoprazole (PROTONIX) 40 MG tablet Take 1 tablet by mouth every morning (before breakfast)  Patient not taking: Reported on 5/17/2022 4/11/18   Lizzette Martínez MD   insulin glargine (LANTUS SOLOSTAR) 100 UNIT/ML injection pen Inject 25 Units into the skin nightly  Patient not taking: Reported on 5/17/2022 4/10/18   Lizzette Martínez MD   Insulin Pen Needle 32G X 5 MM MISC 1 each by Does not apply route 5 times daily 4/10/18   Lizzette Martínez MD   insulin lispro (HUMALOG KWIKPEN) 100 UNIT/ML pen AC:  No Insulin, 140-199 3 Units, 200-249 6 Units, 250-299 9 Units, 300-349 12 Units, 350-400 15 Units, 400+ 18 Units  HS:  No Insulin, 140-199 2 Units, 200-249 3 Units, 250-299 5 Units, 300-349 6 Units, 350-400 7 Units, 400+ 9 Units  Anticipated 24 hr use: up to 66 units  Patient not taking: Reported on 5/17/2022 4/10/18   Lizzette Martínez MD   levothyroxine (SYNTHROID) 125 MCG tablet Take 137 mcg by mouth Daily     Historical Provider, MD        CURRENT Medications:  heparin (porcine) injection 4,000 Units, PRN  heparin (porcine) injection 2,000 Units, PRN  heparin 25,000 units in dextrose 5% 250 mL (premix) infusion, Continuous  aspirin chewable tablet 81 mg, Daily  atorvastatin (LIPITOR) tablet 20 mg, Daily  calcium elemental (OSCAL) tablet 500 mg, Daily  carvedilol (COREG) tablet 3.125 mg, BID WC  doxazosin (CARDURA) tablet 1 mg, Daily  levothyroxine (SYNTHROID) tablet 137 mcg, Daily  amLODIPine (NORVASC) tablet 10 mg, Daily  sodium chloride flush 0.9 % injection 5-40 mL, 2 times per day  sodium chloride flush 0.9 % injection 5-40 mL, PRN  0.9 % sodium chloride infusion, PRN  ondansetron (ZOFRAN-ODT) disintegrating tablet 4 mg, Q8H PRN   Or  ondansetron (ZOFRAN) injection 4 mg, Q6H PRN  polyethylene glycol (GLYCOLAX) packet 17 g, Daily PRN  acetaminophen (TYLENOL) tablet 650 mg, Q6H PRN   Or  acetaminophen (TYLENOL) suppository 650 mg, Q6H PRN  nitroGLYCERIN (NITROSTAT) SL tablet 0.4 mg, Q5 Min PRN  0.9 % sodium chloride infusion, PRN        Allergies:  Patient has no known allergies. Review of Systems:   A 14 point review of symptoms completed. Pertinent positives identified in the HPI, all other review of symptoms negative as below.       Objective   PHYSICAL EXAM:    Vitals:    05/18/22 0808   BP:    Pulse: 73   Resp:    Temp:    SpO2:     Weight: 190 lb (86.2 kg)         General Appearance:  Alert, cooperative, no distress, appears stated age   Head:  Normocephalic, without obvious abnormality, atraumatic   Eyes:  PERRL, conjunctiva/corneas clear   Nose: Nares normal, no drainage or sinus tenderness   Throat: Lips, mucosa, and tongue normal   Neck: Supple, symmetrical, trachea midline, no adenopathy, thyroid: not enlarged, symmetric, no tenderness/mass/nodules, no carotid bruit or JVD   Lungs:   Clear to auscultation bilaterally, respirations unlabored   Chest Wall:  No deformity or tenderness   Heart:  Regular rate and rhythm, S1, S2 normal, 3/6 systolic in RUSB, radiated to left carotidmurmur, rub or gallop   Abdomen:   Soft, non-tender, bowel sounds active all four quadrants,  no masses, no organomegaly   Extremities: Extremities normal, atraumatic, no cyanosis or edema   Pulses: 2+ and symmetric   Skin: Skin color, texture, turgor normal, no rashes or lesions   Pysch: Normal mood and affect   Neurologic: Normal gross motor and sensory exam.         Labs   CBC:   Lab Results Component Value Date    WBC 13.9 2022    RBC 2.62 2022    HGB 7.1 2022    HCT 22.0 2022    MCV 84.0 2022    RDW 15.2 2022     2022     CMP:  Lab Results   Component Value Date     2022    K 4.0 2022    CL 95 2022    CO2 26 2022    BUN 15 2022    CREATININE 4.7 2022    GFRAA 15 2022    AGRATIO 0.5 2022    LABGLOM 12 2022    GLUCOSE 99 2022    PROT 6.0 2022    CALCIUM 7.9 2022    BILITOT 0.5 2022    ALKPHOS 402 2022    AST 27 2022    ALT 17 2022     PT/INR:  No results found for: PTINR  HgBA1c:  Lab Results   Component Value Date    LABA1C 6.0 2018     Lab Results   Component Value Date    CKTOTAL 23 (L) 2018    TROPONINI 0.54 (HH) 2022         Cardiac Data     Last EK2022 1612 NSR with 1st AVB, PVC, RBBB, lateral St changes, no gross ischemia    Echo: 2018   Limited exam for left ventricular systolic function. Left ventricular systolic function is normal with ejection fraction   estimated at 56% by [place]'s method. No regional wall motion abnormalities. No change from exam done 2018. Stress Test:    Cath:    Studies:   HCT:  No acute intracranial abnormality.       Generalized cerebral atrophy and chronic small vessel white matter ischemic   changes.       RECOMMENDATIONS:   Unavailable     CXR:  Right-sided catheter tip in the SVC.  Cardiomegaly.  No focal lung   consolidation.  No pleural effusion or pneumothorax.  Visualized osseous   structures are unremarkable. Assessment and Plan      1. Elevated troponin: flat  2. Abnormal ECG  3. PVCs  4. Hx of Pericarditis 2018   - pt did not f/u with Cardiology following dx  5. Murmur  6. Hyponatremia  7. Normocytic Anemia: from CKD  8.  ESRD: on dialysis   - Necrotizing and Crescentic Glomerulonephritis, in setting of Coxsackie pericarditis with +C-ANCA      PLAN  1. Troponins flat but fairly elevated at 0.54 in setting of severe CKD on HD and anemia   - agree with blood transfusion   - Anemia per IM/Nephro  2. Update echo for LVEF, filling pressures, and aortic murmur. 3. Future direction will depend on echo and degree of valvular disease   - cath vs stress test. D/w pt and daughter  4. [address] with heparin ggt for now until echo returns. 5. Cont ASA, lipitor, coreg   - ACE/ARB on hold with CKD   - check lipids, titrate statin  Further recs following echo    Patient Active Problem List   Diagnosis    DM (diabetes mellitus), type 2 ([address]    Essential hypertension    Hypothyroidism    Mixed hyperlipidemia    Tobacco use disorder    Hyponatremia    Acute infective pericarditis    Pulmonary nodule    Sepsis [facility]    ARF (acute renal failure) ([facility]    Crescentic glomerulonephritis    Anemia of chronic renal failure    Non-STEMI (non-ST elevated myocardial infarction) (HCC)    Chronic kidney disease, stage 5 (HCC)    Iron deficiency anemia    Weakness    Troponin level elevated           Thank you for allowing us to participate in the care of Christa Alfaro. Please call me with any questions [phone]. Shiloh Duncan MD, [address] [facility] Cardiologist  [address]  [phone] [facility]  [phone] [facility]  5/18/2022 8:38 AM    I will address the patient's cardiac risk factors and adjusted pharmacologic treatment as needed. In addition, I have reinforced the need for patient directed risk factor modification. All questions and concerns were addressed to the patient/family. Alternatives to my treatment were discussed. The note was completed using EMR. Every effort was made to ensure accuracy; however, inadvertent computerized transcription errors may be present.

## 2022-05-18 NOTE — PROGRESS NOTES
25.0-29. 9)    Estimated Daily Nutrient Needs:  Energy Requirements Based On: Kcal/kg (25-28)  Weight Used for Energy Requirements: Current (86kg)  Energy (kcal/day): 5473-6433  Weight Used for Protein Requirements: Current (1-1.2)  Protein (g/day):   Method Used for Fluid Requirements: 1 ml/kcal  Fluid (ml/day): 4016-0136    Nutrition Diagnosis:   · Inadequate oral intake related to early satiety as evidenced by poor intake prior to admission,weight loss    Nutrition Interventions:   Food and/or Nutrient Delivery: Start Oral Diet,Start Oral Nutrition Supplement  Nutrition Education/Counseling: Education not appropriate  Coordination of Nutrition Care: Continue to monitor while inpatient  Plan of Care discussed with: Patient, wife    Goals:     Goals: PO intake 50% or greater,prior to discharge       Nutrition Monitoring and Evaluation:   Behavioral-Environmental Outcomes: None Identified  Food/Nutrient Intake Outcomes: Food and Nutrient Intake,Supplement Intake  Physical Signs/Symptoms Outcomes: Weight,Nutrition Focused Physical Findings,Biochemical Data    Discharge Planning:     Too soon to determine     Maykel Barton, [address]  Contact: [phone]

## 2022-05-18 NOTE — CONSULTS
[facility] Consult Note           Reason for Consult:  End stage kidney disease  Requesting Physician:  Dr. Leisa Hayes    Chief Complaint: Weakness  History Obtained From:  patient, electronic medical record    History of Present Ilness:    66year old male recently started on HD last month admitted with progressive weakness. We have been asked to assist in further dialysis care. Received HD yesterday though became progressively more weak, brought in for evaluation. Has had generalized weakness, decreased appetite, weight loss ~10 pounds since starting dialysis last month. Denies any shortness of breath, chest pain, syncope, abdominal pain, or fevers. Past Medical History:   Diagnosis Date    Anemia     Diabetes mellitus ([facility]     ESRD (end stage renal disease) on dialysis ([facility] [facility]     Tues-Thurs-Sat    Hyperlipidemia     Hypertension      Past Surgical History:    No past surgical history on file. Home Medications:    No current facility-administered medications on file prior to encounter. Current Outpatient Medications on File Prior to Encounter   Medication Sig Dispense Refill    sevelamer (RENVELA) 800 MG tablet Take 1 tablet by mouth 3 times daily (with meals)      aspirin 81 MG chewable tablet Take 1 tablet by mouth daily 30 tablet 3    carvedilol (COREG) 3.125 MG tablet Take 1 tablet by mouth 2 times daily (with meals) 60 tablet 3    atorvastatin (LIPITOR) 20 MG tablet Take 1 tablet by mouth daily 30 tablet 3    furosemide (LASIX) 40 MG tablet Take 1 tablet by mouth daily (Patient not taking: Reported on 5/17/2022) 60 tablet 3    nitroGLYCERIN (NITROSTAT) 0.4 MG SL tablet up to max of 3 total doses. If no relief after 1 dose, call 911. 25 tablet 3    Darbepoetin Imtiaz-Polysorbate (ARANESP, ALB FREE, SURECLICK IJ) Inject as directed Pt's wife unsure of dosage. Pt had infusion on 4/27.       doxazosin (CARDURA) 2 MG tablet Take 1 tablet by mouth every 12 hours (Patient taking differently: Take 1 mg by mouth every 12 hours ) 60 tablet 3    calcium elemental (OSCAL) 500 MG TABS tablet Take 1 tablet by mouth daily 30 tablet 3    pantoprazole (PROTONIX) 40 MG tablet Take 1 tablet by mouth every morning (before breakfast) (Patient not taking: Reported on 2022) 30 tablet 3    insulin glargine (LANTUS SOLOSTAR) 100 UNIT/ML injection pen Inject 25 Units into the skin nightly (Patient not taking: Reported on 2022) 5 pen 3    Insulin Pen Needle 32G X 5 MM MISC 1 each by Does not apply route 5 times daily 150 each 3    insulin lispro (HUMALOG KWIKPEN) 100 UNIT/ML pen AC:  No Insulin, 140-199 3 Units, 200-249 6 Units, 250-299 9 Units, 300-349 12 Units, 350-400 15 Units, 400+ 18 Units  HS:  No Insulin, 140-199 2 Units, 200-249 3 Units, 250-299 5 Units, 300-349 6 Units, 350-400 7 Units, 400+ 9 Units  Anticipated 24 hr use: up to 66 units (Patient not taking: Reported on 2022) 5 pen 3    levothyroxine (SYNTHROID) 125 MCG tablet Take 137 mcg by mouth Daily          Allergies:  Patient has no known allergies.     Social History:    Social History     Socioeconomic History    Marital status:      Spouse name: Not on file    Number of children: Not on file    Years of education: Not on file    Highest education level: Not on file   Occupational History    Not on file   Tobacco Use    Smoking status: Former Smoker     Packs/day: 0.50     Types: Cigarettes     Quit date: 2018     Years since quittin.2    Smokeless tobacco: Never Used   Substance and Sexual Activity    Alcohol use: No     Alcohol/week: 35.0 standard drinks     Types: 35 Cans of beer per week     Comment: last drink  \"Pt states he hasn't drank in four weeks\"    Drug use: No    Sexual activity: Not on file   Other Topics Concern    Not on file   Social History Narrative    Not on file     Social Determinants of Health     Financial Resource Strain:     Difficulty of Paying Living Expenses: Not on file   Food Insecurity:     Worried About Running Out of Food in the Last Year: Not on file    Margarita of Food in the Last Year: Not on file   Transportation Needs:     Lack of Transportation (Medical): Not on file    Lack of Transportation (Non-Medical): Not on file   Physical Activity:     Days of Exercise per Week: Not on file    Minutes of Exercise per Session: Not on file   Stress:     Feeling of Stress : Not on file   Social Connections:     Frequency of Communication with Friends and Family: Not on file    Frequency of Social Gatherings with Friends and Family: Not on file    Attends Congregation Services: Not on file    Active Member of [address] [place] [address] [employer] or Organizations: Not on file    Attends Club or Organization Meetings: Not on file    Marital Status: Not on file   Intimate Partner Violence:     Fear of Current or Ex-Partner: Not on file    Emotionally Abused: Not on file    Physically Abused: Not on file    Sexually Abused: Not on file   Housing Stability:     Unable to Pay for Housing in the Last Year: Not on file    Number of [place] in the Last Year: Not on file    Unstable Housing in the Last Year: Not on file       Family History:   No history of kidney disease. Review of Systems:   Pertinent positives stated above in HPI. Remainder of 10 point review of systems were reviewed and were negative.     Physical exam:   Constitutional:  VITALS:  BP (!) 126/57   Pulse 65   Temp 97.6 °F (36.4 °C) (Oral)   Resp 16   Ht 5' 10\" (1.778 m)   Wt 190 lb (86.2 kg)   SpO2 95%   BMI 27.26 kg/m²   Gen: alert, awake, ill-appearing  Skin: no rash, turgor wnl  Heent:  eomi, mmm  Neck: no bruits or jvd noted, thyroid normal  Cardiovascular:  S1, S2 without m/r/g  Respiratory: CTA B without w/r/r; respiratory effort normal  Abdomen:  +bs, soft, nt, nd, no hepatosplenomegaly  Ext: no lower extremity edema  Access: R IJ TDC c/d/i  Psychiatric: mood and affect appropriate; judgement and insight intact  Musculoskeletal:  Rom, muscular strength limited; digits, nails normal    Data/  CBC:   Lab Results   Component Value Date    WBC 13.9 05/18/2022    RBC 2.62 05/18/2022    HGB 7.1 05/18/2022    HCT 22.0 05/18/2022    MCV 84.0 05/18/2022    MCH 27.0 05/18/2022    MCHC 32.1 05/18/2022    RDW 15.2 05/18/2022     05/18/2022    MPV 6.6 05/18/2022     BMP:    Lab Results   Component Value Date     05/18/2022    K 4.0 05/18/2022    CL 95 05/18/2022    CO2 26 05/18/2022    BUN 15 05/18/2022    LABALBU 2.0 05/17/2022    CREATININE 4.7 05/18/2022    CALCIUM 7.9 05/18/2022    GFRAA 15 05/18/2022    LABGLOM 12 05/18/2022    GLUCOSE 99 05/18/2022         Assessment/    - End stage kidney disease on HD Tues-Thurs-Sat   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 Please do not hesitate to call with questions. ____________________________________  Geovany Ochoa MD  [facility]  [email or web address]  Office: [phone]

## 2022-05-18 NOTE — ED NOTES
Pt transported to  via stretcher.  Pt left with all belongings in stable condition at this time     Conchita Jarrett RN  05/17/22 4419

## 2022-05-18 NOTE — CARE COORDINATION
CASE MANAGEMENT INITIAL ASSESSMENT      Reviewed chart and completed assessment with patient:  Family present: Yes wife and daughter  Explained Case Management role/services. Patient and family present    Primary contact information:See below    Health Care Decision Maker :   Primary Decision Maker: Carlos Hines Spouse - [phone]    Secondary Decision Maker: Patrick Murdock Child - [phone]          Can this person be reached and be able to respond quickly, such as within a few minutes or hours? Yes      Admit date/status:inpatient 5/17/2022  Diagnosis:Hyponatremia   Is this a Readmission?:  No      Insurance:BCBS   Precert required for SNF: Yes       3 night stay required: No    Living arrangements, Adls, care needs, prior to admission:Lives at home with spouse and is normally [address] at home:  Walker_X_Cane__RTS__ BSC__Shower Chair__  02__ HHN__ CPAP__  BiPap__  Hospital Bed__ W/C___ Other_____    Services in the home and/or outpatient, prior to admission:None    Current PCP:Juan Francisco Xavier                                Medications:No issues has coverage    Transportation needs: Family    Dialysis Facility (if applicable)   · [facility]   · [address]  · Dialysis Schedule:T-TH-SAT 1030 AM    PT/OT recs:Not seen yet this admission    Hospital Exemption Notification (HEN):N/A    Barriers to discharge:None identified    Plan/comments: To return home with spouse when stable. Open to services if needed. Will follow. Cardiology consulted.      ECOC on chart for MD signature

## 2022-05-18 NOTE — PROGRESS NOTES
Educated patient on purpose of 4 eyes skin assessment and asked patient if allowed to perform, patient verbalized understanding and agreed to assessment. 4 Eyes Skin Assessment     The patient is being assess for  Admission    I agree that 2 RN's have performed a thorough Head to Toe Skin Assessment on the patient. ALL assessment sites listed below have been assessed. Areas assessed by both nurses:   [x]   Head, Face, and Ears   [x]   Shoulders, Back, and Chest  [x]   Arms, Elbows, and Hands   [x]   Coccyx, Sacrum, and IschIum  [x]   Legs, Feet, and Heels        Does the Patient have Skin Breakdown?   No         Papo Prevention initiated:  No  Wound Care Orders initiated:  No      WOC nurse consulted for Pressure Injury (Stage 3,4, Unstageable, DTI, NWPT, and Complex wounds), New and Established Ostomies:  No      Nurse 1 eSignature: Electronically signed by Karlee Olguin RN on 5/17/22 at 10:50 PM EDT    **SHARE this note so that the co-signing nurse is able to place an eSignature**    Nurse 2 eSignature: Nidhi López RN

## 2022-05-18 NOTE — PROGRESS NOTES
Hospitalist Progress Note    CC: Weakness    Hospital course:  66yo WM with PMHX sig for  diet-controlled DM2, HTN, HLD, ESRD on dialysis started 3 weeks ago presented to the ED with complaints of generalized weakness. Patient reports he sees Dr. Hong Obando with nephrology. He was started on dialysis about 3 weeks ago. Since then he has had increasing generalized weakness, decreased appetite. He finds it difficult to ambulate. Today morning he reports he was ambulating, went to dialysis finished his dialysis and after he came home he was very weak. He required assistance to get back into the car to come home. At home he reports he was extremely weak and slowly went down to the ground by his spouse and she could not get him back up on his feet again so she ended up calling EMS. He denies any shortness of breath or chest pain. No syncopal episodes reported. He reports he has not been eating much. Still makes urine. Denied any nausea vomiting or diarrhea, no abdominal pain. Denies any fevers or chills. He has a TDC in his right chest.  Patient reports he was about 200 pounds when he started dialysis, currently at 190 pounds. Admit date: 5/17/2022  Days in hospital:  1    24 Hour Events: pt still feels weak    Subjective: has elevated troponin -started on heparin drip - possible plan for cardiac cath - on ASA and statin - ECHo ordered    ROS:   A comprehensive review of systems was negative except for: weak tired, no chest pain  . Objective:    BP (!) 116/52   Pulse 68   Temp 97.4 °F (36.3 °C) (Oral)   Resp 16   Ht 5' 10\" (1.778 m)   Wt 190 lb (86.2 kg)   SpO2 95%   BMI 27.26 kg/m²     Gen: chronically ill appearing, pale  HEENT: NC/AT, moist mucous membranes, no oropharyngeal erythema or exudate  Neck: supple, trachea midline, no anterior cervical or SC LAD  Heart:  Normal s1/s2, RRR, no murmurs, gallops, or rubs.  no leg edema  Lungs: diminished bilaterally, no wheeze, no rales, no rhonchi, no crackles, no use of accessory muscles  Abd: bowel sounds present, soft, nontender, nondistended, no masses  Extrem:  No clubbing, cyanosis,  no edema  Skin: no rashes or lesions  Psych: A & O x3  Neuro: grossly intact, moves all four extremities. Assessment:    Principal Problem:    Weakness  Active Problems:    Troponin level elevated    DM (diabetes mellitus), type 2 ([facility])    Essential hypertension    Hypothyroidism    Mixed hyperlipidemia    Tobacco use disorder    Hyponatremia    Crescentic glomerulonephritis    Anemia of chronic renal failure    Chronic kidney disease, stage 5 (HCC)  Resolved Problems:    * No resolved hospital problems. *      Plan:  1. NSTEMI - started heparin drip and possible cardiac cath  2. ESRD on HD - for HD every TU, Th, Sat  3. Anemia of CKD - due to increase in troponin - will get 1u pRBC  4.   HTN - controlled    Prognosis:  Fair    Code status:  Full code    DVT prophylaxis: Heparin IV  GI prophylaxis: Proton Pump Inhibitor  Antibiotic prophylaxis indicated:   no  Diet:  Diet NPO    Disposition:  Home with home health    Medications:  Scheduled Meds:   aspirin  81 mg Oral Daily    atorvastatin  20 mg Oral Daily    calcium elemental  500 mg Oral Daily    carvedilol  3.125 mg Oral BID WC    doxazosin  1 mg Oral Daily    levothyroxine  137 mcg Oral Daily    amLODIPine  10 mg Oral Daily    sodium chloride flush  5-40 mL IntraVENous 2 times per day       PRN Meds:  heparin (porcine), heparin (porcine), sodium chloride flush, sodium chloride, ondansetron **OR** ondansetron, polyethylene glycol, acetaminophen **OR** acetaminophen, nitroGLYCERIN, sodium chloride    IV:   heparin (PORCINE) Infusion 1,200 Units/hr (05/18/22 0513)    sodium chloride      sodium chloride           Intake/Output Summary (Last 24 hours) at 5/18/2022 0734  Last data filed at 5/18/2022 0443  Gross per 24 hour   Intake 0 ml   Output 0 ml Net 0 ml       Results:  CBC:   Recent Labs     05/17/22  1828 05/18/22  0231   WBC 15.8* 13.9*   HGB 7.5* 7.1*   HCT 23.1* 22.0*   MCV 83.5 84.0    363     BMP:   Recent Labs     05/17/22  1553 05/18/22  0231   * 131*   K 3.1* 4.0   CL 91* 95*   CO2 28 26   BUN 11 15   CREATININE 3.7* 4.7*     Mag: No results for input(s): MAG in the last 72 hours. Phos:   Lab Results   Component Value Date    PHOS 3.7 08/31/2018     No results found for: GLU    LIVER PROFILE:   Recent Labs     05/17/22  1553   AST 27   ALT 17   LIPASE 12.0*   BILITOT 0.5   ALKPHOS 402*     PT/INR: No results for input(s): PROTIME, INR in the last 72 hours. APTT:   Recent Labs     05/17/22 1828   APTT 24.0*     UA:No results for input(s): NITRITE, COLORU, PHUR, LABCAST, WBCUA, RBCUA, MUCUS, TRICHOMONAS, YEAST, BACTERIA, CLARITYU, SPECGRAV, LEUKOCYTESUR, UROBILINOGEN, BILIRUBINUR, BLOODU, GLUCOSEU, AMORPHOUS in the last 72 hours.     Invalid input(s): Mercedes Parks input(s): ABG  Lab Results   Component Value Date    CALCIUM 7.9 (L) 05/18/2022    PHOS 3.7 08/31/2018               Electronically signed by Jacqueline Jaramillo MD on 5/18/2022 at 7:13 AM

## 2022-05-19 PROBLEM — E11.29 TYPE 2 DIABETES MELLITUS WITH RENAL COMPLICATION (HCC): Status: ACTIVE | Noted: 2018-02-16

## 2022-05-19 LAB
ALBUMIN SERPL-MCNC: 2 G/DL (ref 3.4–5)
AMORPHOUS: ABNORMAL /HPF
ANION GAP SERPL CALCULATED.3IONS-SCNC: 9 MMOL/L (ref 3–16)
ANTI-XA UNFRAC HEPARIN: 0.21 IU/ML (ref 0.3–0.7)
BILIRUBIN URINE: NEGATIVE
BLOOD, URINE: ABNORMAL
BUN BLDV-MCNC: 25 MG/DL (ref 7–20)
CALCIUM SERPL-MCNC: 7.9 MG/DL (ref 8.3–10.6)
CHLORIDE BLD-SCNC: 95 MMOL/L (ref 99–110)
CLARITY: CLEAR
CO2: 26 MMOL/L (ref 21–32)
COLOR: YELLOW
CREAT SERPL-MCNC: 6.3 MG/DL (ref 0.8–1.3)
FOLATE: 9.87 NG/ML (ref 4.78–24.2)
GFR AFRICAN AMERICAN: 11
GFR NON-AFRICAN AMERICAN: 9
GLUCOSE BLD-MCNC: 136 MG/DL (ref 70–99)
GLUCOSE URINE: NEGATIVE MG/DL
HCT VFR BLD CALC: 25.2 % (ref 40.5–52.5)
HEMOGLOBIN: 8.2 G/DL (ref 13.5–17.5)
KETONES, URINE: NEGATIVE MG/DL
LEUKOCYTE ESTERASE, URINE: NEGATIVE
LV EF: 58 %
LVEF MODALITY: NORMAL
MCH RBC QN AUTO: 26.9 PG (ref 26–34)
MCHC RBC AUTO-ENTMCNC: 32.5 G/DL (ref 31–36)
MCV RBC AUTO: 82.8 FL (ref 80–100)
MICROSCOPIC EXAMINATION: YES
MUCUS: ABNORMAL /LPF
NITRITE, URINE: NEGATIVE
PDW BLD-RTO: 14.8 % (ref 12.4–15.4)
PH UA: 7.5 (ref 5–8)
PHOSPHORUS: 2.5 MG/DL (ref 2.5–4.9)
PLATELET # BLD: 414 K/UL (ref 135–450)
PMV BLD AUTO: 6.9 FL (ref 5–10.5)
POTASSIUM SERPL-SCNC: 3.8 MMOL/L (ref 3.5–5.1)
PROTEIN UA: 30 MG/DL
RBC # BLD: 3.04 M/UL (ref 4.2–5.9)
RBC UA: ABNORMAL /HPF (ref 0–4)
SODIUM BLD-SCNC: 130 MMOL/L (ref 136–145)
SPECIFIC GRAVITY UA: 1.01 (ref 1–1.03)
URINE TYPE: ABNORMAL
UROBILINOGEN, URINE: 0.2 E.U./DL
VITAMIN B-12: 1486 PG/ML (ref 211–911)
WBC # BLD: 15 K/UL (ref 4–11)
WBC UA: ABNORMAL /HPF (ref 0–5)

## 2022-05-19 PROCEDURE — 2060000000 HC ICU INTERMEDIATE R&B

## 2022-05-19 PROCEDURE — 6370000000 HC RX 637 (ALT 250 FOR IP): Performed by: INTERNAL MEDICINE

## 2022-05-19 PROCEDURE — 93306 TTE W/DOPPLER COMPLETE: CPT

## 2022-05-19 PROCEDURE — 6360000002 HC RX W HCPCS: Performed by: INTERNAL MEDICINE

## 2022-05-19 PROCEDURE — 80069 RENAL FUNCTION PANEL: CPT

## 2022-05-19 PROCEDURE — 6360000002 HC RX W HCPCS

## 2022-05-19 PROCEDURE — 99233 SBSQ HOSP IP/OBS HIGH 50: CPT | Performed by: INTERNAL MEDICINE

## 2022-05-19 PROCEDURE — 5A1D70Z PERFORMANCE OF URINARY FILTRATION, INTERMITTENT, LESS THAN 6 HOURS PER DAY: ICD-10-PCS | Performed by: INTERNAL MEDICINE

## 2022-05-19 PROCEDURE — 90935 HEMODIALYSIS ONE EVALUATION: CPT

## 2022-05-19 PROCEDURE — 2580000003 HC RX 258: Performed by: INTERNAL MEDICINE

## 2022-05-19 PROCEDURE — 36415 COLL VENOUS BLD VENIPUNCTURE: CPT

## 2022-05-19 PROCEDURE — 85520 HEPARIN ASSAY: CPT

## 2022-05-19 PROCEDURE — 85027 COMPLETE CBC AUTOMATED: CPT

## 2022-05-19 PROCEDURE — 81001 URINALYSIS AUTO W/SCOPE: CPT

## 2022-05-19 RX ORDER — HEPARIN SODIUM 5000 [USP'U]/ML
5000 INJECTION, SOLUTION INTRAVENOUS; SUBCUTANEOUS EVERY 8 HOURS SCHEDULED
Status: DISCONTINUED | OUTPATIENT
Start: 2022-05-19 | End: 2022-05-26 | Stop reason: HOSPADM

## 2022-05-19 RX ORDER — HEPARIN SODIUM 1000 [USP'U]/ML
2000 INJECTION, SOLUTION INTRAVENOUS; SUBCUTANEOUS ONCE
Status: COMPLETED | OUTPATIENT
Start: 2022-05-19 | End: 2022-05-19

## 2022-05-19 RX ORDER — HEPARIN SODIUM 1000 [USP'U]/ML
3700 INJECTION, SOLUTION INTRAVENOUS; SUBCUTANEOUS PRN
Status: DISCONTINUED | OUTPATIENT
Start: 2022-05-19 | End: 2022-05-26 | Stop reason: HOSPADM

## 2022-05-19 RX ORDER — HEPARIN SODIUM 1000 [USP'U]/ML
INJECTION, SOLUTION INTRAVENOUS; SUBCUTANEOUS
Status: DISPENSED
Start: 2022-05-19 | End: 2022-05-19

## 2022-05-19 RX ADMIN — SODIUM CHLORIDE, PRESERVATIVE FREE 10 ML: 5 INJECTION INTRAVENOUS at 21:44

## 2022-05-19 RX ADMIN — EPOETIN ALFA-EPBX 10000 UNITS: 10000 INJECTION, SOLUTION INTRAVENOUS; SUBCUTANEOUS at 10:45

## 2022-05-19 RX ADMIN — HEPARIN SODIUM 5000 UNITS: 5000 INJECTION INTRAVENOUS; SUBCUTANEOUS at 16:18

## 2022-05-19 RX ADMIN — HEPARIN SODIUM 5000 UNITS: 5000 INJECTION INTRAVENOUS; SUBCUTANEOUS at 21:43

## 2022-05-19 RX ADMIN — DOXAZOSIN 1 MG: 2 TABLET ORAL at 14:17

## 2022-05-19 RX ADMIN — CARVEDILOL 3.12 MG: 3.12 TABLET, FILM COATED ORAL at 16:17

## 2022-05-19 RX ADMIN — HEPARIN SODIUM 1650 UNITS/HR: 10000 INJECTION, SOLUTION INTRAVENOUS at 08:38

## 2022-05-19 RX ADMIN — MEGESTROL ACETATE 625 MG: 40 SUSPENSION ORAL at 08:28

## 2022-05-19 RX ADMIN — ASPIRIN 81 MG 81 MG: 81 TABLET ORAL at 08:28

## 2022-05-19 RX ADMIN — HEPARIN SODIUM 2000 UNITS: 1000 INJECTION INTRAVENOUS; SUBCUTANEOUS at 05:19

## 2022-05-19 RX ADMIN — CALCIUM 500 MG: 500 TABLET ORAL at 14:11

## 2022-05-19 RX ADMIN — ATORVASTATIN CALCIUM 20 MG: 10 TABLET, FILM COATED ORAL at 08:28

## 2022-05-19 RX ADMIN — AMLODIPINE BESYLATE 10 MG: 5 TABLET ORAL at 14:11

## 2022-05-19 NOTE — PROGRESS NOTES
Hospitalist Progress Note    CC: NSTEMI (non-ST elevated myocardial infarction) [facility])    Hospital course:  66yo WM with PMHX sig for  diet-controlled DM2, HTN, HLD, ESRD on dialysis started 3 weeks ago presented to the ED with complaints of generalized weakness. Patient reports he sees Dr. Omero Muniz with nephrology. He was started on dialysis about 3 weeks ago. Since then he has had increasing generalized weakness, decreased appetite. He finds it difficult to ambulate. Today morning he reports he was ambulating, went to dialysis finished his dialysis and after he came home he was very weak. He required assistance to get back into the car to come home. At home he reports he was extremely weak and slowly went down to the ground by his spouse and she could not get him back up on his feet again so she ended up calling EMS. He denies any shortness of breath or chest pain. No syncopal episodes reported. He reports he has not been eating much. Still makes urine. Denied any nausea vomiting or diarrhea, no abdominal pain. Denies any fevers or chills. He has a TDC in his right chest.  Patient reports he was about 200 pounds when he started dialysis, currently at 190 pounds. Admit date: 5/17/2022  Days in hospital:  2    24 Hour Events: pt still feels weak    Subjective: has elevated troponin -started on heparin drip - possible plan for cardiac cath - on ASA and statin - ECHo ordered    ROS:   A comprehensive review of systems was negative except for: weak tired, no chest pain  .     Objective:    BP (!) 124/49   Pulse 73   Temp 98.2 °F (36.8 °C) (Oral)   Resp 16   Ht 5' 10\" (1.778 m)   Wt 187 lb 6.3 oz (85 kg)   SpO2 96%   BMI 26.89 kg/m²     Gen: chronically ill appearing, pale  HEENT: NC/AT, moist mucous membranes, no oropharyngeal erythema or exudate  Neck: supple, trachea midline, no anterior cervical or SC LAD  Heart:  Normal s1/s2, RRR, no murmurs, gallops, or rubs. no leg edema  Lungs:  diminished bilaterally, no wheeze, no rales, no rhonchi, no crackles, no use of accessory muscles  Abd: bowel sounds present, soft, nontender, nondistended, no masses  Extrem:  No clubbing, cyanosis,  no edema  Skin: no rashes or lesions  Psych: A & O x3  Neuro: grossly intact, moves all four extremities. ECHO:  Summary    Normal left ventricular systolic function with ejection fraction of 55-60%.    No regional wall motion abnormalites are seen.   Carrie Mat dilated size left ventricle.    Grade I diastolic dysfunction with normal filling pressure.    Mild mitral regurgitation.    Moderate to severe aortic stenosis.    Systolic pulmonic artery pressure (SPAP) is normal estimated at 35 mmHg    (Right atrial pressure of 3 mmHg).    Compared to previous study from 5-4-2018, there are no changes noted in left    ventricular function. Assessment:    Principal Problem:    NSTEMI (non-ST elevated myocardial infarction) ([facility] [address]  Active Problems:    Weakness    Elevated troponin    Abnormal ECG    PVC (premature ventricular contraction)    Murmur    Anemia in other chronic diseases classified elsewhere    Type 2 diabetes mellitus with renal complication (HCC)    Essential hypertension    Hypothyroidism    Mixed hyperlipidemia    Tobacco use disorder    Hyponatremia    Crescentic glomerulonephritis    Anemia of chronic renal failure    Chronic kidney disease, stage 5 ([facility])  Resolved Problems:    * No resolved hospital problems. *      Plan:  1. NSTEMI -  - holding off on cardiac cath for now since troponin is elevated but stable - will likely need cath as outpatient especially if plans to move forward with aortic valve replacement - heparin will be stopped  2. ESRD on HD - for HD every TU, Th, Sat  3. Anemia of CKD - due to increase in troponin - will get 1u pRBC - feels better after transfusion  4. HTN - controlled  5. DMII with ESRD - controlled  6.   Hyponatremia - mild and improved    Prognosis:  Fair    Code status:  Full code    DVT prophylaxis: Heparin SQ  GI prophylaxis: Proton Pump Inhibitor  Antibiotic prophylaxis indicated:   no  Diet:  Diet NPO Exceptions are: Sips of Water with Meds  ADULT DIET; Regular; 5 carb choices (75 gm/meal); Low Fat/Low Chol/High Fiber/JESUS; Low Potassium (Less than 3000 mg/day); Low Phosphorus (Less than 1000 mg)    Disposition:  Home with home health    Medications:  Scheduled Meds:   heparin (porcine)        heparin (porcine)  5,000 Units SubCUTAneous 3 times per day    epoetin katelynn-epbx  10,000 Units IntraVENous Once per day on Tue Thu Sat    megestrol  625 mg Oral Daily    aspirin  81 mg Oral Daily    atorvastatin  20 mg Oral Daily    calcium elemental  500 mg Oral Daily    carvedilol  3.125 mg Oral BID WC    doxazosin  1 mg Oral Daily    levothyroxine  137 mcg Oral Daily    amLODIPine  10 mg Oral Daily    sodium chloride flush  5-40 mL IntraVENous 2 times per day       PRN Meds:  regadenoson, heparin (porcine), sodium chloride flush, sodium chloride, ondansetron **OR** ondansetron, polyethylene glycol, acetaminophen **OR** acetaminophen, nitroGLYCERIN, sodium chloride    IV:   sodium chloride      sodium chloride           Intake/Output Summary (Last 24 hours) at 5/19/2022 1553  Last data filed at 5/19/2022 0401  Gross per 24 hour   Intake 0 ml   Output 0 ml   Net 0 ml       Results:  CBC:   Recent Labs     05/17/22  1828 05/18/22  0231 05/19/22  0427   WBC 15.8* 13.9* 15.0*   HGB 7.5* 7.1* 8.2*   HCT 23.1* 22.0* 25.2*   MCV 83.5 84.0 82.8    363 414     BMP:   Recent Labs     05/17/22  1553 05/18/22  0231 05/19/22  0427   * 131* 130*   K 3.1* 4.0 3.8   CL 91* 95* 95*   CO2 28 26 26   PHOS  --   --  2.5   BUN 11 15 25*   CREATININE 3.7* 4.7* 6.3*     Mag: No results for input(s): MAG in the last 72 hours.   Phos:   Lab Results   Component Value Date    PHOS 2.5 05/19/2022     No results found for: GLU    LIVER PROFILE:   Recent Labs     05/17/22  1553   AST 27   ALT 17   LIPASE 12.0*   BILITOT 0.5   ALKPHOS 402*     PT/INR: No results for input(s): PROTIME, INR in the last 72 hours. APTT:   Recent Labs     05/17/22  1828   APTT 24.0*     UA:No results for input(s): NITRITE, COLORU, PHUR, LABCAST, WBCUA, RBCUA, MUCUS, TRICHOMONAS, YEAST, BACTERIA, CLARITYU, SPECGRAV, LEUKOCYTESUR, UROBILINOGEN, BILIRUBINUR, BLOODU, GLUCOSEU, AMORPHOUS in the last 72 hours.     Invalid input(s): Tihs Tracy input(s): ABG  Lab Results   Component Value Date    CALCIUM 7.9 (L) 05/19/2022    PHOS 2.5 05/19/2022               Electronically signed by Elualio Cm MD on 5/19/2022 at 3:53 PM

## 2022-05-19 NOTE — CARE COORDINATION
Anemic. Received blood transfusion. Elevated troponin in setting of severe CKD on HD. Nephrology following. Pretty[place]quinton Caballero t-th-sat 7122 AM chair time is normal outpatient schedule. On heparin gtt. Echo pending. From home with spouse. Open to services if needed. Patient extremely weak. Normally independent with adl's but does not drive. Wife drives to and from HD.

## 2022-05-19 NOTE — FLOWSHEET NOTE
05/19/22 0922 05/19/22 1255   Vital Signs   /63 (!) 115/54   Temp 98.1 °F (36.7 °C) 98 °F (36.7 °C)   Pulse 67 73   Resp 18 16   Weight 191 lb 2.2 oz (86.7 kg) 187 lb 6.3 oz (85 kg)   Weight Method Bed scale Bed scale   Percent Weight Change -0.34 -1.96   Treatment Initiation   Dialyze Hours 3  --    Post-Hemodialysis Assessment   Rinseback Volume (ml)  --  400 ml   Total Liters Processed (l/min)  --  49.5 l/min   Dialyzer Clearance  --  Lightly streaked   Hemodialysis Output (ml)  --  2200 ml   NET Removed (ml)  --  2200   Treatment time:  3  Hours  Net UF  1700 :  ML    Pre weight:  86.7 Kg  Post weight: 85  kg  EDW: 85 kg    Access used: RCW TDC  Access function: Fair with  to 400 ml/min    Medications or blood products given: Retacrit 94547 units    Regular outpatient schedule: LINSEY Lynch    Summary of response to treatment: Tolerated tx well,  BP soft t/o tx but SBP never below 100. Copy of dialysis treatment record placed in chart, to be scanned into EMR. Report to  Yue Deng RN.

## 2022-05-19 NOTE — PROGRESS NOTES
Thomas Hensley   Patient:   Rachael Dong    YOB: 1950  MRN:   [id number]  Location: [facility] 790359       5/19/22 5:43 AM  [phone] Hospital or Facility: [facility] From: [facility] THE RE: Ifeanyi Proctor 1950 RM: 0431-01 HD pt Cr of 6.3 Need Callback: NO CALLBACK REQ C4 PROGRESSIVE CARE FYI  Unread

## 2022-05-19 NOTE — PROGRESS NOTES
[facility] Progress Note           Subjective/   66y.o. year old male who we are seeing in consultation for ESKD on HD. HPI:  Seen on dialysis. Orders confirmed. Pre-weight at HD today 86.7 kg. ROS:  States appetite improving, no shortness of breath. Objective/   GEN:  Chronically ill, /64   Pulse 67   Temp 98.7 °F (37.1 °C) (Oral)   Resp 16   Ht 5' 10\" (1.778 m)   Wt 191 lb 12.8 oz (87 kg)   SpO2 95%   BMI 27.52 kg/m²   HEENT: non-icteric, no JVD  CV: S1, S2 without m/r/g; no LE edema  RESP: CTA B without w/r/r; breathing wnl  ABD: +bs, soft, nt, no hsm  SKIN: warm, no rashes  ACCESS: R IJ TDC c/d/i    Data/  Recent Labs     05/17/22  1828 05/18/22  0231 05/19/22  0427   WBC 15.8* 13.9* 15.0*   HGB 7.5* 7.1* 8.2*   HCT 23.1* 22.0* 25.2*   MCV 83.5 84.0 82.8    363 414     Recent Labs     05/17/22  1553 05/18/22  0231 05/19/22  0427   * 131* 130*   K 3.1* 4.0 3.8   CL 91* 95* 95*   CO2 28 26 26   GLUCOSE 111* 99 136*   PHOS  --   --  2.5   MG 1.90  --   --    BUN 11 15 25*   CREATININE 3.7* 4.7* 6.3*   LABGLOM 16* 12* 9*   GFRAA 20* 15* 11*       Assessment/     - End stage kidney disease on HD Tues-Thurs-Sat              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

## 2022-05-19 NOTE — PROGRESS NOTES
[address]   Daily Cardiovascular Progress Note    Admit Date: 5/17/2022    Chief complaint: fatigue  HPI: a little better following transfusion      Medications/Labs all Reviewed:  Patient Active Problem List   Diagnosis    DM [facility]diabetes mellitus), type 2 ([facility]    Essential hypertension    Hypothyroidism    Mixed hyperlipidemia    Tobacco use disorder    Hyponatremia    Acute infective pericarditis    Pulmonary nodule    Sepsis ([facility]    [facility] failure) [facility]    Crescentic glomerulonephritis    Anemia of chronic renal failure    NSTEMI (non-ST elevated myocardial infarction) ([facility])    Chronic kidney disease, stage 5 ([facility])    Iron deficiency anemia    Weakness    Elevated troponin    Abnormal ECG    PVC (premature ventricular contraction)    Murmur    Anemia in other chronic diseases classified elsewhere       Medications:  regadenoson (LEXISCAN) injection 0.4 mg, ONCE PRN  heparin (porcine) injection 3,700 Units, PRN  heparin (porcine) 1000 UNIT/ML injection,   epoetin katelynn-epbx (RETACRIT) injection 10,000 Units, Once per day on Tue Thu Sat  megestrol (MEGACE) 40 MG/ML suspension 625 mg, Daily  aspirin chewable tablet 81 mg, Daily  atorvastatin (LIPITOR) tablet 20 mg, Daily  calcium elemental (OSCAL) tablet 500 mg, Daily  carvedilol (COREG) tablet 3.125 mg, BID WC  doxazosin (CARDURA) tablet 1 mg, Daily  levothyroxine (SYNTHROID) tablet 137 mcg, Daily  amLODIPine (NORVASC) tablet 10 mg, Daily  sodium chloride flush 0.9 % injection 5-40 mL, 2 times per day  sodium chloride flush 0.9 % injection 5-40 mL, PRN  0.9 % sodium chloride infusion, PRN  ondansetron (ZOFRAN-ODT) disintegrating tablet 4 mg, Q8H PRN   Or  ondansetron (ZOFRAN) injection 4 mg, Q6H PRN  polyethylene glycol (GLYCOLAX) packet 17 g, Daily PRN  acetaminophen (TYLENOL) tablet 650 mg, Q6H PRN   Or  acetaminophen (TYLENOL) suppository 650 mg, Q6H PRN  nitroGLYCERIN (NITROSTAT) SL tablet 0.4 mg, Q5 Min PRN  0.9 % sodium chloride infusion, PRN           PHYSICAL EXAM   BP (!) 124/49   Pulse 73   Temp 98.2 °F (36.8 °C) (Oral)   Resp 16   Ht 5' 10\" (1.778 m)   Wt 187 lb 6.3 oz (85 kg)   SpO2 96%   BMI 26.89 kg/m²    Vitals:    05/19/22 0815 05/19/22 0922 05/19/22 1255 05/19/22 1323   BP: 123/64 120/63 (!) 115/54 (!) 124/49   Pulse: 67 67 73 73   Resp: 16 18 16 16   Temp: 98.7 °F (37.1 °C) 98.1 °F (36.7 °C) 98 °F (36.7 °C) 98.2 °F (36.8 °C)   TempSrc: Oral   Oral   SpO2: 95%   96%   Weight:  191 lb 2.2 oz (86.7 kg) 187 lb 6.3 oz (85 kg)    Height:             Intake/Output Summary (Last 24 hours) at 5/19/2022 1324  Last data filed at 5/19/2022 0401  Gross per 24 hour   Intake 0 ml   Output 0 ml   Net 0 ml     Wt Readings from Last 3 Encounters:   05/19/22 187 lb 6.3 oz (85 kg)   06/08/18 223 lb (101.2 kg)   06/08/18 223 lb (101.2 kg)         Gen: Patient in NAD, resting comfortably  Neck: No JVD or bruits  Respiratory: CTAB no WRR  Chest: normal without deformity  Cardiovascular:RRR, Y5F4, 3/6systolic RUSB mid/late peaking, normal PMI  Abdomen: Soft, NTND, Normal BS  Extremities: No clubbing, cyanosis, or edema  Neurological/Psychiatric: AxO x4, No gross motors/sensory deficits  Skin:  Warm and dry      Labs:  CBC:   Recent Labs     05/17/22  1828 05/18/22  0231 05/19/22  0427   WBC 15.8* 13.9* 15.0*   HGB 7.5* 7.1* 8.2*   HCT 23.1* 22.0* 25.2*   MCV 83.5 84.0 82.8    363 414     BMP:   Recent Labs     05/17/22  1553 05/18/22  0231 05/19/22  0427   * 131* 130*   K 3.1* 4.0 3.8   CL 91* 95* 95*   CO2 28 26 26   PHOS  --   --  2.5   BUN 11 15 25*   CREATININE 3.7* 4.7* 6.3*     MG:    Recent Labs     05/17/22  1553   MG 1.90      PT/INR: No results for input(s): PROTIME, INR in the last 72 hours.   APTT:   Recent Labs     05/17/22  1828   APTT 24.0*     Cardiac Enzymes:   Recent Labs     05/17/22  1847 05/17/22  2233 05/18/22  0231   TROPONINI 0.55* 0.54* 0.54*       Cardiac Studies:          Assessment and Plan     1. Elevated troponin: flat  2. Abnormal ECG  3. PVCs  4. Hx of Pericarditis 2/16/2018              - pt did not f/u with Cardiology following dx  5. Aortic stenosis: mod-severe   Vmax 3.9m/s and mean gradient 23mmHg (max 60mmhgc)  6. Hyponatremia  7. Normocytic Anemia: from CKD  8. ESRD: on dialysis              - Necrotizing and Crescentic Glomerulonephritis, in setting of Coxsackie pericarditis with +C-ANCA        PLAN  1. Normal LVEF and wall motion on echo reassuring  2. Aortic stenosis mod-severe, pretty much severe   - will need to start workup vs very close observation  3. Did have a long discussion with patient regarding his echo findings reassurance that EF is normal concern for aortic valve and the fact that we be followed closely and may need repair/replacement soon. He seemed to be fairly tired and not in the mental status to except this news but was very pleasant. I did express to him that we may consider a lot of this work-up as an outpatient once he is clinically improved from his current severe fatigue. Of note I do not think his current symptoms are coming from his aortic valve. 4.  Stress test on hold and tentatively for tomorrow given patient's symptoms. Okay with proceeding but ultimately if aortic valve replacement is needed he would need a cardiac cath regardless some okay deferring this to the outpatient setting the patient does not feel well to proceed  5. DC IV heparin  6.  Cont ASA, lipitor, coreg-continue blood pressure control              - ACE/ARB on hold with CKD              - check lipids, titrate statin    Patient Active Problem List   Diagnosis    DM (diabetes mellitus), type 2 ([address]    Essential hypertension    Hypothyroidism    Mixed hyperlipidemia    Tobacco use disorder    Hyponatremia    Acute infective pericarditis    Pulmonary nodule    Sepsis ([address]    ARF (acute renal failure) (HCC)    Crescentic glomerulonephritis    Anemia of chronic renal failure    NSTEMI (non-ST elevated myocardial infarction) (HCC)    Chronic kidney disease, stage 5 (HCC)    Iron deficiency anemia    Weakness    Elevated troponin    Abnormal ECG    PVC (premature ventricular contraction)    Murmur    Anemia in other chronic diseases classified elsewhere         Thank you for allowing me to participate in the care of your patient. Please call me with any questions [phone].       Gisella Cleaning MD, [address] [facility] Cardiologist  [facility]  [phone] [facility]  [phone] [address] [place] [address]  5/19/2022 1:24 PM

## 2022-05-20 ENCOUNTER — APPOINTMENT (OUTPATIENT)
Dept: NUCLEAR MEDICINE | Age: 72
DRG: 280 | End: 2022-05-20
Payer: MEDICARE

## 2022-05-20 ENCOUNTER — TELEPHONE (OUTPATIENT)
Dept: CARDIOLOGY | Age: 72
End: 2022-05-20

## 2022-05-20 LAB
CHOLESTEROL, TOTAL: 72 MG/DL (ref 0–199)
HDLC SERPL-MCNC: 22 MG/DL (ref 40–60)
LDL CHOLESTEROL CALCULATED: 31 MG/DL
LV EF: 57 %
LVEF MODALITY: NORMAL
TRIGL SERPL-MCNC: 94 MG/DL (ref 0–150)
VLDLC SERPL CALC-MCNC: 19 MG/DL

## 2022-05-20 PROCEDURE — 6370000000 HC RX 637 (ALT 250 FOR IP): Performed by: INTERNAL MEDICINE

## 2022-05-20 PROCEDURE — A9502 TC99M TETROFOSMIN: HCPCS | Performed by: INTERNAL MEDICINE

## 2022-05-20 PROCEDURE — 36415 COLL VENOUS BLD VENIPUNCTURE: CPT

## 2022-05-20 PROCEDURE — 97162 PT EVAL MOD COMPLEX 30 MIN: CPT

## 2022-05-20 PROCEDURE — 97166 OT EVAL MOD COMPLEX 45 MIN: CPT

## 2022-05-20 PROCEDURE — 97530 THERAPEUTIC ACTIVITIES: CPT

## 2022-05-20 PROCEDURE — 99232 SBSQ HOSP IP/OBS MODERATE 35: CPT | Performed by: NURSE PRACTITIONER

## 2022-05-20 PROCEDURE — 78452 HT MUSCLE IMAGE SPECT MULT: CPT

## 2022-05-20 PROCEDURE — 80061 LIPID PANEL: CPT

## 2022-05-20 PROCEDURE — 3430000000 HC RX DIAGNOSTIC RADIOPHARMACEUTICAL: Performed by: INTERNAL MEDICINE

## 2022-05-20 PROCEDURE — 93017 CV STRESS TEST TRACING ONLY: CPT

## 2022-05-20 PROCEDURE — 2580000003 HC RX 258: Performed by: INTERNAL MEDICINE

## 2022-05-20 PROCEDURE — 2060000000 HC ICU INTERMEDIATE R&B

## 2022-05-20 PROCEDURE — 6360000002 HC RX W HCPCS: Performed by: INTERNAL MEDICINE

## 2022-05-20 RX ADMIN — CALCIUM 500 MG: 500 TABLET ORAL at 10:35

## 2022-05-20 RX ADMIN — MEGESTROL ACETATE 625 MG: 40 SUSPENSION ORAL at 10:35

## 2022-05-20 RX ADMIN — SODIUM CHLORIDE, PRESERVATIVE FREE 10 ML: 5 INJECTION INTRAVENOUS at 21:23

## 2022-05-20 RX ADMIN — SODIUM CHLORIDE, PRESERVATIVE FREE 10 ML: 5 INJECTION INTRAVENOUS at 13:02

## 2022-05-20 RX ADMIN — TETROFOSMIN 32.6 MILLICURIE: 1.38 INJECTION, POWDER, LYOPHILIZED, FOR SOLUTION INTRAVENOUS at 08:34

## 2022-05-20 RX ADMIN — HEPARIN SODIUM 5000 UNITS: 5000 INJECTION INTRAVENOUS; SUBCUTANEOUS at 06:22

## 2022-05-20 RX ADMIN — ASPIRIN 81 MG 81 MG: 81 TABLET ORAL at 10:35

## 2022-05-20 RX ADMIN — ATORVASTATIN CALCIUM 20 MG: 10 TABLET, FILM COATED ORAL at 10:35

## 2022-05-20 RX ADMIN — CARVEDILOL 3.12 MG: 3.12 TABLET, FILM COATED ORAL at 16:54

## 2022-05-20 RX ADMIN — LEVOTHYROXINE SODIUM 137 MCG: 0.03 TABLET ORAL at 06:22

## 2022-05-20 RX ADMIN — HEPARIN SODIUM 5000 UNITS: 5000 INJECTION INTRAVENOUS; SUBCUTANEOUS at 14:45

## 2022-05-20 RX ADMIN — AMLODIPINE BESYLATE 10 MG: 5 TABLET ORAL at 10:35

## 2022-05-20 RX ADMIN — HEPARIN SODIUM 5000 UNITS: 5000 INJECTION INTRAVENOUS; SUBCUTANEOUS at 21:23

## 2022-05-20 RX ADMIN — DOXAZOSIN 1 MG: 2 TABLET ORAL at 10:35

## 2022-05-20 RX ADMIN — REGADENOSON 0.4 MG: 0.08 INJECTION, SOLUTION INTRAVENOUS at 08:34

## 2022-05-20 RX ADMIN — CARVEDILOL 3.12 MG: 3.12 TABLET, FILM COATED ORAL at 10:47

## 2022-05-20 RX ADMIN — TETROFOSMIN 11.5 MILLICURIE: 1.38 INJECTION, POWDER, LYOPHILIZED, FOR SOLUTION INTRAVENOUS at 07:18

## 2022-05-20 ASSESSMENT — ENCOUNTER SYMPTOMS
RESPIRATORY NEGATIVE: 1
GASTROINTESTINAL NEGATIVE: 1

## 2022-05-20 NOTE — PROGRESS NOTES
Physical Therapy  Facility/Department: [facility] C4 PCU  Physical Therapy Initial Assessment    Name: Narciso Ludwig  : 1950  MRN: [id number]  Date of Service: 2022    Discharge Recommendations:  Subacute/Skilled Nursing Facility   PT Equipment Recommendations  Equipment Needed: No      Patient Diagnosis(es): The primary encounter diagnosis was NSTEMI (non-ST elevated myocardial infarction) ([facility] Diagnoses of Generalized weakness, Anemia, unspecified type, and Hyponatremia were also pertinent to this visit. Past Medical History:  has a past medical history of Anemia, Diabetes mellitus ([facility] [address] [facility] ESRD (end stage renal disease) on dialysis ([facility] [facility] Hyperlipidemia, and Hypertension. Past Surgical History:  has no past surgical history on file. Assessment   Body Structures, Functions, Activity Limitations Requiring Skilled Therapeutic Intervention: Decreased functional mobility ; Decreased strength;Decreased safe awareness;Decreased balance;Decreased endurance  Assessment: Pt presents to [facility] with generalized weakness. Pt began HD about 3 weeks ago. At baseline, pt performed functional mobility independently, but has needed increased assist as of late. Currently, pt requires mod(A)X2 for bed mobility and refused transfer and gait training. Pt and spouse educated on benefits for therapy and importance of participation and risk of bed rest. Pt would benefit from continued skilled PT to address current deficits. Recommend SNF upon d/c due to current deficits.   Treatment Diagnosis: impaired functional mobility  Therapy Prognosis: Good  Decision Making: Medium Complexity  Requires PT Follow-Up: Yes  Activity Tolerance  Activity Tolerance: Patient tolerated evaluation without incident;Patient limited by fatigue     Plan   Plan  Plan: 3-5 times per week  Current Treatment Recommendations: Strengthening,Balance training,Functional mobility training,Transfer training,Endurance training,Neuromuscular re-education,Stair training,Gait training,Pain management,Home exercise program,Safety education & training,Patient/Caregiver education & training,Therapeutic activities  Safety Devices  Type of Devices: Call light within reach,Left in bed,Bed alarm in place,Nurse notified     Restrictions  Restrictions/Precautions  Restrictions/Precautions: General Precautions,NPO,Bed Alarm  Position Activity Restriction  Other position/activity restrictions: TDC, NPO pending stress test, IV     Subjective   General  Chart Reviewed: Yes  Patient assessed for rehabilitation services?: Yes  Response To Previous Treatment: Not applicable  Family / Caregiver Present: Yes (spouse)  Referring Practitioner: Joselin Johns MD  Referral Date : 05/20/22  Diagnosis: Hyponatremia  Follows Commands: Impaired  General Comment  Comments: RN cleared pt for PT evaluation  Subjective  Subjective: Pt supine in bed upon arrival, awakens to tactile stimuli.  Hesistant to mobility due to fatigue, but agreeable with education         Social/Functional History  Social/Functional History  Lives With: Spouse  Type of Home: House  Home Layout: One level  Home Access: Stairs to enter with rails  Entrance Stairs - Number of Steps: 5 BALTAZAR  Bathroom Shower/Tub: Walk-in shower  Bathroom Toilet: Handicap height (pt uses sink to assist in standing)  Bathroom Equipment: Grab bars in shower (pt performs sponge baths)  Home Equipment: Rollator,Wheelchair-manual (uses walker always)  Has the patient had two or more falls in the past year or any fall with injury in the past year?: No  Receives Help From: Family  ADL Assistance: Needs assistance (Pts wife has needed to assist in the past 3 weeks)  Homemaking Responsibilities: No  Ambulation Assistance: Independent  Transfer Assistance: Independent  Active : No  Occupation: Retired  Additional Comments: Pt with poor participation throughout session     Vision/Hearing  Vision Exceptions: Wears glasses at all times  Hearing: Within functional limits      Cognition   Orientation  Overall Orientation Status: Impaired  Orientation Level: Oriented to time;Oriented to person;Disoriented to place; Disoriented to situation (Disoriented to place initially, states \"hotel\" improves with improved alertness)  Cognition  Overall Cognitive Status: Exceptions  Arousal/Alertness: Delayed responses to stimuli  Following Commands: Inconsistently follows commands  Attention Span: Attends with cues to redirect  Memory: Decreased recall of recent events;Decreased short term memory  Safety Judgement: Decreased awareness of need for assistance;Decreased awareness of need for safety  Insights: Decreased awareness of deficits     Objective   Pulse: 72  Heart Rate Source: Monitor  BP: 126/63  Patient Position: Semi fowlers  MAP (Calculated): 84  Resp: 16  SpO2: 97 %  O2 Device: None (Room air)        Gross Assessment  AROM: Within functional limits  PROM: Within functional limits  Strength: Generally decreased, functional  Coordination: Within functional limits  Tone: Normal  Sensation: Intact     Bed Mobility Training  Bed Mobility Training: Yes  Interventions: Verbal cues; Tactile cues; Safety awareness training  Supine to Sit: Moderate assistance;Assist X2;Additional time; Adaptive equipment  Sit to Supine: Minimum assistance; Additional time; Adaptive equipment  Scooting: Minimum assistance  Balance  Sitting: Impaired (CGA-min(A))  Standing: Impaired  Transfer Training  Transfer Training: Yes (Pt refuses standing; pt performs partial STS from EOB to scoot to [facility])       AM-PAC Score  AM-PAC Inpatient Mobility Raw Score : 9 (05/20/22 1615)  AM-PAC Inpatient T-Scale Score : 30.55 (05/20/22 1615)  Mobility Inpatient CMS 0-100% Score: 81.38 (05/20/22 1615)  Mobility Inpatient CMS G-Code Modifier : CM (05/20/22 1615)          Goals  Long Term Goals  Time Frame for Long term goals : 7 days (5/27/22) unless otherwise stated  Long term goal 1: Pt will perform bed mobility with supervision  Long term goal 2: Pt will perform transfer with LRAD and supervision  Long term goal 3: Pt will ambulate 50 ft with LRAD and supervision  Long term goal 4: Pt will perform 5 steps with HR and CGA  Long term goal 5: Pt will perfrom 12-15 reps of BLE exercises to improve strength and mobility by 5/23  Patient Goals   Patient goals : \"to go home\"       Education  Patient Education  Education Given To: Patient; Family  Education Provided: Role of Therapy;Plan of Care  Education Provided Comments: Pt educated on importance of continued mobility, risk of bed rest and deconditioning. will require reinforcement  Education Method: Demonstration;Verbal  Barriers to Learning: None  Education Outcome: Verbalized understanding;Demonstrated understanding;Continued education needed      Therapy Time   Individual Concurrent Group Co-treatment   Time In 1118         Time Out 1138         Minutes 20         Timed Code Treatment Minutes: 10 Minutes (10 min eval)     If pt is unable to be seen after this session, please let this note serve as discharge summary. Please see case management note for discharge disposition. Thank you.     Gary Tsang, PT

## 2022-05-20 NOTE — TELEPHONE ENCOUNTER
Per Hardeep Drake RN, Please call the patient and schedule an appointment in the [facility] clinic. Thank You.

## 2022-05-20 NOTE — CARE COORDINATION
Patient had Catherine Myoview stress test pending. Awaiting imaging pending. NSTEMI holding off on cath for now since troponin elevated but stable. Getting 1 Unit of PRBC's. ESRD T-TH-SAT Radha Bourgeois. Wife usually transports. 1030 AM chair time. Patient is very weak per RN and PT/OT has been ordered and eval pending. Open to skilled services if needed. Current plan is to return home with spouse.

## 2022-05-20 NOTE — PROGRESS NOTES
Comprehensive Nutrition Assessment    Type and Reason for Visit:  Reassess    Nutrition Recommendations/Plan:   1. Recommend General diet order, free of therapeutic restrictions, to promote adequate nutrient intake  2. Add Nepro shakes BID  3. Encourage PO intakes  4. Monitor nutrition adequacy, pertinent labs, bowel habits, wt changes, and clinical progress     Malnutrition Assessment:  Malnutrition Status: At risk for malnutrition (Comment) (05/18/22 1219)    Context:  Acute Illness     Findings of the 6 clinical characteristics of malnutrition:  Energy Intake:  50% or less of estimated energy requirements for 5 or more days    Nutrition Assessment:    Follow up: Pt unavailable during multiple attempts today, SOFIA po intakes d/t no documentation per EMR. Pt NPO this AM for stress test, diet advanced to carb controlled. Recommend to liberalize to regular diet to promote PO intakes, will re-order Nepro shakes BID. Noted pt started on megace to stimulate appetite. Will continue to monitor. Nutrition Related Findings:    Na 130. K and Phos WNL. BM 5/18. Wound Type: None       Current Nutrition Intake & Therapies:    Average Meal Intake: Unable to assess  Average Supplements Intake: None Ordered  ADULT DIET; Regular; 5 carb choices (75 gm/meal)    Anthropometric Measures:  Height: 5' 10\" (177.8 cm)  Ideal Body Weight (IBW): 166 lbs (75 kg)       Current Body Weight: 187 lb (84.8 kg), 112.7 % IBW. Weight Source: Bed Scale  Current BMI (kg/m2): 26.8  Usual Body Weight: 200 lb (90.7 kg) (per pt)  % Weight Change (Calculated): -5                    BMI Categories: Overweight (BMI 25.0-29. 9)    Estimated Daily Nutrient Needs:  Energy Requirements Based On: Kcal/kg (25-28)  Weight Used for Energy Requirements: Current (49BE)  Energy (kcal/day): 8405-2166  Weight Used for Protein Requirements: Current (1-1.2)  Protein (g/day):   Method Used for Fluid Requirements: 1 ml/kcal  Fluid (ml/day): 3112-4466    Nutrition Diagnosis:   · Inadequate oral intake related to early satiety as evidenced by poor intake prior to admission,weight loss    Nutrition Interventions:   Food and/or Nutrient Delivery: Modify Current Diet,Start Oral Nutrition Supplement  Nutrition Education/Counseling: Education not appropriate  Coordination of Nutrition Care: Continue to monitor while inpatient  Plan of Care discussed with: Patient, wife    Goals:  Previous Goal Met: No Progress toward Goal(s)  Goals: PO intake 50% or greater,prior to discharge       Nutrition Monitoring and Evaluation:   Behavioral-Environmental Outcomes: None Identified  Food/Nutrient Intake Outcomes: Food and Nutrient Intake,Supplement Intake  Physical Signs/Symptoms Outcomes: Weight,Nutrition Focused Physical Findings,Biochemical Data    Discharge Planning:    Continue current [facility] [address]: [id number]

## 2022-05-20 NOTE — PROGRESS NOTES
Hospitalist Progress Note    CC: NSTEMI (non-ST elevated myocardial infarction) [facility])    Hospital course:  64yo WM with PMHX sig for  diet-controlled DM2, HTN, HLD, ESRD on dialysis started 3 weeks ago presented to the ED with complaints of generalized weakness. Patient reports he sees Dr. Willy Salazar with nephrology. He was started on dialysis about 3 weeks ago. Since then he has had increasing generalized weakness, decreased appetite. He finds it difficult to ambulate. Today morning he reports he was ambulating, went to dialysis finished his dialysis and after he came home he was very weak. He required assistance to get back into the car to come home. At home he reports he was extremely weak and slowly went down to the ground by his spouse and she could not get him back up on his feet again so she ended up calling EMS. He denies any shortness of breath or chest pain. No syncopal episodes reported. He reports he has not been eating much. Still makes urine. Denied any nausea vomiting or diarrhea, no abdominal pain. Denies any fevers or chills. He has a TDC in his right chest.  Patient reports he was about 200 pounds when he started dialysis, currently at 190 pounds. Stress test normal    Admit date: 5/17/2022  Days in hospital:  3    24 Hour Events: pt still feels weak    Subjective: stress test normal - no plans for cath at this time although he will need cath as part of aortic valvular workup - will have TAVR clinic as outpt - wife noticing some processing issues - no focal weakness noted    ROS:   A comprehensive review of systems was negative except for: weak tired, no chest pain  .     Objective:    /63   Pulse 72   Temp 97.9 °F (36.6 °C)   Resp 16   Ht 5' 10\" (1.778 m)   Wt 187 lb 6.3 oz (85 kg)   SpO2 97%   BMI 26.89 kg/m²     Gen: chronically ill appearing, pale  HEENT: NC/AT, moist mucous membranes, no oropharyngeal erythema or exudate  Neck: supple, trachea midline, no anterior cervical or SC LAD  Heart:  Normal s1/s2, RRR, no murmurs, gallops, or rubs. no leg edema  Lungs:  diminished bilaterally, no wheeze, no rales, no rhonchi, no crackles, no use of accessory muscles  Abd: bowel sounds present, soft, nontender, nondistended, no masses  Extrem:  No clubbing, cyanosis,  no edema  Skin: no rashes or lesions  Psych: A & O x3  Neuro: grossly intact, moves all four extremities. ECHO:  Summary    Normal left ventricular systolic function with ejection fraction of 55-60%.    No regional wall motion abnormalites are seen.   [place] dilated size left ventricle.    Grade I diastolic dysfunction with normal filling pressure.    Mild mitral regurgitation.    Moderate to severe aortic stenosis.    Systolic pulmonic artery pressure (SPAP) is normal estimated at 35 mmHg    (Right atrial pressure of 3 mmHg).    Compared to previous study from 5-4-2018, there are no changes noted in left    ventricular function. Assessment:    Principal Problem:    NSTEMI (non-ST elevated myocardial infarction) ([address]  Active Problems:    Weakness    Elevated troponin    Abnormal ECG    PVC (premature ventricular contraction)    Murmur    Anemia in other chronic diseases classified elsewhere    Type 2 diabetes mellitus with renal complication (HCC)    Essential hypertension    Hypothyroidism    Mixed hyperlipidemia    Tobacco use disorder    Hyponatremia    Crescentic glomerulonephritis    Anemia of chronic renal failure    Chronic kidney disease, stage 5 (HCC)  Resolved Problems:    * No resolved hospital problems. *      Plan:  1. NSTEMI -  - holding off on cardiac cath for now since troponin is elevated but stable and stress test normal - will likely need cath as outpatient especially if plans to move forward with aortic valve replacement - heparin will be stopped  2. ESRD on HD - for HD every TU, Th, Sat  3.   Anemia of CKD - due to increase in troponin - will get 1u pRBC - feels better after transfusion  4. HTN - controlled  5. DMII with ESRD - controlled  6. Hyponatremia - mild and improved  7 . Processing issues - will get speech therapy involved - no focal findings, but may need MRI to further elucidate - will have neuro to see    Can likely go home tomorrow    Prognosis:  Fair    Code status:  Full code    DVT prophylaxis: Heparin SQ  GI prophylaxis: Proton Pump Inhibitor  Antibiotic prophylaxis indicated:   no  Diet:  ADULT DIET;  Regular  ADULT ORAL NUTRITION SUPPLEMENT; Breakfast, Dinner; Renal Oral Supplement    Disposition:  Home with home health    Medications:  Scheduled Meds:   heparin (porcine)  5,000 Units SubCUTAneous 3 times per day    epoetin katelynn-epbx  10,000 Units IntraVENous Once per day on Tue Thu Sat    megestrol  625 mg Oral Daily    aspirin  81 mg Oral Daily    atorvastatin  20 mg Oral Daily    calcium elemental  500 mg Oral Daily    carvedilol  3.125 mg Oral BID WC    doxazosin  1 mg Oral Daily    levothyroxine  137 mcg Oral Daily    amLODIPine  10 mg Oral Daily    sodium chloride flush  5-40 mL IntraVENous 2 times per day       PRN Meds:  heparin (porcine), sodium chloride flush, sodium chloride, ondansetron **OR** ondansetron, polyethylene glycol, acetaminophen **OR** acetaminophen, nitroGLYCERIN, sodium chloride    IV:   sodium chloride      sodium chloride           Intake/Output Summary (Last 24 hours) at 5/20/2022 1535  Last data filed at 5/19/2022 1802  Gross per 24 hour   Intake --   Output 50 ml   Net -50 ml       Results:  CBC:   Recent Labs     05/17/22  1828 05/18/22  0231 05/19/22  0427   WBC 15.8* 13.9* 15.0*   HGB 7.5* 7.1* 8.2*   HCT 23.1* 22.0* 25.2*   MCV 83.5 84.0 82.8    363 414     BMP:   Recent Labs     05/17/22  1553 05/18/22  0231 05/19/22  0427   * 131* 130*   K 3.1* 4.0 3.8   CL 91* 95* 95*   CO2 28 26 26   PHOS  --   --  2.5   BUN 11 15 25*   CREATININE 3.7* 4.7* 6.3*     Mag: No results for input(s): MAG in the last 72 hours. Phos:   Lab Results   Component Value Date    PHOS 2.5 05/19/2022     No results found for: GLU    LIVER PROFILE:   Recent Labs     05/17/22  1553   AST 27   ALT 17   LIPASE 12.0*   BILITOT 0.5   ALKPHOS 402*     PT/INR: No results for input(s): PROTIME, INR in the last 72 hours.   APTT:   Recent Labs     05/17/22  1828   APTT 24.0*     UA:  Recent Labs     05/19/22  1800   COLORU Yellow   PHUR 7.5   WBCUA 3-5   RBCUA 3-4   MUCUS Rare*   CLARITYU Clear   SPECGRAV 1.010   LEUKOCYTESUR Negative   UROBILINOGEN 0.2   BILIRUBINUR Negative   BLOODU SMALL*   GLUCOSEU Negative   AMORPHOUS Rare       Invalid input(s): ABG  Lab Results   Component Value Date    CALCIUM 7.9 (L) 05/19/2022    PHOS 2.5 05/19/2022               Electronically signed by Ita Bonilla MD on 5/20/2022 at 3:35 PM

## 2022-05-20 NOTE — PROGRESS NOTES
A Catherine Myoview stress test was completed on this patient as ordered. The patient tolerated the procedure well. Awaiting stress imaging at this time.

## 2022-05-20 NOTE — PROGRESS NOTES
[address]  Cardiology  Progress Note    Admission date:  2022    Reason for follow up visit: Elevated troponin, AS    HPI/CC: Mail Bojorquez is a 66 y.o. male who was admitted 2022 for weakness. Troponin elevated. Echo showed EF 55-60%, moderate-severe AS. Stress test normal. Rhythm has been sinus. Subjective: Appears very fatigued and weak. No chest pain or shortness of breath. Vitals:  Blood pressure 133/79, pulse 65, temperature 97.7 °F (36.5 °C), temperature source Oral, resp. rate 16, height 5' 10\" (1.778 m), weight 187 lb 6.3 oz (85 kg), SpO2 97 %.   Temp  Av.1 °F (36.7 °C)  Min: 97.7 °F (36.5 °C)  Max: 98.5 °F (36.9 °C)  Pulse  Av.8  Min: 65  Max: 78  BP  Min: 112/58  Max: 136/67  SpO2  Av.6 %  Min: 93 %  Max: 97 %    24 hour I/O    Intake/Output Summary (Last 24 hours) at 2022 1405  Last data filed at 2022 1802  Gross per 24 hour   Intake --   Output 50 ml   Net -50 ml     Current Facility-Administered Medications   Medication Dose Route Frequency Provider Last Rate Last Admin    heparin (porcine) injection 3,700 Units  3,700 Units IntraCATHeter PRN Rebekah Mueller MD        heparin (porcine) injection 5,000 Units  5,000 Units SubCUTAneous 3 times per day Cherry Schultz MD   5,000 Units at 22 0622    epoetin katelynn-epbx (RETACRIT) injection 10,000 Units  10,000 Units IntraVENous Once per day on Tue Thu Sat Rebekah Mueller MD   10,000 Units at 22 1045    megestrol (MEGACE) 40 MG/ML suspension 625 mg  625 mg Oral Daily Rebekah Mueller MD   625 mg at 22 1035    aspirin chewable tablet 81 mg  81 mg Oral Daily Felton Aguilera MD   81 mg at 22 1035    atorvastatin (LIPITOR) tablet 20 mg  20 mg Oral Daily Felton Aguilera MD   20 mg at 22 1035    calcium elemental (OSCAL) tablet 500 mg  500 mg Oral Daily Felton Aguilera MD   500 mg at 22 1035    carvedilol (COREG) tablet 3.125 mg  3.125 mg Oral BID  Junior Aliya St MD   3.125 mg at 05/20/22 1047    doxazosin (CARDURA) tablet 1 mg  1 mg Oral Daily Aníbal MD Sergei   1 mg at 05/20/22 1035    levothyroxine (SYNTHROID) tablet 137 mcg  137 mcg Oral Daily Aníbal MD Sergei   137 mcg at 05/20/22 0622    amLODIPine (NORVASC) tablet 10 mg  10 mg Oral Daily Aníbal MD Sergei   10 mg at 05/20/22 1035    sodium chloride flush 0.9 % injection 5-40 mL  5-40 mL IntraVENous 2 times per day Aníbal MD Sergei   10 mL at 05/20/22 1302    sodium chloride flush 0.9 % injection 5-40 mL  5-40 mL IntraVENous PRN Aníbal MD Sergei        0.9 % sodium chloride infusion   IntraVENous PRN Aníbal MD Sergei        ondansetron (ZOFRAN-ODT) disintegrating tablet 4 mg  4 mg Oral Q8H PRN Aníbal MD Sergei        Or    ondansetron (ZOFRAN) injection 4 mg  4 mg IntraVENous Q6H PRN Aníbal Nails, MD        polyethylene glycol (GLYCOLAX) packet 17 g  17 g Oral Daily PRN Aníbal MD Sergei        acetaminophen (TYLENOL) tablet 650 mg  650 mg Oral Q6H PRN Aníbal MD Sergei        Or    acetaminophen (TYLENOL) suppository 650 mg  650 mg Rectal Q6H PRN Aníbal MD Sergei        nitroGLYCERIN (NITROSTAT) SL tablet 0.4 mg  0.4 mg SubLINGual Q5 Min PRN Aníbal Mai MD        0.9 % sodium chloride infusion   IntraVENous PRN Jen Read MD         Review of Systems   Constitutional: Positive for fatigue. Respiratory: Negative. Cardiovascular: Negative. Gastrointestinal: Negative. Neurological: Positive for weakness.      Objective:     Telemetry monitor: SR    Physical Exam:  Constitutional:  Alert, NAD, appears older than stated age, +chronically ill and pale  Eyes: PERRL, sclera nonicteric  Neck:  Supple, no masses, no thyroidmegaly, no JVD  Skin:  Warm and dry; no rash or lesions  Heart: Regular, normal apex, S1 and S2 normal, no M/G/R  Lungs:  Normal respiratory effort; clear; no wheezing/rhonchi/rales  Abdomen: soft, non tender, + bowel sounds  Extremities:  No edema or cyanosis; no clubbing  Neuro: alert and oriented, moves legs and arms equally, normal mood and affect    Data Reviewed:    Stress test 5/20/2022: There is normal isotope uptake at stress and rest. There is no evidence of    myocardial ischemia or scar. Normal myocardial thickening and wall motion.    Borderline cardiac size by volumes. Normal LV function with Post-stress LVEF    of 57%. Echo 5/2022:  Normal left ventricular systolic function with ejection fraction of 55-60%. No regional wall motion abnormalites are seen. Mildly dilated size left ventricle. Grade I diastolic dysfunction with normal filling pressure. Mild mitral regurgitation. Moderate to severe aortic stenosis. Systolic pulmonic artery pressure (SPAP) is normal estimated at 35 mmHg   (Right atrial pressure of 3 mmHg). Compared to previous study from 5-4-2018, there are no changes noted in left   ventricular function.     Lab Reviewed:     Renal Profile:  Lab Results   Component Value Date    CREATININE 6.3 05/19/2022    BUN 25 05/19/2022     05/19/2022    K 3.8 05/19/2022    K 4.0 05/18/2022    CL 95 05/19/2022    CO2 26 05/19/2022     CBC:    Lab Results   Component Value Date    WBC 15.0 05/19/2022    RBC 3.04 05/19/2022    HGB 8.2 05/19/2022    HCT 25.2 05/19/2022    MCV 82.8 05/19/2022    RDW 14.8 05/19/2022     05/19/2022     BNP:    Lab Results   Component Value Date    PROBNP 478 02/16/2018     Fasting Lipid Panel:    Lab Results   Component Value Date    CHOL 72 05/20/2022    HDL 22 05/20/2022    TRIG 94 05/20/2022     Cardiac Enzymes:  CK/MbTroponin  Lab Results   Component Value Date    CKTOTAL 23 03/31/2018    TROPONINI 0.54 05/18/2022     PT/ INR   Lab Results   Component Value Date    INR 1.21 04/04/2018    INR 1.23 04/03/2018    INR 1.23 02/16/2018    PROTIME 13.7 04/04/2018    PROTIME 13.9 04/03/2018    PROTIME 13.9 02/16/2018     PTT No results found for: PTT   Lab Results   Component Value Date    MG 1.90 05/17/2022      Lab Results   Component Value Date    TSH 26.35 02/17/2018     All labs and imaging reviewed today    Assessment:  Elevated troponin: peak 0.55; no signs of ACS  Normal stress test today  Aortic stenosis: moderate-severe  HTN: stable  Anemia: s/p PRBCs 5/19/2022  DM  ESRD  History of pericarditis    Plan:   1. Tried to engage patient in conversation regarding AS. Still appears very fatigued. Will arrange for TAVR clinic as outpatient for further evaluation. 2. Continue aspirin, statin, carvedilol, amlodipine  3. Cardiology will sign off, please call if needed. Follow up will be arranged.      VIVIEN Cordero-CNP  [address]  [phone]

## 2022-05-20 NOTE — PROGRESS NOTES
Occupational Therapy   Facility/Department: [facility] C4 PCU  Occupational Therapy Initial Assessment    Name: Amanda Oneill  : 1950  MRN: [id number]  Date of Service: 2022    Discharge Recommendations:  Subacute/Skilled Nursing Facility  OT Equipment Recommendations  Other: defer       Patient Diagnosis(es): The primary encounter diagnosis was NSTEMI (non-ST elevated myocardial infarction) ([facility] [address] Diagnoses of Generalized weakness, Anemia, unspecified type, and Hyponatremia were also pertinent to this visit. Past Medical History:  has a past medical history of Anemia, Diabetes mellitus ([facility] [address] ESRD (end stage renal disease) on dialysis ([facility] [address] Hyperlipidemia, and Hypertension. Past Surgical History:  has no past surgical history on file. Assessment   Performance deficits / Impairments: Decreased functional mobility ; Decreased balance;Decreased coordination;Decreased ADL status; Decreased high-level IADLs;Decreased endurance;Decreased strength;Decreased safe awareness;Decreased cognition  Assessment: Pt agreeable with encouragement however req consistent encouragement and demo decreased motivation/cognition. Pt oriented to self-only and req Mod A x2 for bed mobility and refused to stand this day. Pt functioning below baseline and wife reports decreased functional mobility over the past 3 weeks from when the pt started dialysis.  recommend d/c to SNF  Prognosis: Fair  Decision Making: Medium Complexity  REQUIRES OT FOLLOW-UP: Yes  Activity Tolerance  Activity Tolerance: Treatment limited secondary to agitation;Patient limited by fatigue  Activity Tolerance Comments: BP:133/79, HR: 65, O2: 97%        Plan   Plan  Times per Week: 2-3x/wk  Current Treatment Recommendations: Strengthening,Functional mobility training,Endurance training,Safety education & training,Patient/Caregiver education & training,Self-Care / ADL,Home management training Restrictions  Restrictions/Precautions  Restrictions/Precautions: General Precautions,NPO,Bed Alarm  Position Activity Restriction  Other position/activity restrictions: TDC, NPO pending stress test, IV    Subjective   General  Chart Reviewed: Yes  Patient assessed for rehabilitation services?: Yes  Family / Caregiver Present: Yes (wife)  Referring Practitioner: Christie King MD  Diagnosis: NSTEMI  Subjective  Subjective: Pt sleeping on arrival and lethargic throughout session however agreeable to therapy  General Comment  Comments: RN approved     Social/Functional History  Social/Functional History  Lives With: Spouse  Type of Home: House  Home Layout: One level  Home Access: Stairs to enter with rails  Entrance Stairs - Number of Steps: 5 BALTAZAR  Bathroom Shower/Tub: Walk-in shower  Bathroom Toilet: Handicap height (pt uses sink to assist in standing)  Bathroom Equipment: Grab bars in shower (pt performs sponge baths)  Home Equipment: Rollator,Wheelchair-manual (uses walker always)  Has the patient had two or more falls in the past year or any fall with injury in the past year?: No  Receives Help From: Family  ADL Assistance: Needs assistance (Pts wife has needed to assist in the past 3 weeks)  Homemaking Responsibilities: No  Ambulation Assistance: Independent  Transfer Assistance: Independent  Active : No  Occupation: Retired  Additional Comments: Pt with poor participation throughout session       Objective   Pulse: 65  Heart Rate Source: Monitor  BP: 133/79  MAP (Calculated): 97  Resp: 16  SpO2: 97 %  O2 Device: None (Room air)  Vision Exceptions: Wears glasses at all times  Hearing: Within functional limits       Observation/Palpation  Posture: Poor  Body Mechanics: poor forward flexed posture seated with neck flexed at almost 90*  Safety Devices  Type of Devices: Call light within reach; Left in bed;Bed alarm in place;Nurse notified  Bed Mobility Training  Bed Mobility Training: Yes  Interventions: Verbal cues; Tactile cues; Safety awareness training  Supine to Sit: Moderate assistance;Assist X2;Additional time; Adaptive equipment  Sit to Supine: Minimum assistance; Additional time; Adaptive equipment  Scooting: Minimum assistance  Balance  Sitting: Impaired (CGA-min(A))  Standing: Impaired  Transfer Training  Transfer Training: Yes (Pt refuses standing; pt performs partial STS from EOB to scoot to [facility])     AROM: Grossly decreased, non-functional  PROM: Within functional limits  Strength: Generally decreased, functional  Coordination: Within functional limits  Tone: Normal  Sensation: Intact  ADL  Feeding: NPO  Grooming: Minimal assistance;Verbal cueing; Increased time to complete  LE Dressing: Maximum assistance  Toileting: Maximum assistance     Activity Tolerance  Activity Tolerance: Patient tolerated evaluation without incident;Patient limited by fatigue           Cognition  Overall Cognitive Status: Exceptions  Arousal/Alertness: Delayed responses to stimuli  Following Commands: Inconsistently follows commands  Attention Span: Attends with cues to redirect  Memory: Decreased recall of recent events;Decreased short term memory  Safety Judgement: Decreased awareness of need for assistance;Decreased awareness of need for safety  Insights: Decreased awareness of deficits                  Education Given To: Patient  Education Provided: Role of Therapy;Plan of Care;Orientation; ADL Adaptive Strategies; Family Education; Energy Conservation;Transfer Training  Education Method: Demonstration;Verbal  Barriers to Learning: Cognition  Education Outcome: Continued education needed; Unable to verbalize                AM-PAC Score        AM-[facility] Inpatient Daily Activity Raw Score: 14 (05/20/22 1357)  AM-PAC Inpatient ADL T-Scale Score : 33.39 (05/20/22 1357)  ADL Inpatient CMS 0-100% Score: 59.67 (05/20/22 1357)  ADL Inpatient CMS G-Code Modifier : CK (05/20/22 1357)    Goals  Short Term Goals  Time Frame for Short term goals: 1wk (5/27/22)  Short Term Goal 1: Pt will complete LE dressing with SBA  Short Term Goal 2: Pt will complete functional t/fs with CGA  Short Term Goal 3: Pt will complete 1-2 grooming tasks in stance at sink with SBA by 5/24  Short Term Goal 4: Pt will complete 5x15-20 reps BUE therex for increase in functional strength  Patient Goals   Patient goals : pt unwilling to state at this time       Therapy Time   Individual Concurrent Group Co-treatment   Time In 1118         Time Out 1138         Minutes 20         Timed Code Treatment Minutes: 10 Minutes (10 minute eval)       Lori Wells, OT   If pt is unable to be seen after this session, please let this note serve as discharge summary. Please see case management note for discharge disposition. Thank you.

## 2022-05-21 LAB
ALBUMIN SERPL-MCNC: 2 G/DL (ref 3.4–5)
ANION GAP SERPL CALCULATED.3IONS-SCNC: 9 MMOL/L (ref 3–16)
BASOPHILS ABSOLUTE: 0.1 K/UL (ref 0–0.2)
BASOPHILS RELATIVE PERCENT: 0.7 %
BUN BLDV-MCNC: 30 MG/DL (ref 7–20)
CALCIUM SERPL-MCNC: 8.4 MG/DL (ref 8.3–10.6)
CHLORIDE BLD-SCNC: 96 MMOL/L (ref 99–110)
CO2: 27 MMOL/L (ref 21–32)
CREAT SERPL-MCNC: 6.6 MG/DL (ref 0.8–1.3)
EOSINOPHILS ABSOLUTE: 0.6 K/UL (ref 0–0.6)
EOSINOPHILS RELATIVE PERCENT: 4.3 %
GFR AFRICAN AMERICAN: 10
GFR NON-AFRICAN AMERICAN: 8
GLUCOSE BLD-MCNC: 126 MG/DL (ref 70–99)
HCT VFR BLD CALC: 26.3 % (ref 40.5–52.5)
HEMOGLOBIN: 8.6 G/DL (ref 13.5–17.5)
LYMPHOCYTES ABSOLUTE: 1 K/UL (ref 1–5.1)
LYMPHOCYTES RELATIVE PERCENT: 8.1 %
MCH RBC QN AUTO: 27.4 PG (ref 26–34)
MCHC RBC AUTO-ENTMCNC: 32.7 G/DL (ref 31–36)
MCV RBC AUTO: 83.8 FL (ref 80–100)
MONOCYTES ABSOLUTE: 0.8 K/UL (ref 0–1.3)
MONOCYTES RELATIVE PERCENT: 6.5 %
NEUTROPHILS ABSOLUTE: 10.2 K/UL (ref 1.7–7.7)
NEUTROPHILS RELATIVE PERCENT: 80.4 %
PDW BLD-RTO: 15.1 % (ref 12.4–15.4)
PHOSPHORUS: 2.3 MG/DL (ref 2.5–4.9)
PLATELET # BLD: 387 K/UL (ref 135–450)
PMV BLD AUTO: 7.2 FL (ref 5–10.5)
POTASSIUM SERPL-SCNC: 3.5 MMOL/L (ref 3.5–5.1)
RBC # BLD: 3.14 M/UL (ref 4.2–5.9)
SODIUM BLD-SCNC: 132 MMOL/L (ref 136–145)
WBC # BLD: 12.7 K/UL (ref 4–11)

## 2022-05-21 PROCEDURE — 84443 ASSAY THYROID STIM HORMONE: CPT

## 2022-05-21 PROCEDURE — 80069 RENAL FUNCTION PANEL: CPT

## 2022-05-21 PROCEDURE — 6370000000 HC RX 637 (ALT 250 FOR IP): Performed by: INTERNAL MEDICINE

## 2022-05-21 PROCEDURE — 85025 COMPLETE CBC W/AUTO DIFF WBC: CPT

## 2022-05-21 PROCEDURE — 6360000002 HC RX W HCPCS: Performed by: INTERNAL MEDICINE

## 2022-05-21 PROCEDURE — 2060000000 HC ICU INTERMEDIATE R&B

## 2022-05-21 PROCEDURE — 36415 COLL VENOUS BLD VENIPUNCTURE: CPT

## 2022-05-21 PROCEDURE — 83036 HEMOGLOBIN GLYCOSYLATED A1C: CPT

## 2022-05-21 PROCEDURE — 2580000003 HC RX 258: Performed by: INTERNAL MEDICINE

## 2022-05-21 PROCEDURE — 99223 1ST HOSP IP/OBS HIGH 75: CPT | Performed by: NURSE PRACTITIONER

## 2022-05-21 PROCEDURE — 90935 HEMODIALYSIS ONE EVALUATION: CPT

## 2022-05-21 RX ORDER — HEPARIN SODIUM 1000 [USP'U]/ML
INJECTION, SOLUTION INTRAVENOUS; SUBCUTANEOUS
Status: DISPENSED
Start: 2022-05-21 | End: 2022-05-21

## 2022-05-21 RX ADMIN — DOXAZOSIN 1 MG: 2 TABLET ORAL at 07:57

## 2022-05-21 RX ADMIN — SODIUM CHLORIDE, PRESERVATIVE FREE 10 ML: 5 INJECTION INTRAVENOUS at 21:14

## 2022-05-21 RX ADMIN — HEPARIN SODIUM 5000 UNITS: 5000 INJECTION INTRAVENOUS; SUBCUTANEOUS at 05:44

## 2022-05-21 RX ADMIN — ASPIRIN 81 MG 81 MG: 81 TABLET ORAL at 07:57

## 2022-05-21 RX ADMIN — CARVEDILOL 3.12 MG: 3.12 TABLET, FILM COATED ORAL at 16:05

## 2022-05-21 RX ADMIN — EPOETIN ALFA-EPBX 10000 UNITS: 10000 INJECTION, SOLUTION INTRAVENOUS; SUBCUTANEOUS at 08:39

## 2022-05-21 RX ADMIN — CARVEDILOL 3.12 MG: 3.12 TABLET, FILM COATED ORAL at 07:57

## 2022-05-21 RX ADMIN — SODIUM CHLORIDE, PRESERVATIVE FREE 10 ML: 5 INJECTION INTRAVENOUS at 07:57

## 2022-05-21 RX ADMIN — ATORVASTATIN CALCIUM 20 MG: 10 TABLET, FILM COATED ORAL at 07:57

## 2022-05-21 RX ADMIN — HEPARIN SODIUM 5000 UNITS: 5000 INJECTION INTRAVENOUS; SUBCUTANEOUS at 21:14

## 2022-05-21 RX ADMIN — AMLODIPINE BESYLATE 10 MG: 5 TABLET ORAL at 07:57

## 2022-05-21 RX ADMIN — LEVOTHYROXINE SODIUM 137 MCG: 0.03 TABLET ORAL at 05:44

## 2022-05-21 RX ADMIN — MEGESTROL ACETATE 625 MG: 40 SUSPENSION ORAL at 07:59

## 2022-05-21 RX ADMIN — CALCIUM 500 MG: 500 TABLET ORAL at 07:57

## 2022-05-21 ASSESSMENT — PAIN SCALES - GENERAL: PAINLEVEL_OUTOF10: 0

## 2022-05-21 NOTE — FLOWSHEET NOTE
05/20/22 2015   Assessment   Charting Type Shift assessment   Psychosocial   Psychosocial (WDL) WDL   Family Behaviors Calm; Cooperative   Neurological   Level of Consciousness Alert (0)   Orientation Level Oriented to person;Oriented to place; Disoriented to situation   Cognition Appropriate attention/concentration; Follows commands   Speech Clear   R Pupil Size (mm) 3   R Pupil Shape Round   R Pupil Reaction Brisk   L Pupil Size (mm) 3   L Pupil Shape Round   L Pupil Reaction Brisk   R Hand  Strong   L Hand  Strong   R Foot Dorsiflexion Strong   L Foot Dorsiflexion Strong   R Foot Plantar Flexion Strong   L Foot Plantar Flexion Strong   RUE Motor Response Normal extension;Normal flexion; Responds to command   RUE Sensation  Full sensation   LUE Motor Response Normal extension;Normal flexion; Responds to command   LUE Sensation  Full sensation   RLE Motor Response Normal extension;Normal flexion; Responds to command   RLE Sensation  Full sensation   LLE Motor Response Responds to command   LLE Sensation  Full sensation   Tongue Deviation None   Gag Present   Cough Reflex Present   NIHSS Stroke Scale Assessed No   Swallow Screening   Is the patient unable to remain alert for testing? No   Is the patient on a modified diet (thickened liquids) due to pre-existing dysphagia? No   Is there presence of existing enteral tube feeding via the stomach or nose? No   Is there presence of head-of-bed restrictions (less than 30 degrees)? No   Is there presence of tracheotomy tube? No   Is the patient ordered nothing-by-mouth status? No   3 oz Water Swallow Screen Pass   Bailey Coma Scale   Eye Opening 4   Best Verbal Response 5   Best Motor Response 6   Eugenio Coma Scale Score 15   HEENT (Head, Ears, Eyes, Nose, & Throat)   HEENT (WDL) WDL   Right Eye Glasses; Impaired vision   Left Eye Glasses; Impaired vision   Teeth Dentures upper   Respiratory   Respiratory (WDL) WDL   Respiratory Interventions H.O.B. elevated Respiratory Pattern Regular   Respiratory Depth Normal   Respiratory Quality/Effort Unlabored   Chest Assessment Chest expansion symmetrical   Breath Sounds   Right Upper Lobe Clear   Right Middle Lobe Clear   Right Lower Lobe Diminished   Left Upper Lobe Clear   Left Lower Lobe Diminished   Cardiac   Cardiac Regularity Regular   Heart Sounds S1, S2   Cardiac Rhythm Sinus rhythm  (on tele monitor)   Rhythm Interpretation   Pulse 65   Cardiac Monitor   Cardiac/Telemetry Monitor On Portable telemetry pack applied   Gastrointestinal   Abdomen Inspection Rounded; Soft   Last BM (including prior to admit) 05/20/22  (Per RN report)   RUQ Bowel Sounds Active   LUQ Bowel Sounds Active   RLQ Bowel Sounds Active   LLQ Bowel Sounds Active   Tenderness Soft;Nontender   Genitourinary   Genitourinary (WDL)   (Pt is on HD tx. TTH.S. Anuric per report and per Pt.)   Suprapubic Tenderness No   Dysuria (Pain/Burning w/Urination) No   Difficulty Urinating/Starting Stream SOFIA   Perineal Sensation   Perineal Sensation Intact   Peripheral Vascular   Peripheral Vascular (WDL) WDL   Edema None   Skin Integumentary    Skin Integrity Ecchymosis   Location scattered   Skin Color Pink   Skin Condition/Temp Dry; Warm   Skin Fold Management No   Dressing Present  No   Skin Assessed Underneath Dressing This Shift Yes  (skin pink, blanchable, skin intact)   Care Plan - Skin/Tissue Integrity Goals   Skin Integrity Remains Intact Monitor for areas of redness and/or skin breakdown   Musculoskeletal   RUE Weakness   LUE Weakness   RL Extremity Weakness   LL Extremity Weakness   Care Plan - Chronic Conditions and Co-Morbidity   Care Plan - Patient's Chronic Conditions and Co-Morbidity Symptoms are Monitored and Maintained or Improved Monitor and assess patient's chronic conditions and comorbid symptoms for stability, deterioration, or improvement;Collaborate with multidisciplinary team to address chronic and comorbid conditions and prevent exacerbation or deterioration   Care Plan - Discharge Planning Goals   Discharge to home or other facility with appropriate resources Identify barriers to discharge with patient and caregiver

## 2022-05-21 NOTE — FLOWSHEET NOTE
Treatment time: 3 hours  Net UF: 200 ml     Pre weight: 84 kg   Post weight: 84 kg  EDW: 85 kg     Access used: RCW CVC  Access function: Good with  ml/min     Medications or blood products given: Retacrit     Regular outpatient schedule: Gigi Lynch, LINSEY     Summary of response to treatment: Tolerated tx well.  No HD related complaints or complications.      Copy of dialysis treatment record placed in chart, to be scanned into EMR.       05/21/22 0842 05/21/22 1157   Treatment   Time On  --  0842   Time Off  --  1157   Vital Signs   BP (!) 94/48 (!) 114/57   Temp 97.8 °F (36.6 °C) 97.8 °F (36.6 °C)   Pulse 65 69   Resp 18 18   SpO2 95 % 95 %   Dialysis Bath   K+ (Potassium) 3  --    Ca+ (Calcium) 2.5  --    Na+ (Sodium) 138  --    HCO3 (Bicarb) 34  --

## 2022-05-21 NOTE — PROGRESS NOTES
Pt resting quietly in bed. Appears comfortable. No c/o pain. Denies any needs a this time. Safety/fall prevention matained. Personal belongings and call light within reach.  EFRAÍNRN

## 2022-05-21 NOTE — PROGRESS NOTES
Hospitalist Progress Note      PCP: Dena Cage MD    Date of Admission: 5/17/2022    Chief Complaint: NSTEMI (non-ST elevated myocardial infarction) [facility])    Hospital Course: 64yo WM with PMHX sig for  diet-controlled DM2, HTN, HLD, ESRD on dialysis started 3 weeks ago presented to the ED with complaints of generalized weakness.  Patient reports he sees Dr. Sathya Prince with nephrology.  He was started on dialysis about 3 weeks ago.  Since then he has had increasing generalized weakness, decreased appetite.  He finds it difficult to ambulate.  Today morning he reports he was ambulating, went to dialysis finished his dialysis and after he came home he was very weak.  He required assistance to get back into the car to come home.  At home he reports he was extremely weak and slowly went down to the ground by his spouse and she could not get him back up on his feet again so she ended up calling EMS.   He denies any shortness of breath or chest pain.  No syncopal episodes reported.  He reports he has not been eating much.  Still makes urine.  Denied any nausea vomiting or diarrhea, no abdominal pain.  Denies any fevers or chills.  He has a TDC in his right chest.  Patient reports he was about 200 pounds when he started dialysis, currently at 190 pounds. Stress test normal         Subjective: Seen in hemodialysis unit denies any chest pain or shortness of breath no nausea vomiting.       Medications:  Reviewed    Infusion Medications    sodium chloride      sodium chloride       Scheduled Medications    heparin (porcine)  5,000 Units SubCUTAneous 3 times per day    epoetin katelynn-epbx  10,000 Units IntraVENous Once per day on Tue Thu Sat    megestrol  625 mg Oral Daily    aspirin  81 mg Oral Daily    atorvastatin  20 mg Oral Daily    calcium elemental  500 mg Oral Daily    carvedilol  3.125 mg Oral BID WC    doxazosin  1 mg Oral Daily    levothyroxine  137 mcg Oral Daily    amLODIPine  10 mg Oral Daily    sodium chloride flush  5-40 mL IntraVENous 2 times per day     PRN Meds: heparin (porcine), sodium chloride flush, sodium chloride, ondansetron **OR** ondansetron, polyethylene glycol, acetaminophen **OR** acetaminophen, nitroGLYCERIN, sodium chloride      Intake/Output Summary (Last 24 hours) at 5/21/2022 0914  Last data filed at 5/21/2022 0548  Gross per 24 hour   Intake 170 ml   Output 0 ml   Net 170 ml       Physical Exam Performed:    BP (!) 94/48   Pulse 65   Temp 97.8 °F (36.6 °C)   Resp 18   Ht 5' 10\" (1.778 m)   Wt 185 lb 3 oz (84 kg)   SpO2 95%   BMI 26.57 kg/m²     General appearance: No apparent distress, appears stated age and cooperative. HEENT: Pupils equal, round, and reactive to light. Conjunctivae/corneas clear. Neck: Supple, with full range of motion. No jugular venous distention. Trachea midline. Respiratory:  Normal respiratory effort. Clear to auscultation, bilaterally without Rales/Wheezes/Rhonchi. Cardiovascular: Regular rate and rhythm with normal S1/S2 without murmurs, rubs or gallops. Abdomen: Soft, non-tender, non-distended with normal bowel sounds. Musculoskeletal: No clubbing, cyanosis or edema bilaterally. Full range of motion without deformity. Skin: Skin color, texture, turgor normal.  No rashes or lesions. Neurologic:  Neurovascularly intact without any focal sensory/motor deficits.  Cranial nerves: II-XII intact, grossly non-focal.  Psychiatric: Alert and oriented, thought content appropriate, normal insight  Capillary Refill: Brisk,3 seconds, normal   Peripheral Pulses: +2 palpable, equal bilaterally       Labs:   Recent Labs     05/19/22 0427 05/21/22 0441   WBC 15.0* 12.7*   HGB 8.2* 8.6*   HCT 25.2* 26.3*    387     Recent Labs     05/19/22  0427 05/21/22 0441   * 132*   K 3.8 3.5   CL 95* 96*   CO2 26 27   BUN 25* 30*   CREATININE 6.3* 6.6*   CALCIUM 7.9* 8.4   PHOS 2.5 2.3*     No results for input(s): AST, ALT, BILIDIR, BILITOT, ALKPHOS in the last 72 hours. No results for input(s): INR in the last 72 hours. No results for input(s): Towana Ball in the last 72 hours. Urinalysis:      Lab Results   Component Value Date    NITRU Negative 05/19/2022    WBCUA 3-5 05/19/2022    BACTERIA Rare 05/25/2018    RBCUA 3-4 05/19/2022    BLOODU SMALL 05/19/2022    SPECGRAV 1.010 05/19/2022    GLUCOSEU Negative 05/19/2022       Radiology:  NM Cardiac Stress Test Nuclear Imaging   Final Result      CT HEAD WO CONTRAST   Final Result   No acute intracranial abnormality. Generalized cerebral atrophy and chronic small vessel white matter ischemic   changes. RECOMMENDATIONS:   Unavailable         XR CHEST PORTABLE   Final Result   No focal lung consolidation. Assessment/Plan:    Active Hospital Problems    Diagnosis     Abnormal ECG [R94.31]      Priority: Medium    PVC (premature ventricular contraction) [I49.3]      Priority: Medium    Murmur [R01.1]      Priority: Medium    Anemia in other chronic diseases classified elsewhere [D63.8]      Priority: Medium    Weakness [R53.1]      Priority: Medium    Elevated troponin [R77.8]      Priority: Medium    Chronic kidney disease, stage 5 (HCC) [N18.5]     NSTEMI (non-ST elevated myocardial infarction) ([facility] [address] [I21.4]     Anemia of chronic renal failure [N18.9, D63.1]     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 NSTEMI cardiology consulted currently cardiac cath on hold as troponin has been stabilized and stress test normal after cardiac catheter outpatient/elective plan to move forward with aortic valve replacement. 2.  End-stage renal disease on hemodialysis nephrology consulted and following.   3.  Anemia of chronic kidney disease stable given 1 pack RBC transfusion due to elevated troponin NSTEMI. 4.  Hypertension controlled continue with current medication. 5.  Diabetes mellitus type 2 continue with the sliding scale , check hemoglobin A1c.  6.  Hyponatremia improved. 7.  Cognitive deficit neurology consulted, neurology consult appreciated MRI of the brain pending    DVT Prophylaxis: Heparin subcu  Diet: ADULT DIET;  Regular  ADULT ORAL NUTRITION SUPPLEMENT; Breakfast, Dinner; Renal Oral Supplement  Code Status: Full Code    PT/OT Eval Status:     Jarvis Arceo MD

## 2022-05-21 NOTE — PROGRESS NOTES
Speech Language Pathology  Note      SLP completed chart review and arrived to unit to complete speech-language/ cognitive-linguistic eval, but pt is off floor at dialysis. SLP will re-attempt later this date, as schedule allows. Honey Moura, [id number]  Speech-Language Pathologist

## 2022-05-21 NOTE — FLOWSHEET NOTE
Treatment time: 3 hours  Net UF: 200 ml     Pre weight: 84kg   Post weight: 84 kg  EDW: 85kg     Access used: RCW CVC  Access function: Good with  ml/min     Medications or blood products given: Retacrit     Regular outpatient schedule: Gigi Lynch, LINSEY     Summary of response to treatment: Tolerated tx well.  No HD related complaints or complications.      Copy of dialysis treatment record placed in chart, to be scanned into EMR.       05/21/22 0842 05/21/22 1157   Treatment   Time On  --  0842   Time Off  --  1157   Vital Signs   BP (!) 94/48 (!) 114/57   Temp 97.8 °F (36.6 °C) 97.8 °F (36.6 °C)   Pulse 65 69   Resp 18 18   SpO2 95 % 95 %   Dialysis Bath   K+ (Potassium) 3  --    Ca+ (Calcium) 2.5  --    Na+ (Sodium) 138  --    HCO3 (Bicarb) 34  --

## 2022-05-21 NOTE — PROGRESS NOTES
Speech Language Pathology  Attempted Speech/Cognitive-Linguistic Eval      SLP received order for cognitive-linguistic evaluation and completed chart review. SLP arrived to pt's room this afternoon, as he was in HD this morning. Upon explaining purpose of visit, pt became very agitated and stated he \"wouldn't do that today. \" Pt reports that he will do evaluation tomorrow, Sunday. Honey Duran, [id number]  Speech-Language Pathologist

## 2022-05-21 NOTE — PROGRESS NOTES
[facility] Progress Note           Subjective/   66y.o. year old male who we are seeing in consultation for ESKD on HD. HPI:  HD today. No issues. Mckenzie Garcia 85 worked well. No new complaints     ROS:  States appetite slowly improving, no shortness of breath. Objective/   GEN:  Chronically ill, /61   Pulse 70   Temp 97.8 °F (36.6 °C) (Oral)   Resp 16   Ht 5' 10\" (1.778 m)   Wt 185 lb 3 oz (84 kg)   SpO2 97%   BMI 26.57 kg/m²   HEENT: non-icteric, no JVD  CV: S1, S2 without m/r/g; no LE edema  RESP: CTA B without w/r/r; breathing wnl  ABD: +bs, soft, nt, no hsm  SKIN: warm, no rashes  ACCESS: R IJ TDC c/d/i    Data/  Recent Labs     05/19/22  0427 05/21/22  0441   WBC 15.0* 12.7*   HGB 8.2* 8.6*   HCT 25.2* 26.3*   MCV 82.8 83.8    387     Recent Labs     05/19/22  0427 05/21/22  0441   * 132*   K 3.8 3.5   CL 95* 96*   CO2 26 27   GLUCOSE 136* 126*   PHOS 2.5 2.3*   BUN 25* 30*   CREATININE 6.3* 6.6*   LABGLOM 9* 8*   GFRAA 11* 10*       Assessment/     - End stage kidney disease on HD Tues-Thurs-Sat              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

## 2022-05-21 NOTE — CONSULTS
In patient Neurology consult        [facility]      MD Skinny Lindsey  1950    Date of Service: 2022    Referring Physician: Ricki Rucker MD      Reason for the consult and CC: Cognitive issues    HPI:   The patient is a 66 y.o. male, with a PMH of ESRD on HD, HTN, HLD, and DM2, who presented to [facility] with generalized weakness and decreased appetite. He was found to have hyponatremia and aortic stenosis. He had a cardiac stress test which was negative. We are consulted for reported memory issues since initiation on HD. There was no family present during my exam.  The patient himself denies any memory issues. He denies fever, chills, headache, dizziness, vision changes, dysarthria, dysphagia, tinnitus, focal weakness, and paraesthesias. He notes generalized weakness and fatigue after HD. Family history is non-contributory. Surgical history is non-contributory.     Past Medical History:   Diagnosis Date    Anemia     Diabetes mellitus ([facility] [address]     ESRD (end stage renal disease) on dialysis ([facility] [address]     Tues-Thurs-Sat    Hyperlipidemia     Hypertension      Social History     Tobacco Use    Smoking status: Former Smoker     Packs/day: 0.50     Types: Cigarettes     Quit date: 2018     Years since quittin.2    Smokeless tobacco: Never Used   Substance Use Topics    Alcohol use: No     Alcohol/week: 35.0 standard drinks     Types: 35 Cans of beer per week     Comment: last drink  \"Pt states he hasn't drank in four weeks\"    Drug use: No     No Known Allergies  Current Facility-Administered Medications   Medication Dose Route Frequency Provider Last Rate Last Admin    heparin (porcine) 1000 UNIT/ML injection             epoetin katelynn-epbx (RETACRIT) 71590 UNIT/ML injection             heparin (porcine) injection 3,700 Units  3,700 Units IntraCATHeter PRN Aby Phillips MD        heparin (porcine) injection 5,000 Units  5,000 Units SubCUTAneous 3 times per day Fely Ford MD   5,000 Units at 05/21/22 0544    epoetin katelynn-epbx (RETACRIT) injection 10,000 Units  10,000 Units IntraVENous Once per day on Tue Thu Sat Ger Zamora MD   10,000 Units at 05/21/22 0839    megestrol (MEGACE) 40 MG/ML suspension 625 mg  625 mg Oral Daily Ger Zamora MD   625 mg at 05/21/22 0759    aspirin chewable tablet 81 mg  81 mg Oral Daily Milka Foss MD   81 mg at 05/21/22 0757    atorvastatin (LIPITOR) tablet 20 mg  20 mg Oral Daily Milka Foss MD   20 mg at 05/21/22 0757    calcium elemental (OSCAL) tablet 500 mg  500 mg Oral Daily Milka Foss MD   500 mg at 05/21/22 0757    carvedilol (COREG) tablet 3.125 mg  3.125 mg Oral BID WC Milka Foss MD   3.125 mg at 05/21/22 0757    doxazosin (CARDURA) tablet 1 mg  1 mg Oral Daily Milka Foss MD   1 mg at 05/21/22 0757    levothyroxine (SYNTHROID) tablet 137 mcg  137 mcg Oral Daily Milka Foss MD   137 mcg at 05/21/22 0544    amLODIPine (NORVASC) tablet 10 mg  10 mg Oral Daily Milka Foss MD   10 mg at 05/21/22 0757    sodium chloride flush 0.9 % injection 5-40 mL  5-40 mL IntraVENous 2 times per day Milka Foss MD   10 mL at 05/21/22 0757    sodium chloride flush 0.9 % injection 5-40 mL  5-40 mL IntraVENous PRN Milka Foss MD        0.9 % sodium chloride infusion   IntraVENous PRN Milka Foss MD        ondansetron (ZOFRAN-ODT) disintegrating tablet 4 mg  4 mg Oral Q8H PRN Milka Foss MD        Or    ondansetron (ZOFRAN) injection 4 mg  4 mg IntraVENous Q6H PRN Milka Foss MD        polyethylene glycol (GLYCOLAX) packet 17 g  17 g Oral Daily PRN Milka Foss MD        acetaminophen (TYLENOL) tablet 650 mg  650 mg Oral Q6H PRN Milka Foss MD        Or    acetaminophen (TYLENOL) suppository 650 mg  650 mg Rectal Q6H PRN Milka Foss MD        nitroGLYCERIN (NITROSTAT) SL tablet 0.4 mg 0.4 mg SubLINGual Q5 Min PRN Arturo Pope MD        0.9 % sodium chloride infusion   IntraVENous PRN Katlyn Mars MD           ROS : A 10-14 system review of constitutional, cardiovascular, respiratory, eyes, musculoskeletal, endocrine, GI, ENT, skin, hematological, genitourinary, psychiatric and neurologic systems was obtained and updated today and is unremarkable except as mentioned in my HPI      Exam:     Constitutional:   Vitals:    05/21/22 0745 05/21/22 0842 05/21/22 1157 05/21/22 1229   BP: (!) 124/53 (!) 94/48 (!) 114/57 122/61   Pulse: 70 65 69 70   Resp: 18 18 18 16   Temp: 97.8 °F (36.6 °C) 97.8 °F (36.6 °C) 97.8 °F (36.6 °C) 97.8 °F (36.6 °C)   TempSrc: Oral   Oral   SpO2: 95% 95% 95% 97%   Weight:  185 lb 3 oz (84 kg) 185 lb 3 oz (84 kg)    Height:           General appearance and observation: Chronically ill appearing, in no acute distress. Seen on HD. Neck: supple  Cardiovascular: No lower leg edema with good pulsation. Mental Status:   Oriented to person, place, problem, and time. Memory: Good immediate recall. Intact remote memory  Normal attention span and concentration. Language: intact naming, repeating and fluency   Good fund of Knowledge. Aware of current events and vocabulary   Cranial Nerves:   II: Visual fields: Full. Pupils: equal, round, reactive to light  III,IV,VI: Extra Ocular Movements are intact. No nystagmus  V: Facial sensation is intact  VII: Facial strength and movements: intact and symmetric  VIII: Hearing: Intact  IX: Palate elevation is symmetric  XI: Shoulder shrug is intact  XII: Tongue movements are normal  Musculoskeletal: 5/5 in all 4 extremities. Tone: Normal tone. Reflexes: Symmetric 2+ in the arms and 2+ in the legs   Planters: flexor bilaterally. Coordination: no pronator drift, no dysmetria with FNF in upper extremities. Normal REM. Sensation: normal to all modalities in both arms and legs.   Gait/Posture: did not test gait - seen on HD.  Data:  LABS:   Lab Results   Component Value Date     05/21/2022    K 3.5 05/21/2022    K 4.0 05/18/2022    CL 96 05/21/2022    CO2 27 05/21/2022    BUN 30 05/21/2022    CREATININE 6.6 05/21/2022    GFRAA 10 05/21/2022    LABGLOM 8 05/21/2022    GLUCOSE 126 05/21/2022    PHOS 2.3 05/21/2022    MG 1.90 05/17/2022    CALCIUM 8.4 05/21/2022     Lab Results   Component Value Date    WBC 12.7 05/21/2022    RBC 3.14 05/21/2022    HGB 8.6 05/21/2022    HCT 26.3 05/21/2022    MCV 83.8 05/21/2022    RDW 15.1 05/21/2022     05/21/2022     Lab Results   Component Value Date    INR 1.21 (H) 04/04/2018    PROTIME 13.7 (H) 04/04/2018       Neuroimaging was independently reviewed by myself and discussed results with the patient and/or family  Reviewed notes from different physicians  Reviewed lab and blood testing    Impression:     Generalized weakness with impaired cognition (per wife) - does not seem like acute CVA, as no focal deficits. CT head negative. Will obtain MRI of brain given risk factors. ESRD on HD  HTN, controlled. HLD  DM2  AS    Recommendation:     MRI of brain ordered. Consider carotid duplex following MRI  Monitor on tele. Continue ASA, statin. Continue home BP meds. F/u A1c, TSH  Improved glycemic control. SSI coverage while inpatient. PT/OT/SLP  HD per nephrology. Thank you for referring such patient. If you have any questions regarding my consult note, please don't hesitate to call me. Adam Coe, LIZA    This dictation was generated by voice recognition computer software.  Although all attempts are made to edit the dictation for accuracy, there may be errors in the  transcription that are not intended

## 2022-05-21 NOTE — PROGRESS NOTES
Pt awake, no c/o pain/discomfort. Stated \" I'm Fine\" denies any needs at this time. Safety/fall prevention maintained. Personal belongings and call light within reach.  EFRAÍNRN

## 2022-05-22 ENCOUNTER — APPOINTMENT (OUTPATIENT)
Dept: MRI IMAGING | Age: 72
DRG: 280 | End: 2022-05-22
Payer: MEDICARE

## 2022-05-22 LAB
ANION GAP SERPL CALCULATED.3IONS-SCNC: 9 MMOL/L (ref 3–16)
BUN BLDV-MCNC: 21 MG/DL (ref 7–20)
CALCIUM SERPL-MCNC: 8.5 MG/DL (ref 8.3–10.6)
CHLORIDE BLD-SCNC: 102 MMOL/L (ref 99–110)
CO2: 27 MMOL/L (ref 21–32)
CREAT SERPL-MCNC: 5 MG/DL (ref 0.8–1.3)
ESTIMATED AVERAGE GLUCOSE: 122.6 MG/DL
GFR AFRICAN AMERICAN: 14
GFR NON-AFRICAN AMERICAN: 11
GLUCOSE BLD-MCNC: 112 MG/DL (ref 70–99)
HBA1C MFR BLD: 5.9 %
HCT VFR BLD CALC: 25.5 % (ref 40.5–52.5)
HEMOGLOBIN: 8.3 G/DL (ref 13.5–17.5)
MCH RBC QN AUTO: 27.4 PG (ref 26–34)
MCHC RBC AUTO-ENTMCNC: 32.5 G/DL (ref 31–36)
MCV RBC AUTO: 84.3 FL (ref 80–100)
PDW BLD-RTO: 15.4 % (ref 12.4–15.4)
PLATELET # BLD: 366 K/UL (ref 135–450)
PMV BLD AUTO: 7.1 FL (ref 5–10.5)
POTASSIUM REFLEX MAGNESIUM: 3.9 MMOL/L (ref 3.5–5.1)
RBC # BLD: 3.02 M/UL (ref 4.2–5.9)
SODIUM BLD-SCNC: 138 MMOL/L (ref 136–145)
TSH SERPL DL<=0.05 MIU/L-ACNC: 96.09 UIU/ML (ref 0.27–4.2)
WBC # BLD: 12 K/UL (ref 4–11)

## 2022-05-22 PROCEDURE — 92523 SPEECH SOUND LANG COMPREHEN: CPT

## 2022-05-22 PROCEDURE — 6360000002 HC RX W HCPCS: Performed by: INTERNAL MEDICINE

## 2022-05-22 PROCEDURE — 36415 COLL VENOUS BLD VENIPUNCTURE: CPT

## 2022-05-22 PROCEDURE — 85027 COMPLETE CBC AUTOMATED: CPT

## 2022-05-22 PROCEDURE — 2580000003 HC RX 258: Performed by: INTERNAL MEDICINE

## 2022-05-22 PROCEDURE — 6370000000 HC RX 637 (ALT 250 FOR IP): Performed by: INTERNAL MEDICINE

## 2022-05-22 PROCEDURE — 2060000000 HC ICU INTERMEDIATE R&B

## 2022-05-22 PROCEDURE — 80048 BASIC METABOLIC PNL TOTAL CA: CPT

## 2022-05-22 PROCEDURE — 70551 MRI BRAIN STEM W/O DYE: CPT

## 2022-05-22 RX ADMIN — IRON SUCROSE 200 MG: 20 INJECTION, SOLUTION INTRAVENOUS at 17:18

## 2022-05-22 RX ADMIN — DOXAZOSIN 1 MG: 2 TABLET ORAL at 07:55

## 2022-05-22 RX ADMIN — SODIUM CHLORIDE, PRESERVATIVE FREE 10 ML: 5 INJECTION INTRAVENOUS at 07:55

## 2022-05-22 RX ADMIN — CALCIUM 500 MG: 500 TABLET ORAL at 07:54

## 2022-05-22 RX ADMIN — CARVEDILOL 3.12 MG: 3.12 TABLET, FILM COATED ORAL at 07:54

## 2022-05-22 RX ADMIN — ASPIRIN 81 MG 81 MG: 81 TABLET ORAL at 07:54

## 2022-05-22 RX ADMIN — LEVOTHYROXINE SODIUM 137 MCG: 0.03 TABLET ORAL at 05:46

## 2022-05-22 RX ADMIN — MEGESTROL ACETATE 625 MG: 40 SUSPENSION ORAL at 07:54

## 2022-05-22 RX ADMIN — ATORVASTATIN CALCIUM 20 MG: 10 TABLET, FILM COATED ORAL at 07:54

## 2022-05-22 RX ADMIN — SODIUM CHLORIDE, PRESERVATIVE FREE 20 ML: 5 INJECTION INTRAVENOUS at 21:38

## 2022-05-22 RX ADMIN — AMLODIPINE BESYLATE 10 MG: 5 TABLET ORAL at 07:54

## 2022-05-22 RX ADMIN — HEPARIN SODIUM 5000 UNITS: 5000 INJECTION INTRAVENOUS; SUBCUTANEOUS at 05:45

## 2022-05-22 RX ADMIN — HEPARIN SODIUM 5000 UNITS: 5000 INJECTION INTRAVENOUS; SUBCUTANEOUS at 21:38

## 2022-05-22 NOTE — PROGRESS NOTES
[facility] Progress Note           Subjective/   66y.o. year old male who we are seeing in consultation for ESKD on HD. HPI:  Slowly better  Still weak  Neurology seeing for cognitive concerns   No issues with dialysis  He is trying to eat more     ROS:  States appetite slowly improving, no shortness of breath. Objective/   GEN:  Chronically ill, BP (!) 121/54   Pulse 71   Temp 97.4 °F (36.3 °C) (Oral)   Resp 18   Ht 5' 10\" (1.778 m)   Wt 186 lb 15.2 oz (84.8 kg)   SpO2 98%   BMI 26.82 kg/m²   HEENT: non-icteric, no JVD  CV: S1, S2 without m/r/g; no LE edema  RESP: CTA B without w/r/r; breathing wnl  ABD: +bs, soft, nt, no hsm  SKIN: warm, no rashes  ACCESS: R IJ TDC c/d/i    Data/  Recent Labs     05/21/22  0441 05/22/22  0436   WBC 12.7* 12.0*   HGB 8.6* 8.3*   HCT 26.3* 25.5*   MCV 83.8 84.3    366     Recent Labs     05/21/22  0441 05/22/22  0436   * 138   K 3.5 3.9   CL 96* 102   CO2 27 27   GLUCOSE 126* 112*   PHOS 2.3*  --    BUN 30* 21*   CREATININE 6.6* 5.0*   LABGLOM 8* 11*   GFRAA 10* 14*       Assessment/     - End stage kidney disease on HD Tues-Thurs-Sat              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 [facility]  Office: [phone]

## 2022-05-22 NOTE — FLOWSHEET NOTE
05/22/22 0751   Assessment   Charting Type Shift assessment   Psychosocial   Psychosocial (WDL) WDL   Family Behaviors Calm; Cooperative   Neurological   Neuro (WDL) X   Level of Consciousness Alert (0)   Orientation Level Oriented to person;Oriented to place; Disoriented to situation   Cognition Appropriate attention/concentration; Follows commands   Speech Clear   Eugenio Coma Scale   Eye Opening 4   Best Verbal Response 5   Best Motor Response 6   [place] Coma Scale Score 15   HEENT (Head, Ears, Eyes, Nose, & Throat)   HEENT (WDL) X   Right Eye Glasses; Impaired vision   Left Eye Glasses; Impaired vision   Teeth Dentures upper   Respiratory   Respiratory (WDL) WDL   Respiratory Pattern Regular   Respiratory Depth Normal   Respiratory Quality/Effort Unlabored   Chest Assessment Chest expansion symmetrical   L Breath Sounds Clear   R Breath Sounds Clear   Breath Sounds   Right Upper Lobe Clear   Right Middle Lobe Clear   Right Lower Lobe Diminished   Left Upper Lobe Clear   Left Lower Lobe Diminished   Cardiac   Cardiac (WDL) WDL   Cardiac Regularity Regular   Heart Sounds S1, S2   Cardiac Rhythm Sinus rhythm   Rhythm Interpretation   Pulse 76   Gastrointestinal   Abdominal (WDL) WDL   Abdomen Inspection Rounded; Soft   Last BM (including prior to admit) 05/22/22   RUQ Bowel Sounds Active   LUQ Bowel Sounds Active   RLQ Bowel Sounds Active   LLQ Bowel Sounds Active   Tenderness Soft;Nontender   Genitourinary   Genitourinary (WDL) X  (anuric)   Suprapubic Tenderness No   Dysuria (Pain/Burning w/Urination) No   Difficulty Urinating/Starting Stream SOFIA   Urine Assessment   Urinary Status Anuria   Peripheral Vascular   Peripheral Vascular (WDL) WDL   Edema None   Skin Integumentary    Skin Integumentary (WDL) WDL   Skin Integrity Ecchymosis   Location scattered   Skin Color Pink   Skin Condition/Temp Dry; Warm   Musculoskeletal   Musculoskeletal (WDL) X   RUE Weakness   LUE Weakness   RL Extremity Weakness   LL Extremity Weakness

## 2022-05-22 NOTE — FLOWSHEET NOTE
Assessment Chest expansion symmetrical   Breath Sounds   Right Upper Lobe Clear   Right Middle Lobe Clear   Right Lower Lobe Diminished   Left Upper Lobe Clear   Left Lower Lobe Diminished   Cardiac   Cardiac Regularity Regular   Heart Sounds S1, S2   Cardiac Rhythm Sinus rhythm  (on tele monitor)   Cardiac Monitor   Cardiac/Telemetry Monitor On Portable telemetry pack applied   Telemetry Box Number 90   Gastrointestinal   Abdomen Inspection Rounded; Soft   RUQ Bowel Sounds Active   LUQ Bowel Sounds Active   RLQ Bowel Sounds Active   LLQ Bowel Sounds Active   Tenderness Soft;Nontender   Genitourinary   Genitourinary (WDL)   (Pt is on HD tx. TTH.S. Anuric.)   Suprapubic Tenderness No   Dysuria (Pain/Burning w/Urination) No   Peripheral Vascular   Peripheral Vascular (WDL) WDL   Edema None   Skin Integumentary    Skin Integrity Ecchymosis   Location scattered   Skin Color Pink   Skin Condition/Temp Dry; Warm   Wound Prevention/Protection Method Yes   Location of Wound Prevention Coccyx   Orientation of Wound Prevention Medial   Wound Offloading (Prevention Methods) Repositioning; Wedge pillows   Dressing Present  Yes   Skin Assessed Underneath Dressing This Shift Yes  (skin pinkish, blanchable)   Care Plan - Skin/Tissue Integrity Goals   Skin Integrity Remains Intact Monitor for areas of redness and/or skin breakdown   Musculoskeletal   RUE Weakness   LUE Weakness   RL Extremity Weakness   LL Extremity Weakness   Care Plan - Chronic Conditions and Co-Morbidity   Care Plan - Patient's Chronic Conditions and Co-Morbidity Symptoms are Monitored and Maintained or Improved Monitor and assess patient's chronic conditions and comorbid symptoms for stability, deterioration, or improvement   Care Plan - Discharge Planning Goals   Discharge to home or other facility with appropriate resources Identify barriers to discharge with patient and caregiver

## 2022-05-22 NOTE — PROGRESS NOTES
Pt awake, no c/o pain/discomfort. Denies any needs at this time. Personal belongings and call light within reach.  EFRAÍNRN

## 2022-05-22 NOTE — PROGRESS NOTES
Hospitalist Progress Note      PCP: Lizzy Mena MD    Date of Admission: 5/17/2022    Chief Complaint: NSTEMI (non-ST elevated myocardial infarction) [facility])       Hospital Course: 66yo WM with PMHX sig for  diet-controlled DM2, HTN, HLD, ESRD on dialysis started 3 weeks ago presented to the ED with complaints of generalized weakness.  Patient reports he sees Dr. Trisha Yip with nephrology.  He was started on dialysis about 3 weeks ago.  Since then he has had increasing generalized weakness, decreased appetite.  He finds it difficult to ambulate.  Today morning he reports he was ambulating, went to dialysis finished his dialysis and after he came home he was very weak.  He required assistance to get back into the car to come home.  At home he reports he was extremely weak and slowly went down to the ground by his spouse and she could not get him back up on his feet again so she ended up calling EMS.   He denies any shortness of breath or chest pain.  No syncopal episodes reported.  He reports he has not been eating much.  Still makes urine.  Denied any nausea vomiting or diarrhea, no abdominal pain.  Denies any fevers or chills.  He has a TDC in his right chest.  Patient reports he was about 200 pounds when he started dialysis, currently at 190 pounds.  Stress test normal    Subjective: Patient is lying in the bed complains of  weakness specially lower extremity ,daughter at the bedside.       Medications:  Reviewed    Infusion Medications    sodium chloride      sodium chloride       Scheduled Medications    heparin (porcine)  5,000 Units SubCUTAneous 3 times per day    epoetin katelynn-epbx  10,000 Units IntraVENous Once per day on Tue Thu Sat    megestrol  625 mg Oral Daily    aspirin  81 mg Oral Daily    atorvastatin  20 mg Oral Daily    calcium elemental  500 mg Oral Daily    carvedilol  3.125 mg Oral BID WC    doxazosin  1 mg Oral Daily    levothyroxine  137 mcg Oral Daily    amLODIPine  10 mg Oral Daily  sodium chloride flush  5-40 mL IntraVENous 2 times per day     PRN Meds: heparin (porcine), sodium chloride flush, sodium chloride, ondansetron **OR** ondansetron, polyethylene glycol, acetaminophen **OR** acetaminophen, nitroGLYCERIN, sodium chloride      Intake/Output Summary (Last 24 hours) at 5/22/2022 1000  Last data filed at 5/22/2022 0548  Gross per 24 hour   Intake 890 ml   Output 1000 ml   Net -110 ml       Physical Exam Performed:    BP (!) 123/51   Pulse 76   Temp 97.8 °F (36.6 °C) (Oral)   Resp 16   Ht 5' 10\" (1.778 m)   Wt 186 lb 15.2 oz (84.8 kg)   SpO2 97%   BMI 26.82 kg/m²     General appearance: No apparent distress, appears stated age and cooperative. HEENT: Pupils equal, round, and reactive to light. Conjunctivae/corneas clear. Neck: Supple, with full range of motion. No jugular venous distention. Trachea midline. Respiratory:  Normal respiratory effort. Clear to auscultation, bilaterally without Rales/Wheezes/Rhonchi. Cardiovascular: Regular rate and rhythm with normal S1/S2 without murmurs, rubs or gallops. Abdomen: Soft, non-tender, non-distended with normal bowel sounds. Musculoskeletal: No clubbing, cyanosis or edema bilaterally. Full range of motion without deformity. Skin: Skin color, texture, turgor normal.  No rashes or lesions. Neurologic:  Neurovascularly intact without any focal sensory/motor deficits.  Cranial nerves: II-XII intact, grossly non-focal.  Psychiatric: Alert and oriented, thought content appropriate, normal insight  Capillary Refill: Brisk,3 seconds, normal   Peripheral Pulses: +2 palpable, equal bilaterally       Labs:   Recent Labs     05/21/22  0441 05/22/22 0436   WBC 12.7* 12.0*   HGB 8.6* 8.3*   HCT 26.3* 25.5*    366     Recent Labs     05/21/22  0441 05/22/22  0436   * 138   K 3.5 3.9   CL 96* 102   CO2 27 27   BUN 30* 21*   CREATININE 6.6* 5.0*   CALCIUM 8.4 8.5   PHOS 2.3*  --      No results for input(s): AST, ALT, BILIDIR, BILITOT, ALKPHOS in the last 72 hours. No results for input(s): INR in the last 72 hours. No results for input(s): Les Christina in the last 72 hours. Urinalysis:      Lab Results   Component Value Date    NITRU Negative 05/19/2022    WBCUA 3-5 05/19/2022    BACTERIA Rare 05/25/2018    RBCUA 3-4 05/19/2022    BLOODU SMALL 05/19/2022    SPECGRAV 1.010 05/19/2022    GLUCOSEU Negative 05/19/2022       Radiology:  NM Cardiac Stress Test Nuclear Imaging   Final Result      CT HEAD WO CONTRAST   Final Result   No acute intracranial abnormality. Generalized cerebral atrophy and chronic small vessel white matter ischemic   changes. RECOMMENDATIONS:   Unavailable         XR CHEST PORTABLE   Final Result   No focal lung consolidation. MRI BRAIN WO CONTRAST    (Results Pending)           Assessment/Plan:    Active Hospital Problems    Diagnosis     Cognitive impairment [R41.89]      Priority: Medium    Abnormal ECG [R94.31]      Priority: Medium    PVC (premature ventricular contraction) [I49.3]      Priority: Medium    Murmur [R01.1]      Priority: Medium    Anemia in other chronic diseases classified elsewhere [D63.8]      Priority: Medium    Weakness [R53.1]      Priority: Medium    Elevated troponin [R77.8]      Priority: Medium    Chronic kidney disease, stage 5 (HCC) [N18.5]     NSTEMI (non-ST elevated myocardial infarction) ([facility] [address] [I21.4]     Anemia of chronic renal failure [N18.9, D63.1]     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 NSTEMI cardiology consulted currently cardiac cath on hold as troponin has been stabilized and stress test normal after cardiac catheter outpatient/elective plan to move forward with aortic valve replacement.   2.  End-stage renal disease on hemodialysis nephrology consulted and following. 3.  Anemia of chronic kidney disease stable given 1 pack RBC transfusion due to elevated troponin NSTEMI. 4.  Hypertension controlled continue with current medication. 5.  Diabetes mellitus type 2 continue with the sliding scale ,  hemoglobin A1c= 5.9 on 5/21/22   6. Hyponatremia improved. 7.  Cognitive deficit neurology consulted, neurology consult appreciated MRI of the brain today-  1. Small acute to subacute cortical infarct involving the right brachium   pontis/cerebellar hemisphere. 2. No significant mass effect or acute hemorrhage. 3. Disproportionate enlargement of the ventricles relative to the degree of   parenchymal volume loss, which may reflect more central white matter volume   loss versus NPH in the appropriate clinical setting. 4. Chronic small vessel ischemic changes and remote lacunar infarcts   throughout the sadia. Continue with aspirin, statin, physical Occupational Therapy. Elevated TSH level normal free T4 on 5/17/2022.       DVT Prophylaxis: Heparin subcu  Diet: ADULT DIET; Regular  ADULT ORAL NUTRITION SUPPLEMENT; Breakfast, Dinner; Renal Oral Supplement  Code Status: Full Code    PT/OT Eval Status: Consulted    Dispo - once cleared from cardiology and neurology.     Magaly Cantu MD

## 2022-05-22 NOTE — PROGRESS NOTES
Speech Language Pathology  Facility/Department: [facility]  Initial Speech/Language/Cognitive Assessment    NAME: Roosevelt Sanches  : 1950   MRN: [id number]  ADMISSION DATE: 2022  ADMITTING DIAGNOSIS: has Type 2 diabetes mellitus with renal complication ([facility] [address] Essential hypertension; Hypothyroidism; Mixed hyperlipidemia; Tobacco use disorder; Hyponatremia; Acute infective pericarditis; Pulmonary nodule; Sepsis ([facility] [address] ARF (acute renal failure) ([facility] [address] Crescentic glomerulonephritis; Anemia of chronic renal failure; NSTEMI (non-ST elevated myocardial infarction) ([facility] [address] Chronic kidney disease, stage 5 ([facility] [address] Iron deficiency anemia; Weakness; Elevated troponin; Abnormal ECG; PVC (premature ventricular contraction); Murmur; Anemia in other chronic diseases classified elsewhere; and Cognitive impairment on their problem list.  DATE ONSET: 2022    Date of Eval: 2022   Evaluating Therapist: Faustino George    RECENT RESULTS  CT OF HEAD:    Impression   No acute intracranial abnormality.       Generalized cerebral atrophy and chronic small vessel white matter ischemic   changes.       RECOMMENDATIONS:   Unavailable             Primary Complaint:  \"Patient with PMH of diet-controlled DM2, HTN, HLD, CKD 5 on dialysis started 3 weeks ago presented to the ED with complaints of generalized weakness. Patient reports he sees Dr. Sonia Norris with nephrology. He was started on dialysis about 3 weeks ago. Since then he has had increasing generalized weakness, decreased appetite. He finds it difficult to ambulate. Today morning he reports he was ambulating, went to dialysis finished his dialysis and after he came home he was very weak. He required assistance to get back into the car to come home. At home he reports he was extremely weak and slowly went down to the ground by his spouse and she could not get him back up on his feet again so she ended up calling EMS.   He denies any shortness of breath or chest pain.  No syncopal episodes reported. He reports he has not been eating much. Still makes urine. Denied any nausea vomiting or diarrhea, no abdominal pain. Denies any fevers or chills. He has a TDC in his right chest.  Patient reports he was about 200 pounds when he started dialysis, currently at 190 pounds. \"    Per chart, referred for evaluation due to increased confusion. Patient denies any trouble with memory or changes in cognition. Patient says, \"I get very tired\". When asked why patient is in the hospital, he states \"Because of my memory\". Pain:  Pain Assessment  Pain Assessment: None - Denies Pain  Pain Level: 0    Assessment:  Informal measures used to assess cognitive-linguistic skills secondary to time constraints as patient lethargic, noted closing eyes throughout. Oriented to self and general place (\"hospital\") only. When prompted to be more specific, patient stated \"[facility]\". When provided F:2 for temporal concepts, patient oriented to correct year, but inaccurate month. Patient demonstrated impaired attention to tasks, often requiring many repetitions and prompts from SLP to elicit response. Patient with very delayed processing as well. Lethargy likely impacting at times, though patient observed to be fully alert watching TV prior to SLP entry and only began closing eyes when SLP talking to patient. Patient followed simple, single step commands in isolation with 100% accuracy. Appeared to demonstrate intact auditory comprehension. Further evaluation of verbal expression (ex: naming, word-finding) might be indicated as RN reports patient with \"a hard time getting his words out\". No deficits with motor speech or speech intelligibility. Patient able to recall some daily events, such as what he ate for lunch, as well as seeing PT/OT. However, further evaluation of STM is necessary to better determine recall and memory abilities.  Demonstrated appropriate safety awareness (use of call light, not getting up independently at this time), but further evaluation of problem solving recommended. Cognitive Diagnosis: Patient presents with cognitive-linguistic deficits characterized by impaired attention, orientation, and processing. Recommend further evaluation (memory, problem solving, word-retrieval) and intervention. Recommendations:  Requires SLP Intervention: Yes  Duration of Treatment: 10 days  D/C Recommendations: Ongoing speech therapy is recommended during this hospitalization       Plan:   Goals:  Short-term Goals  Goal 1: Patient will demonstrate recall of novel or functional information following short delays at 60% accuracy for increased STM. Goal 2: Patient will improve orientation to 75% accuracy independently. Goal 3: Patient will demonstrate attention and processing at 75% accuracy in simple conversation or functional tasks given MIN verbal cues. Long-term Goals  Goal 1: Patient will participate in ongoing cognitive-linguistic evaluation and treatment. Patient/family involved in developing goals and treatment plan: Yes    Subjective:   Previous level of function and limitations: Unknown  General  Family / Caregiver Present: Yes  General Comment  Comments: Eval and treat orders received. RN cleared SLP for entry. RN reports that patient Patricia Ficks a hard time getting words out\". Subjective  Subjective: Patient alert in bed, watching TV with visitor at bedside. Agreeable to evaluation this date. Cognition:      Orientation  Overall Orientation Status: Impaired  Orientation Level: Oriented to person (Patient oriented to general place (\"hospital\"), though when cued to be more specific, stated \"[facility]\". Disoriented to month. Oriented to year when given F:2 (1990 vs 2022). )  Attention  Attention: Exceptions to [facility]  Divided Attention: Moderate  Selective Attention: Moderate  Sustained Attention: Moderate  Memory  Memory: Unable to assess (Able to recall some information about his day, such as what he ate for lunch and seeing PT/OT.  Recommend further assessment of STM.)  Problem Solving  Problem Solving: Unable to assess    Additional Assessments:             Prognosis:  Speech Therapy Prognosis  Prognosis: Good  Prognosis Considerations: Medical Diagnosis  Individuals consulted  Consulted and agree with results and recommendations: Family member;Patient  Family member consulted: Daughter    Education:  Patient Education Response: Needs reinforcement          Therapy Time:   Individual Concurrent Group Co-treatment   Time In  [facility]8         Time Out  1410         Minutes  22 minutes                 [facility]  5/22/2022 2:22 PM     Betzy Washburn M.A., [address] [place] [address]  Speech-Language Pathologist

## 2022-05-23 PROCEDURE — 6360000002 HC RX W HCPCS: Performed by: INTERNAL MEDICINE

## 2022-05-23 PROCEDURE — 99233 SBSQ HOSP IP/OBS HIGH 50: CPT | Performed by: NURSE PRACTITIONER

## 2022-05-23 PROCEDURE — 97530 THERAPEUTIC ACTIVITIES: CPT

## 2022-05-23 PROCEDURE — 2580000003 HC RX 258: Performed by: INTERNAL MEDICINE

## 2022-05-23 PROCEDURE — 6370000000 HC RX 637 (ALT 250 FOR IP): Performed by: INTERNAL MEDICINE

## 2022-05-23 PROCEDURE — 97110 THERAPEUTIC EXERCISES: CPT

## 2022-05-23 PROCEDURE — 2060000000 HC ICU INTERMEDIATE R&B

## 2022-05-23 RX ORDER — LEVOTHYROXINE SODIUM 0.15 MG/1
150 TABLET ORAL DAILY
Status: DISCONTINUED | OUTPATIENT
Start: 2022-05-24 | End: 2022-05-26 | Stop reason: HOSPADM

## 2022-05-23 RX ADMIN — ASPIRIN 81 MG 81 MG: 81 TABLET ORAL at 10:18

## 2022-05-23 RX ADMIN — DOXAZOSIN 1 MG: 2 TABLET ORAL at 10:17

## 2022-05-23 RX ADMIN — HEPARIN SODIUM 5000 UNITS: 5000 INJECTION INTRAVENOUS; SUBCUTANEOUS at 14:52

## 2022-05-23 RX ADMIN — MEGESTROL ACETATE 625 MG: 40 SUSPENSION ORAL at 10:18

## 2022-05-23 RX ADMIN — CARVEDILOL 3.12 MG: 3.12 TABLET, FILM COATED ORAL at 17:01

## 2022-05-23 RX ADMIN — HEPARIN SODIUM 5000 UNITS: 5000 INJECTION INTRAVENOUS; SUBCUTANEOUS at 23:11

## 2022-05-23 RX ADMIN — HEPARIN SODIUM 5000 UNITS: 5000 INJECTION INTRAVENOUS; SUBCUTANEOUS at 05:53

## 2022-05-23 RX ADMIN — IRON SUCROSE 200 MG: 20 INJECTION, SOLUTION INTRAVENOUS at 17:07

## 2022-05-23 RX ADMIN — SODIUM CHLORIDE, PRESERVATIVE FREE 10 ML: 5 INJECTION INTRAVENOUS at 23:18

## 2022-05-23 RX ADMIN — ATORVASTATIN CALCIUM 20 MG: 10 TABLET, FILM COATED ORAL at 10:17

## 2022-05-23 RX ADMIN — CALCIUM 500 MG: 500 TABLET ORAL at 10:18

## 2022-05-23 RX ADMIN — SODIUM CHLORIDE, PRESERVATIVE FREE 10 ML: 5 INJECTION INTRAVENOUS at 10:20

## 2022-05-23 RX ADMIN — CARVEDILOL 3.12 MG: 3.12 TABLET, FILM COATED ORAL at 10:18

## 2022-05-23 RX ADMIN — LEVOTHYROXINE SODIUM 137 MCG: 0.03 TABLET ORAL at 05:53

## 2022-05-23 ASSESSMENT — PAIN SCALES - GENERAL
PAINLEVEL_OUTOF10: 0
PAINLEVEL_OUTOF10: 0

## 2022-05-23 NOTE — FLOWSHEET NOTE
05/22/22 2010   Assessment   Charting Type Shift assessment   Psychosocial   Psychosocial (WDL) WDL   Family Behaviors Calm; Cooperative   Neurological   Level of Consciousness Alert (0)   Orientation Level Oriented to person;Oriented to place; Disoriented to situation   Cognition Appropriate attention/concentration; Follows commands   Speech Clear   R Pupil Size (mm) 3   R Pupil Shape Round   R Pupil Reaction Brisk   L Pupil Size (mm) 3   L Pupil Shape Round   L Pupil Reaction Brisk   R Hand  Strong   L Hand  Strong   R Foot Dorsiflexion Strong   L Foot Dorsiflexion Strong   R Foot Plantar Flexion Strong   L Foot Plantar Flexion Strong   RUE Motor Response Normal extension;Normal flexion; Responds to command   RUE Sensation  Full sensation   LUE Motor Response Normal extension;Normal flexion; Responds to command   LUE Sensation  Full sensation   RLE Motor Response Normal extension;Normal flexion; Responds to command   RLE Sensation  Full sensation   LLE Motor Response Responds to command   LLE Sensation  Full sensation   Gag Present   Cough Reflex Present   Swallow Screening   Is the patient unable to remain alert for testing? No   Is the patient on a modified diet (thickened liquids) due to pre-existing dysphagia? No   Is there presence of existing enteral tube feeding via the stomach or nose? No   Is there presence of head-of-bed restrictions (less than 30 degrees)? No   Is there presence of tracheotomy tube? No   Is the patient ordered nothing-by-mouth status? No   3 oz Water Swallow Screen Pass   Eugenio Coma Scale   Eye Opening 4   Best Verbal Response 5   Best Motor Response 6   Eugenio Coma Scale Score 15   HEENT (Head, Ears, Eyes, Nose, & Throat)   HEENT (WDL) WDL   Right Eye Glasses; Impaired vision   Left Eye Glasses; Impaired vision   Teeth Dentures upper   Respiratory   Respiratory (WDL) WDL   Respiratory Pattern Regular   Respiratory Depth Normal   Respiratory Quality/Effort Unlabored   Chest Assessment Chest expansion symmetrical   Breath Sounds   Right Upper Lobe Clear   Right Middle Lobe Clear   Right Lower Lobe Diminished   Left Upper Lobe Clear   Left Lower Lobe Diminished   Cardiac   Cardiac Regularity Regular   Heart Sounds S1, S2   Cardiac Rhythm Sinus rhythm  (on tele monitor)   Cardiac Monitor   Cardiac/Telemetry Monitor On Portable telemetry pack applied   Telemetry Box Number 90   Gastrointestinal   Abdomen Inspection Rounded; Soft   Last BM (including prior to admit) 05/22/22   RUQ Bowel Sounds Active   LUQ Bowel Sounds Active   RLQ Bowel Sounds Active   LLQ Bowel Sounds Active   Tenderness Soft;Nontender   Genitourinary   Genitourinary (WDL)   (Pt is on HD tx. TTH.S.  Anuric.)   Suprapubic Tenderness No   Dysuria (Pain/Burning w/Urination) No   Peripheral Vascular   Peripheral Vascular (WDL) WDL   Edema None   Musculoskeletal   RUE Weakness   LUE Weakness   RL Extremity Weakness   LL Extremity Weakness

## 2022-05-23 NOTE — PROGRESS NOTES
Occupational Therapy  Facility/Department: [facility]  Daily Treatment Note  NAME: Chadwick Sherman  : 1950  MRN: [id number]    Date of Service: 2022    Discharge Recommendations:  [address]         Patient Diagnosis(es): The primary encounter diagnosis was NSTEMI (non-ST elevated myocardial infarction) ([address] Diagnoses of Generalized weakness, Anemia, unspecified type, and Hyponatremia were also pertinent to this visit. Assessment    Assessment: Pt required max encouragement for participation in therapy this date. Increased time spent at beginning of session providing therapuetic listening. Engaged pt in BUE exercises, bed mobility, and static sitting at EOB. Pt is grossly SBA for bed mobility and noted with slight lateral lean to R sitting at EOB. Discussed use of Jesse Coates for transfer with patient however pt appeared gaurded towards transfers. Pt would benefit from continued skilled occupational therapy services during acute hospital stay. Cont per POC. Activity Tolerance: Treatment limited secondary to agitation;Treatment limited secondary to decreased cognition  Discharge Recommendations: [address]  Times per Week: 2-3x/wk  Current Treatment Recommendations: Strengthening; Functional mobility training; Endurance training; Safety education & training;Patient/Caregiver education & training;Self-Care / ADL; Home management training     Restrictions  Restrictions/Precautions  Restrictions/Precautions: General Precautions,NPO,Bed Alarm  Position Activity Restriction  Other position/activity restrictions: TDC, NPO pending stress test, IV    Subjective   Subjective  Subjective: Pt presented in bed and requiring encouragement for participation in therapy session.   Pain: Pt denies pain  Cognition  Overall Cognitive Status: Exceptions  Arousal/Alertness: Delayed responses to stimuli  Following Commands: Inconsistently follows commands  Attention Span: Attends with cues to redirect  Memory: Decreased recall of recent events;Decreased short term memory  Safety Judgement: Decreased awareness of need for assistance;Decreased awareness of need for safety  Insights: Decreased awareness of deficits  Initiation: Requires cues for some  Cognition Comment: Pt appears gaurded towards EOB/OOB activity with increased agitation during bed mobility. Objective    Vitals   /57  Pulse 69  SpO2 95% on RA  Bed Mobility Training  Bed Mobility Training: Yes  Overall Level of Assistance: Contact-guard assistance  Interventions: Verbal cues; Tactile cues; Safety awareness training  Rolling: Stand-by assistance  Supine to Sit: Contact-guard assistance (HOB elevated; use of BR)  Sit to Supine: Stand-by assistance  Scooting: Contact-guard assistance (to EOB; pt refusing to bridge to scoot up in bed however performed with SBA when RN present)  Balance  Sitting: Impaired (CGA with slight lateral lean to R)  Transfer Training  Transfer Training: No (Pt initially agreeable to attempt STS with use of tanesha stedy but refusing transfer training when attempted to don gait belt)     ADL  Additional Comments: Pt refusing ADLs  OT Exercises  A/AROM Exercises: Engaged pt in x10 reps of receprical AROM with BUE with decreased coordination noted with LUE>RUE. Dynamic Sitting Balance Exercises: Pt tolerated sitting at EOB for ~4 mins while completing AROM exercises. Patient Education  Education Given To: Patient  Education Provided: Role of Therapy;Plan of Care;Orientation; ADL Adaptive Strategies; Family Education; Energy Conservation;Transfer Training  Education Provided Comments: Disease Specific Education: Pt educated on importance of OOB mobility, prevention of complications of bedrest, and general safety during hospitalization. Education Method: Demonstration;Verbal  Barriers to Learning: Cognition  Education Outcome: Continued education needed; Unable to verbalize    AM-PAC score  AM-PAC Inpatient Daily Activity Raw Score: 14 (05/23/22 1623)  AM-PAC Inpatient ADL T-Scale Score : 33.39 (05/23/22 1623)  ADL Inpatient CMS 0-100% Score: 59.67 (05/23/22 1623)  ADL Inpatient CMS G-Code Modifier : CK (05/23/22 1623)      Goals  Short Term Goals  Time Frame for Short term goals: 1wk (5/27/22)  Short Term Goal 1: Pt will complete LE dressing with SBA  Short Term Goal 2: Pt will complete functional t/fs with CGA  Short Term Goal 3: Pt will complete 1-2 grooming tasks in stance at sink with SBA by 5/24  Short Term Goal 4: Pt will complete 5x15-20 reps BUE therex for increase in functional strength  Patient Goals   Patient goals : pt unwilling to state at this time       Therapy Time   Individual Concurrent Group Co-treatment   Time In 1517         Time Out 1556         Minutes 39         Timed Code Treatment Minutes: 39 Minutes     If pt is unable to be seen after this session, please let this note serve as discharge summary. Please see case management note for discharge disposition. Thank you.       Regina Jacobson, OT

## 2022-05-23 NOTE — PROGRESS NOTES
Called [facility] lab to question time of carotid testing, they stated one on table, 2 to go, then they will get pt in today. They are aware he has hd tomorrow. No one called for this pt today, for carotid u.s.

## 2022-05-23 NOTE — PROGRESS NOTES
Hospitalist Progress Note      PCP: Nadja Benito MD    Date of Admission: 5/17/2022    Chief Complaint: nstemi    Hospital Course: reviewed     Subjective: resting in bed, denies any focal deficits       Medications:  Reviewed    Infusion Medications    sodium chloride      sodium chloride       Scheduled Medications    iron sucrose (VENOFER) iv piggyback 100 mL (Admin over 60 minutes)  200 mg IntraVENous Q24H    heparin (porcine)  5,000 Units SubCUTAneous 3 times per day    epoetin katelynn-epbx  10,000 Units IntraVENous Once per day on Tue Thu Sat    megestrol  625 mg Oral Daily    aspirin  81 mg Oral Daily    atorvastatin  20 mg Oral Daily    calcium elemental  500 mg Oral Daily    carvedilol  3.125 mg Oral BID WC    doxazosin  1 mg Oral Daily    levothyroxine  137 mcg Oral Daily    sodium chloride flush  5-40 mL IntraVENous 2 times per day     PRN Meds: heparin (porcine), sodium chloride flush, sodium chloride, ondansetron **OR** ondansetron, polyethylene glycol, acetaminophen **OR** acetaminophen, nitroGLYCERIN, sodium chloride      Intake/Output Summary (Last 24 hours) at 5/23/2022 1022  Last data filed at 5/23/2022 1020  Gross per 24 hour   Intake 340 ml   Output 0 ml   Net 340 ml       Physical Exam Performed:    /62   Pulse 83   Temp 98.3 °F (36.8 °C) (Axillary)   Resp 20   Ht 5' 10\" (1.778 m)   Wt 185 lb 10 oz (84.2 kg)   SpO2 95%   BMI 26.63 kg/m²     General appearance: No apparent distress, appears stated age and cooperative. HEENT: Pupils equal, round, and reactive to light. Conjunctivae/corneas clear. Neck: Supple, with full range of motion. No jugular venous distention. Trachea midline. Rt neck Hd line noted  Respiratory:  Normal respiratory effort. Clear to auscultation, bilaterally without Rales/Wheezes/Rhonchi. Cardiovascular: Regular rate and rhythm with normal S1/S2 without murmurs, rubs or gallops.   Abdomen: Soft, non-tender, non-distended with normal bowel sounds. Musculoskeletal: No clubbing, cyanosis or edema bilaterally. Full range of motion without deformity. Skin: Skin color, texture, turgor normal.  No rashes or lesions. Neurologic:  Neurovascularly intact without any focal sensory/motor deficits. Cranial nerves: II-XII intact, grossly non-focal.  Psychiatric: Alert and oriented, thought content appropriate, normal insight  Capillary Refill: Brisk,3 seconds, normal   Peripheral Pulses: +2 palpable, equal bilaterally       Labs:   Recent Labs     05/21/22  0441 05/22/22  0436   WBC 12.7* 12.0*   HGB 8.6* 8.3*   HCT 26.3* 25.5*    366     Recent Labs     05/21/22 0441 05/22/22  0436   * 138   K 3.5 3.9   CL 96* 102   CO2 27 27   BUN 30* 21*   CREATININE 6.6* 5.0*   CALCIUM 8.4 8.5   PHOS 2.3*  --      No results for input(s): AST, ALT, BILIDIR, BILITOT, ALKPHOS in the last 72 hours. No results for input(s): INR in the last 72 hours. No results for input(s): Willow Benavides in the last 72 hours. Urinalysis:      Lab Results   Component Value Date    NITRU Negative 05/19/2022    WBCUA 3-5 05/19/2022    BACTERIA Rare 05/25/2018    RBCUA 3-4 05/19/2022    BLOODU SMALL 05/19/2022    SPECGRAV 1.010 05/19/2022    GLUCOSEU Negative 05/19/2022       Radiology:  MRI BRAIN WO CONTRAST   Final Result   1. Small acute to subacute cortical infarct involving the right brachium   pontis/cerebellar hemisphere. 2. No significant mass effect or acute hemorrhage. 3. Disproportionate enlargement of the ventricles relative to the degree of   parenchymal volume loss, which may reflect more central white matter volume   loss versus NPH in the appropriate clinical setting. 4. Chronic small vessel ischemic changes and remote lacunar infarcts   throughout the sadia. NM Cardiac Stress Test Nuclear Imaging   Final Result      CT HEAD WO CONTRAST   Final Result   No acute intracranial abnormality.       Generalized cerebral atrophy and chronic small vessel white matter ischemic   changes. RECOMMENDATIONS:   Unavailable         XR CHEST PORTABLE   Final Result   No focal lung consolidation. Assessment/Plan:    Active Hospital Problems    Diagnosis     Cognitive impairment [R41.89]      Priority: Medium    Abnormal ECG [R94.31]      Priority: Medium    PVC (premature ventricular contraction) [I49.3]      Priority: Medium    Murmur [R01.1]      Priority: Medium    Anemia in other chronic diseases classified elsewhere [D63.8]      Priority: Medium    Weakness [R53.1]      Priority: Medium    Elevated troponin [R77.8]      Priority: Medium    Chronic kidney disease, stage 5 (HCC) [N18.5]     NSTEMI (non-ST elevated myocardial infarction) [facility] [facility] [I21.4]     Anemia of chronic renal failure [N18.9, D63.1]     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     End-stage renal disease-on hemodialysis nephrology consulted and following.     Anemia of chronic kidney disease stable given 1 pack RBC transfusion due to elevated troponin NSTEMI.     Hypertension controlled continue with current medication.     Diabetes mellitus type 2 continue with the sliding scale ,  hemoglobin A1c= 5.9 on 5/21/22      Hyponatremia improved.      Cognitive deficit-  neurology consulted, neurology consult appreciated   -MRI of the brain performed(noted smal acute /subacute cortical infarct in rt brachium pontis/cerebellar hemisphere)  Continued with aspirin, statin, physical Occupational Therapy.     Hypothyroid- with Elevated TSH level, borderline low normal free T4 on 5/17/2022. inc'd synthroid to 150mcg daily from 137mcg    DVT Prophylaxis: heparin sq  Diet: ADULT DIET;  Regular  ADULT ORAL NUTRITION SUPPLEMENT; Breakfast, Dinner; Renal Oral Supplement  Code Status: Full Code    PT/OT Eval Status: rec SNF    Dispo - pending neuro recs, possible placement, possibly tues/wed    Yudi Rasmussen MD

## 2022-05-23 NOTE — PROGRESS NOTES
[facility]  Neurology Follow-up  [facility] Neurology    Date of Service: 5/23/2022    Subjective:   CC: Follow up today regarding: Cognitive issues    Events noted. Chart and lab reviewed. Day 6 of hospitalization. MRI findings discussed with patient. He denies any balance issues, dizziness. Reportedly, has been refusing PT/OT      ROS : A 10-12 system review obtained and updated today and is unremarkable except as mentioned  in my interval history. Family history is non-contributory.       Past Medical History:   Diagnosis Date    Anemia     Diabetes mellitus [facility] [facility]     ESRD (end stage renal disease) on dialysis ([facility] [address]     Tues-Thurs-Sat    Hyperlipidemia     Hypertension      Current Facility-Administered Medications   Medication Dose Route Frequency Provider Last Rate Last Admin    iron sucrose (VENOFER) 200 mg in sodium chloride 0.9 % 100 mL IVPB  200 mg IntraVENous Q24H Gladys Almaraz MD   Stopped at 05/22/22 1745    heparin (porcine) injection 3,700 Units  3,700 Units IntraCATHeter PRN Frantz Zheng MD        heparin (porcine) injection 5,000 Units  5,000 Units SubCUTAneous 3 times per day Natasha Deluca MD   5,000 Units at 05/23/22 0553    epoetin katelynn-epbx (RETACRIT) injection 10,000 Units  10,000 Units IntraVENous Once per day on Tue Thu Sat Frantz Zheng MD   10,000 Units at 05/21/22 0839    megestrol (MEGACE) 40 MG/ML suspension 625 mg  625 mg Oral Daily Frantz Zheng MD   625 mg at 05/23/22 1018    aspirin chewable tablet 81 mg  81 mg Oral Daily Reggie Saul MD   81 mg at 05/23/22 1018    atorvastatin (LIPITOR) tablet 20 mg  20 mg Oral Daily Reggie Saul MD   20 mg at 05/23/22 1017    calcium elemental (OSCAL) tablet 500 mg  500 mg Oral Daily Reggie Saul MD   500 mg at 05/23/22 1018    carvedilol (COREG) tablet 3.125 mg  3.125 mg Oral BID WC Reggie Saul MD   3.125 mg at 05/23/22 1018    doxazosin (CARDURA) tablet 1 mg  1 mg Oral Daily Gagandeep Pearson MD   1 mg at 05/23/22 1017    levothyroxine (SYNTHROID) tablet 137 mcg  137 mcg Oral Daily Gagandeep Pearson MD   137 mcg at 05/23/22 0553    sodium chloride flush 0.9 % injection 5-40 mL  5-40 mL IntraVENous 2 times per day Gagandeep Pearson MD   10 mL at 05/23/22 1020    sodium chloride flush 0.9 % injection 5-40 mL  5-40 mL IntraVENous PRN Gagandeep Pearson MD        0.9 % sodium chloride infusion   IntraVENous PRN Gagandeep Pearson MD        ondansetron (ZOFRAN-ODT) disintegrating tablet 4 mg  4 mg Oral Q8H PRN Gagandeep Pearson MD        Or    ondansetron (ZOFRAN) injection 4 mg  4 mg IntraVENous Q6H PRN Gagandeep Pearson MD        polyethylene glycol (GLYCOLAX) packet 17 g  17 g Oral Daily PRN Gagandeep Pearson MD        acetaminophen (TYLENOL) tablet 650 mg  650 mg Oral Q6H PRN Gagandeep Pearson MD        Or    acetaminophen (TYLENOL) suppository 650 mg  650 mg Rectal Q6H PRN Gagnadeep Pearson MD        nitroGLYCERIN (NITROSTAT) SL tablet 0.4 mg  0.4 mg SubLINGual Q5 Min PRN Gagandeep Pearson MD        0.9 % sodium chloride infusion   IntraVENous PRN Dayo Mohan MD         No Known Allergies   reports that he quit smoking about 4 years ago. His smoking use included cigarettes. He smoked 0.50 packs per day. He has never used smokeless tobacco. He reports that he does not drink alcohol and does not use drugs. Objective:  Exam:   Constitutional:   Vitals:    05/22/22 2327 05/23/22 0335 05/23/22 0556 05/23/22 0840   BP: (!) 115/44 (!) 124/51  126/62   Pulse: 68 70  83   Resp: 18 19  20   Temp: 97.7 °F (36.5 °C) 97.8 °F (36.6 °C)  98.3 °F (36.8 °C)   TempSrc: Oral Oral  Axillary   SpO2: 95% 94%  95%   Weight:   185 lb 10 oz (84.2 kg)    Height:         General appearance:  Chronically ill appearing, normal development and appear in no acute distress. Mental Status:   Oriented to person, place, problem, and time.     Memory: Good immediate recall. Intact remote memory  Normal attention span and concentration. Language: intact naming, repeating and fluency   Good fund of Knowledge. Cranial Nerves:   II: Visual fields: Full. Pupils: equal, round, reactive to light  III,IV,VI: Extra Ocular Movements are intact. No nystagmus  V: Facial sensation is intact  VII: Facial strength and movements: intact and symmetric  IX: Palate elevation is symmetric  XI: Shoulder shrug is intact  XII: Tongue movements are normal  Musculoskeletal: 5/5 in all 4 extremities. Tone: Normal tone. Reflexes: Symmetric 2+ in both arms and legs. Coordination: no pronator drift, no dysmetria with FNF  Sensation: normal to all modalities in both arms and legs. Gait/Posture: did not test gait. Data:  LABS:   Lab Results   Component Value Date     05/22/2022    K 3.9 05/22/2022     05/22/2022    CO2 27 05/22/2022    BUN 21 05/22/2022    CREATININE 5.0 05/22/2022    GFRAA 14 05/22/2022    LABGLOM 11 05/22/2022    GLUCOSE 112 05/22/2022    PHOS 2.3 05/21/2022    MG 1.90 05/17/2022    CALCIUM 8.5 05/22/2022     Lab Results   Component Value Date    WBC 12.0 05/22/2022    RBC 3.02 05/22/2022    HGB 8.3 05/22/2022    HCT 25.5 05/22/2022    MCV 84.3 05/22/2022    RDW 15.4 05/22/2022     05/22/2022     Lab Results   Component Value Date    INR 1.21 (H) 04/04/2018    PROTIME 13.7 (H) 04/04/2018       Neuroimaging was independently reviewed by me and discussed results with the patient  I reviewed blood testing and other test results and discussed results with the patient      Impression:    Generalized weakness with impaired cognition -  MRI of brain revealed subacute right cerebellar infarct. CT head negative. ESRD on HD  Elevated troponin  HTN, controlled. BP is actually soft. HLD  DM2, controlled. A1c 5.9  AS  Hypothyroidism    Recommendation    Monitor on tele. Carotid US reviewed.     Patient notes he stopped taking ASA prior to hospitalization, but is unable to tell me exactly why. Continue ASA 81 mg daily. Continue statin. Continue home BP meds. TSH was 96.09 - defer to primary team.    Improved glycemic control. SSI coverage while inpatient. PT/OT/SLP  HD per nephrology. Avoid SBP < 120. Addendum 5/25/22:  After further discussion with the hospitalist team, it appears the patient WAS compliant with his ASA at home. We will consider this an aspirin failure and change to Plavix 75 mg daily. Arie Hoffman CNP      This dictation was generated by voice recognition computer software. Although all attempts are made to edit the dictation for accuracy, there may be errors in the transcription that are not intended.

## 2022-05-23 NOTE — PROGRESS NOTES
Pt requested that I call his wife to give her an update on poc, complied. Wife had procedure herself today and cannot drive, she will be in after noon tomorrow.

## 2022-05-23 NOTE — PROGRESS NOTES
[facility] Progress Note           Subjective/   66y.o. year old male who we are seeing in consultation for ESKD on HD. HPI:  Last HD on 5/21 with 200 ml removed, post-weight of 84 kg.  +weak. ROS:  States appetite slowly improving, no shortness of breath. Objective/   GEN:  Chronically ill, /62   Pulse 83   Temp 98.3 °F (36.8 °C) (Axillary)   Resp 20   Ht 5' 10\" (1.778 m)   Wt 185 lb 10 oz (84.2 kg)   SpO2 95%   BMI 26.63 kg/m²   HEENT: non-icteric, no JVD  CV: S1, S2 without m/r/g; no LE edema  RESP: CTA B without w/r/r; breathing wnl  ABD: +bs, soft, nt, no hsm  SKIN: warm, no rashes  ACCESS: R IJ TDC c/d/i    Data/  Recent Labs     05/21/22  0441 05/22/22  0436   WBC 12.7* 12.0*   HGB 8.6* 8.3*   HCT 26.3* 25.5*   MCV 83.8 84.3    366     Recent Labs     05/21/22  0441 05/22/22  0436   * 138   K 3.5 3.9   CL 96* 102   CO2 27 27   GLUCOSE 126* 112*   PHOS 2.3*  --    BUN 30* 21*   CREATININE 6.6* 5.0*   LABGLOM 8* 11*   GFRAA 10* 14*       Assessment/     - End stage kidney disease on HD Tues-Thurs-Sat              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 CT head negative. - Hypothyroidism - on replacement    Plan/     - HD TTS schedule, EDW 85 kg would not challenge too much  - Course of IV iron  - Off norvasc   - Trend labs, bp's    ____________________________________  Monica Mendez MD  [facility]  [email or web address]  Office: [phone]

## 2022-05-23 NOTE — CARE COORDINATION
Met with patient to discuss care plan; patient sleepy and not receptive to discussion about SNF's, C. States he wants to see how he progresses and does not want to discuss further. Explained that it's important to plan ahead with his ESRD and need for dialysis and diagnosis of stroke. Call to wife to discuss plan of care; wife very easy to engage, says spouse Erin Masters get real grouchy\" and has a hard time admitting he is struggling with health issues. States she needs to have a valve replaced soon and is unsure she can provide the care needed at home; states she typically is able to care for him and that up until recently he was doing \"pretty well\"; states he was able to walk some and that they made it work. Explained [facility] and network of SNF's and need to get precert. Wife states she will visit tomorrow; had procedure today, an angiogram, and unable to visit today; states she will talk to him about care plan and contact dc planner.         LG

## 2022-05-24 ENCOUNTER — APPOINTMENT (OUTPATIENT)
Dept: VASCULAR LAB | Age: 72
DRG: 280 | End: 2022-05-24
Payer: MEDICARE

## 2022-05-24 LAB
ALBUMIN SERPL-MCNC: 2 G/DL (ref 3.4–5)
ANION GAP SERPL CALCULATED.3IONS-SCNC: 10 MMOL/L (ref 3–16)
BUN BLDV-MCNC: 42 MG/DL (ref 7–20)
CALCIUM SERPL-MCNC: 8.8 MG/DL (ref 8.3–10.6)
CHLORIDE BLD-SCNC: 102 MMOL/L (ref 99–110)
CO2: 25 MMOL/L (ref 21–32)
CREAT SERPL-MCNC: 7.4 MG/DL (ref 0.8–1.3)
GFR AFRICAN AMERICAN: 9
GFR NON-AFRICAN AMERICAN: 7
GLUCOSE BLD-MCNC: 128 MG/DL (ref 70–99)
HCT VFR BLD CALC: 22.9 % (ref 40.5–52.5)
HEMOGLOBIN: 7.6 G/DL (ref 13.5–17.5)
MCH RBC QN AUTO: 27.8 PG (ref 26–34)
MCHC RBC AUTO-ENTMCNC: 33.1 G/DL (ref 31–36)
MCV RBC AUTO: 84 FL (ref 80–100)
PDW BLD-RTO: 15.8 % (ref 12.4–15.4)
PHOSPHORUS: 2.9 MG/DL (ref 2.5–4.9)
PLATELET # BLD: 382 K/UL (ref 135–450)
PMV BLD AUTO: 7 FL (ref 5–10.5)
POTASSIUM SERPL-SCNC: 4.2 MMOL/L (ref 3.5–5.1)
RBC # BLD: 2.73 M/UL (ref 4.2–5.9)
SODIUM BLD-SCNC: 137 MMOL/L (ref 136–145)
WBC # BLD: 12.6 K/UL (ref 4–11)

## 2022-05-24 PROCEDURE — 6360000002 HC RX W HCPCS: Performed by: INTERNAL MEDICINE

## 2022-05-24 PROCEDURE — 2580000003 HC RX 258: Performed by: INTERNAL MEDICINE

## 2022-05-24 PROCEDURE — 6370000000 HC RX 637 (ALT 250 FOR IP): Performed by: INTERNAL MEDICINE

## 2022-05-24 PROCEDURE — 2060000000 HC ICU INTERMEDIATE R&B

## 2022-05-24 PROCEDURE — 6360000002 HC RX W HCPCS

## 2022-05-24 PROCEDURE — 80069 RENAL FUNCTION PANEL: CPT

## 2022-05-24 PROCEDURE — 90935 HEMODIALYSIS ONE EVALUATION: CPT

## 2022-05-24 PROCEDURE — 36415 COLL VENOUS BLD VENIPUNCTURE: CPT

## 2022-05-24 PROCEDURE — 93880 EXTRACRANIAL BILAT STUDY: CPT

## 2022-05-24 PROCEDURE — 85027 COMPLETE CBC AUTOMATED: CPT

## 2022-05-24 RX ORDER — HEPARIN SODIUM 1000 [USP'U]/ML
INJECTION, SOLUTION INTRAVENOUS; SUBCUTANEOUS
Status: COMPLETED
Start: 2022-05-24 | End: 2022-05-24

## 2022-05-24 RX ADMIN — HEPARIN SODIUM 5000 UNITS: 5000 INJECTION INTRAVENOUS; SUBCUTANEOUS at 21:07

## 2022-05-24 RX ADMIN — DOXAZOSIN 1 MG: 2 TABLET ORAL at 13:08

## 2022-05-24 RX ADMIN — HEPARIN SODIUM 5000 UNITS: 5000 INJECTION INTRAVENOUS; SUBCUTANEOUS at 13:08

## 2022-05-24 RX ADMIN — SODIUM CHLORIDE, PRESERVATIVE FREE 10 ML: 5 INJECTION INTRAVENOUS at 21:07

## 2022-05-24 RX ADMIN — EPOETIN ALFA-EPBX 10000 UNITS: 10000 INJECTION, SOLUTION INTRAVENOUS; SUBCUTANEOUS at 10:50

## 2022-05-24 RX ADMIN — ASPIRIN 81 MG 81 MG: 81 TABLET ORAL at 13:08

## 2022-05-24 RX ADMIN — LEVOTHYROXINE SODIUM 150 MCG: 0.15 TABLET ORAL at 06:16

## 2022-05-24 RX ADMIN — ATORVASTATIN CALCIUM 20 MG: 10 TABLET, FILM COATED ORAL at 13:08

## 2022-05-24 RX ADMIN — MEGESTROL ACETATE 625 MG: 40 SUSPENSION ORAL at 13:11

## 2022-05-24 RX ADMIN — HEPARIN SODIUM 3700 UNITS: 1000 INJECTION INTRAVENOUS; SUBCUTANEOUS at 10:55

## 2022-05-24 RX ADMIN — IRON SUCROSE 200 MG: 20 INJECTION, SOLUTION INTRAVENOUS at 15:48

## 2022-05-24 RX ADMIN — CALCIUM 500 MG: 500 TABLET ORAL at 13:08

## 2022-05-24 RX ADMIN — CARVEDILOL 3.12 MG: 3.12 TABLET, FILM COATED ORAL at 15:44

## 2022-05-24 RX ADMIN — HEPARIN SODIUM 5000 UNITS: 5000 INJECTION INTRAVENOUS; SUBCUTANEOUS at 06:16

## 2022-05-24 RX ADMIN — HEPARIN SODIUM 3700 UNITS: 1000 INJECTION, SOLUTION INTRAVENOUS; SUBCUTANEOUS at 10:55

## 2022-05-24 ASSESSMENT — PAIN SCALES - GENERAL
PAINLEVEL_OUTOF10: 0
PAINLEVEL_OUTOF10: 0

## 2022-05-24 NOTE — CARE COORDINATION
Spoke with spouse regarding SNF placement. Left SNF list and CM business card in room for her when she arrives. Discussed facility preferences over the phone. She is agreeable to a referral to [facility] which is pending. Neurology and nephrology following. Cardiology signed off on 5/20 , RN to clarify if they need to be re-consulted based on echo results. Wife would like MD to call her. Perfect served  to notify him of wife's request that he call her. Will follow for [facility] acceptance and coordinate d/c to SNF when medically stable. PT to see today. Need updated notes with current [facility].

## 2022-05-24 NOTE — FLOWSHEET NOTE
Treatment time: 3 hours  Net UF: 1053 ml    Pre weight: 85.1 kg   Post weight: 84 kg  EDW: 85 kg ( Last post HD weight: 84 kg)    Access used: right chest wall TDC  Access function: Good with  ml/min    Medications or blood products given: Retacrit    Regular outpatient schedule: [id number]    Summary of response to treatment: Pt tolerated well with HD , vital signs stable, HD completed in full, heparin dwell in TDC, capped and clamped. Cirt Line: Initial Hct: 24.2;   End Profile : A; Refill ( Hct.1 -25.4  subtract Hct 2- 25.3): 0.1 (no Refill); BV: -4.4 %      Copy of dialysis treatment record placed in chart, to be scanned into EMR.     05/24/22 0744 05/24/22 1054   Vital Signs   BP (!) 125/58 128/65   Temp 98.4 °F (36.9 °C) 98.4 °F (36.9 °C)   Pulse 68 75   Resp 18 16   Weight 187 lb 9.8 oz (85.1 kg) 185 lb 3 oz (84 kg)   Weight Method Bed scale Actual;Bed scale   Percent Weight Change -1.28 -1.29   Post-Hemodialysis Assessment   Post-Treatment Procedures  --  Blood returned;Catheter capped, clamped and heparinized x 2 ports   Machine Disinfection Process  --  Acid/Vinegar Clean;Heat Disinfect; Exterior Machine Disinfection   Rinseback Volume (ml)  --  400 ml   Total Liters Processed (l/min)  --  67.7 l/min   Dialyzer Clearance  --  Moderately streaked   Duration of Treatment (minutes)  --  180 minutes   Heparin amount administered during treatment (units)  --  0 units   Hemodialysis Intake (ml)  --  400 ml   Hemodialysis Output (ml)  --  1053 ml   NET Removed (ml)  --  653   Tolerated Treatment  --  Good

## 2022-05-24 NOTE — PROGRESS NOTES
Comprehensive Nutrition Assessment    Type and Reason for Visit:  Reassess    Nutrition Recommendations/Plan:   1. Recommend General diet order, free of therapeutic restrictions, to promote adequate nutrient intake  2. D/c Nepro shakes per pt request  3. Encourage PO intakes, assist with meals  4. Monitor nutrition adequacy, pertinent labs, bowel habits, wt changes, and clinical progress     Malnutrition Assessment:  Malnutrition Status: At risk for malnutrition (Comment) (05/24/22 5832)    Context:  Acute Illness     Findings of the 6 clinical characteristics of malnutrition:  Energy Intake:  75% or less of estimated energy requirements for 7 or more days    Nutrition Assessment:    Follow up: Pt nutritionally improving AEB reporting increased appetite, PO intakes % x3 meals yesterday per EMR. Pt dislikes Nepro shakes and requested to d/c, offered alternate ONS options but pt declined. Continue liberalized diet to promote PO intakes. Will continue to monitor. Nutrition Related Findings:    Labs reviewed. BM 5/22. Wound Type: None       Current Nutrition Intake & Therapies:    Average Meal Intake: %,1-25%,0%  Average Supplements Intake: Refusing to take  ADULT DIET; Regular  ADULT ORAL NUTRITION SUPPLEMENT; Breakfast, Dinner; Renal Oral Supplement    Anthropometric Measures:  Height: 5' 10\" (177.8 cm)  Ideal Body Weight (IBW): 166 lbs (75 kg)       Current Body Weight: 185 lb (83.9 kg), 111.4 % IBW. Weight Source: Bed Scale  Current BMI (kg/m2): 26.5  Usual Body Weight: 200 lb (90.7 kg) (per pt)  % Weight Change (Calculated): -5                    BMI Categories: Overweight (BMI 25.0-29. 9)    Estimated Daily Nutrient Needs:  Energy Requirements Based On: Kcal/kg (25-28)  Weight Used for Energy Requirements: Current (86kg)  Energy (kcal/day): 1180-1040  Weight Used for Protein Requirements: Current (1-1.2)  Protein (g/day):   Method Used for Fluid Requirements: 1 ml/kcal  Fluid (ml/day): 2877-8303    Nutrition Diagnosis:   · Inadequate oral intake related to early satiety as evidenced by poor intake prior to admission,weight loss    Nutrition Interventions:   Food and/or Nutrient Delivery: Continue Current Diet,Discontinue Oral Nutrition Supplement  Nutrition Education/Counseling: Education not appropriate  Coordination of Nutrition Care: Continue to monitor while inpatient  Plan of Care discussed with: Patient, daughter    Goals:  Previous Goal Met: Progressing toward Goal(s)  Goals: PO intake 50% or greater,prior to discharge       Nutrition Monitoring and Evaluation:   Behavioral-Environmental Outcomes: None Identified  Food/Nutrient Intake Outcomes: Food and Nutrient Intake,Supplement Intake  Physical Signs/Symptoms Outcomes: Weight,Biochemical Data    Discharge Planning:    Continue current diet     [address], [address]: [id number]

## 2022-05-24 NOTE — PROGRESS NOTES
Hospitalist Progress Note      PCP: Shyla Myers MD    Date of Admission: 5/17/2022    Chief Complaint: nstemi     Hospital Course: reviewed     Subjective: seen in HD, no complaints, believes valve surgery to happen in the coming months rather than this admission       Medications:  Reviewed    Infusion Medications    sodium chloride      sodium chloride       Scheduled Medications    heparin (porcine)        epoetin katelynn-epbx        levothyroxine  150 mcg Oral Daily    iron sucrose (VENOFER) iv piggyback 100 mL (Admin over 60 minutes)  200 mg IntraVENous Q24H    heparin (porcine)  5,000 Units SubCUTAneous 3 times per day    epoetin katelynn-epbx  10,000 Units IntraVENous Once per day on Tue Thu Sat    megestrol  625 mg Oral Daily    aspirin  81 mg Oral Daily    atorvastatin  20 mg Oral Daily    calcium elemental  500 mg Oral Daily    carvedilol  3.125 mg Oral BID WC    doxazosin  1 mg Oral Daily    sodium chloride flush  5-40 mL IntraVENous 2 times per day     PRN Meds: heparin (porcine), sodium chloride flush, sodium chloride, ondansetron **OR** ondansetron, polyethylene glycol, acetaminophen **OR** acetaminophen, nitroGLYCERIN, sodium chloride      Intake/Output Summary (Last 24 hours) at 5/24/2022 0931  Last data filed at 5/24/2022 8019  Gross per 24 hour   Intake 840 ml   Output 525 ml   Net 315 ml       Physical Exam Performed:    BP (!) 125/58   Pulse 68   Temp 98.4 °F (36.9 °C)   Resp 18   Ht 5' 10\" (1.778 m)   Wt 187 lb 9.8 oz (85.1 kg)   SpO2 94%   BMI 26.92 kg/m²     General appearance: No apparent distress, appears stated age and cooperative. HEENT: Pupils equal, round, and reactive to light. Conjunctivae/corneas clear. Neck: Supple, with full range of motion. No jugular venous distention. Trachea midline. Rt neck Hd line noted  Respiratory:  Normal respiratory effort. Clear to auscultation, bilaterally without Rales/Wheezes/Rhonchi.   Cardiovascular: Regular rate and rhythm with normal S1/S2 without murmurs, rubs or gallops. Abdomen: Soft, non-tender, non-distended with normal bowel sounds. Musculoskeletal: No clubbing, cyanosis or edema bilaterally. Full range of motion without deformity. Skin: Skin color, texture, turgor normal.  No rashes or lesions. Neurologic:  Neurovascularly intact without any focal sensory/motor deficits. Cranial nerves: II-XII intact, grossly non-focal.  Psychiatric: Alert and oriented, thought content appropriate, normal insight  Capillary Refill: Brisk,3 seconds, normal   Peripheral Pulses: +2 palpable, equal bilaterally     Labs:   Recent Labs     05/22/22  0436 05/24/22  0507   WBC 12.0* 12.6*   HGB 8.3* 7.6*   HCT 25.5* 22.9*    382     Recent Labs     05/22/22  0436 05/24/22  0507    137   K 3.9 4.2    102   CO2 27 25   BUN 21* 42*   CREATININE 5.0* 7.4*   CALCIUM 8.5 8.8   PHOS  --  2.9     No results for input(s): AST, ALT, BILIDIR, BILITOT, ALKPHOS in the last 72 hours. No results for input(s): INR in the last 72 hours. No results for input(s): [place] in the last 72 hours. Urinalysis:      Lab Results   Component Value Date    NITRU Negative 05/19/2022    WBCUA 3-5 05/19/2022    BACTERIA Rare 05/25/2018    RBCUA 3-4 05/19/2022    BLOODU SMALL 05/19/2022    SPECGRAV 1.010 05/19/2022    GLUCOSEU Negative 05/19/2022       Radiology:  MRI BRAIN WO CONTRAST   Final Result   1. Small acute to subacute cortical infarct involving the right brachium   pontis/cerebellar hemisphere. 2. No significant mass effect or acute hemorrhage. 3. Disproportionate enlargement of the ventricles relative to the degree of   parenchymal volume loss, which may reflect more central white matter volume   loss versus NPH in the appropriate clinical setting. 4. Chronic small vessel ischemic changes and remote lacunar infarcts   throughout the sadia.          NM Cardiac Stress Test Nuclear Imaging   Final Result      CT HEAD WO CONTRAST Final Result   No acute intracranial abnormality. Generalized cerebral atrophy and chronic small vessel white matter ischemic   changes. RECOMMENDATIONS:   Unavailable         XR CHEST PORTABLE   Final Result   No focal lung consolidation. VL DUP CAROTID BILATERAL    (Results Pending)           Assessment/Plan:    Active Hospital Problems    Diagnosis     Cognitive impairment [R41.89]      Priority: Medium    Abnormal ECG [R94.31]      Priority: Medium    PVC (premature ventricular contraction) [I49.3]      Priority: Medium    Murmur [R01.1]      Priority: Medium    Anemia in other chronic diseases classified elsewhere [D63.8]      Priority: Medium    Weakness [R53.1]      Priority: Medium    Elevated troponin [R77.8]      Priority: Medium    Chronic kidney disease, stage 5 (HCC) [N18.5]     NSTEMI (non-ST elevated myocardial infarction) ([facility] [address] [I21.4]     Anemia of chronic renal failure [N18.9, D63.1]     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      End-stage renal disease-on hemodialysis nephrology consulted and following.      Anemia of chronic kidney disease stable given 1 pack RBC transfusion due to elevated troponin NSTEMI.    -iv iron x 5 doses ordered by nephro(last dose 5/26)     Hypertension controlled continue with current medication.   -off norvasc     Diabetes mellitus type 2 continue with the sliding scale ,  hemoglobin A1c= 5.9 on 5/21/22       Hyponatremia improved.       Cognitive deficit 2/2 acute CVA- neurology

## 2022-05-24 NOTE — PROGRESS NOTES
Speech Language Pathology    SLP attempted f/u for cognitive tx, however pt currently off the floor at .  to continue to follow and re-attempt f/u at a later time.     Zoila Johns M.S. [id number] [facility]  Speech-language pathologist  [id number]  Phone: [phone]

## 2022-05-24 NOTE — PROGRESS NOTES
[facility] Progress Note           Subjective/   66y.o. year old male who we are seeing in consultation for ESKD on HD. HPI:  Last HD on 5/24 with 1.1 liters removed, post-weight of 84 kg.  +weak. ROS:  States appetite slowly improving, no shortness of breath. Objective/   GEN:  Chronically ill, BP (!) 128/53   Pulse 70   Temp 98.2 °F (36.8 °C)   Resp 16   Ht 5' 10\" (1.778 m)   Wt 185 lb 3 oz (84 kg)   SpO2 95%   BMI 26.57 kg/m²   HEENT: non-icteric, no JVD  CV: S1, S2 without m/r/g; no LE edema  RESP: CTA B without w/r/r; breathing wnl  ABD: +bs, soft, nt, no hsm  SKIN: warm, no rashes  ACCESS: R IJ TDC c/d/i    Data/  Recent Labs     05/22/22  0436 05/24/22  0507   WBC 12.0* 12.6*   HGB 8.3* 7.6*   HCT 25.5* 22.9*   MCV 84.3 84.0    382     Recent Labs     05/22/22  0436 05/24/22  0507    137   K 3.9 4.2    102   CO2 27 25   GLUCOSE 112* 128*   PHOS  --  2.9   BUN 21* 42*   CREATININE 5.0* 7.4*   LABGLOM 11* 7*   GFRAA 14* 9*       Assessment/     - End stage kidney disease on HD Tues-Thurs-Sat              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 CT head negative. - Hypothyroidism - on replacement    Plan/     - HD TTS schedule, EDW 85 kg would not challenge too much  - Course of IV iron, VIRAJ with HD  - Off norvasc   - Trend labs, bp's    ____________________________________  Angeline Hill MD  [facility]  [email or web address]  Office: [phone]

## 2022-05-24 NOTE — PROGRESS NOTES
Physical Therapy/Occupational Therapy  Pt refused PT and OT treatment, stating he was too fatigued. Pt refused even simple bed activities and raised his voice to a loud volume to emphasize his refusal.  Pt was assisted  to scoot back into a more comfortable position in bed w/ max A x 2 and was educated on the purpose of PT and OT. RN was present for the exchange. Will continue to follow as able. Thank you.   Ghassan Miranda [facility]

## 2022-05-25 PROCEDURE — 97530 THERAPEUTIC ACTIVITIES: CPT

## 2022-05-25 PROCEDURE — 6370000000 HC RX 637 (ALT 250 FOR IP): Performed by: INTERNAL MEDICINE

## 2022-05-25 PROCEDURE — 97535 SELF CARE MNGMENT TRAINING: CPT

## 2022-05-25 PROCEDURE — 2060000000 HC ICU INTERMEDIATE R&B

## 2022-05-25 PROCEDURE — 6360000002 HC RX W HCPCS: Performed by: INTERNAL MEDICINE

## 2022-05-25 PROCEDURE — 2580000003 HC RX 258: Performed by: INTERNAL MEDICINE

## 2022-05-25 PROCEDURE — 97129 THER IVNTJ 1ST 15 MIN: CPT

## 2022-05-25 PROCEDURE — 51798 US URINE CAPACITY MEASURE: CPT

## 2022-05-25 PROCEDURE — 97110 THERAPEUTIC EXERCISES: CPT

## 2022-05-25 RX ORDER — CLOPIDOGREL BISULFATE 75 MG/1
75 TABLET ORAL DAILY
Status: DISCONTINUED | OUTPATIENT
Start: 2022-05-26 | End: 2022-05-26 | Stop reason: HOSPADM

## 2022-05-25 RX ADMIN — HEPARIN SODIUM 5000 UNITS: 5000 INJECTION INTRAVENOUS; SUBCUTANEOUS at 14:44

## 2022-05-25 RX ADMIN — CARVEDILOL 3.12 MG: 3.12 TABLET, FILM COATED ORAL at 08:32

## 2022-05-25 RX ADMIN — HEPARIN SODIUM 5000 UNITS: 5000 INJECTION INTRAVENOUS; SUBCUTANEOUS at 21:37

## 2022-05-25 RX ADMIN — SODIUM CHLORIDE, PRESERVATIVE FREE 10 ML: 5 INJECTION INTRAVENOUS at 08:41

## 2022-05-25 RX ADMIN — IRON SUCROSE 200 MG: 20 INJECTION, SOLUTION INTRAVENOUS at 16:46

## 2022-05-25 RX ADMIN — SODIUM CHLORIDE, PRESERVATIVE FREE 10 ML: 5 INJECTION INTRAVENOUS at 21:37

## 2022-05-25 RX ADMIN — LEVOTHYROXINE SODIUM 150 MCG: 0.15 TABLET ORAL at 05:27

## 2022-05-25 RX ADMIN — HEPARIN SODIUM 5000 UNITS: 5000 INJECTION INTRAVENOUS; SUBCUTANEOUS at 05:27

## 2022-05-25 RX ADMIN — ATORVASTATIN CALCIUM 20 MG: 10 TABLET, FILM COATED ORAL at 08:33

## 2022-05-25 RX ADMIN — DOXAZOSIN 1 MG: 2 TABLET ORAL at 08:31

## 2022-05-25 RX ADMIN — CALCIUM 500 MG: 500 TABLET ORAL at 08:32

## 2022-05-25 RX ADMIN — MEGESTROL ACETATE 625 MG: 40 SUSPENSION ORAL at 08:33

## 2022-05-25 RX ADMIN — CARVEDILOL 3.12 MG: 3.12 TABLET, FILM COATED ORAL at 16:43

## 2022-05-25 RX ADMIN — ASPIRIN 81 MG 81 MG: 81 TABLET ORAL at 08:31

## 2022-05-25 ASSESSMENT — PAIN SCALES - GENERAL
PAINLEVEL_OUTOF10: 2
PAINLEVEL_OUTOF10: 0

## 2022-05-25 ASSESSMENT — PAIN DESCRIPTION - LOCATION: LOCATION: LEG

## 2022-05-25 NOTE — PLAN OF CARE
Increase patients ADLs/functional status to baseline.
PT eval complete. Increase function to baseline.
Problem: Discharge Planning  Goal: Discharge to home or other facility with appropriate resources  5/18/2022 0807 by Juan A Staley RN  Outcome: Progressing  5/17/2022 2307 by Wiliam White RN  Outcome: Progressing     Problem: Safety - Adult  Goal: Free from fall injury  5/18/2022 0807 by Juan A Staley RN  Outcome: Progressing  5/17/2022 2307 by Wiliam White RN  Outcome: Progressing     Problem: ABCDS Injury Assessment  Goal: Absence of physical injury  5/18/2022 0807 by Juan A Staley RN  Outcome: Progressing  5/17/2022 2307 by Wiliam White RN  Outcome: Progressing
Problem: Discharge Planning  Goal: Discharge to home or other facility with appropriate resources  5/21/2022 0916 by Bryson Benitez RN  Outcome: Progressing  5/20/2022 2348 by Patrick Grullon RN  Outcome: Progressing  Flowsheets  Taken 5/20/2022 2015 by Patrick Grullon RN  Discharge to home or other facility with appropriate resources: Identify barriers to discharge with patient and caregiver  Taken 5/20/2022 1030 by Modesto Swain RN  Discharge to home or other facility with appropriate resources: Identify barriers to discharge with patient and caregiver     Problem: Safety - Adult  Goal: Free from fall injury  5/21/2022 0916 by Bryson Benitez RN  Outcome: Progressing  5/20/2022 2348 by Patrick Grullon RN  Outcome: Progressing  Flowsheets  Taken 5/20/2022 2300 by Patrick Grullon RN  Free From Fall Injury:   Instruct family/caregiver on patient safety   Based on caregiver fall risk screen, instruct family/caregiver to ask for assistance with transferring infant if caregiver noted to have fall risk factors  Taken 5/20/2022 1052 by Modesto Swain RN  Free From Fall Injury: Instruct family/caregiver on patient safety     Problem: ABCDS Injury Assessment  Goal: Absence of physical injury  5/21/2022 0916 by Bryson Benitez RN  Outcome: Progressing  5/20/2022 2348 by Patrick Grullon RN  Outcome: Progressing     Problem: Chronic Conditions and Co-morbidities  Goal: Patient's chronic conditions and co-morbidity symptoms are monitored and maintained or improved  5/21/2022 0916 by Bryson Benitez RN  Outcome: Progressing  5/20/2022 2348 by Patrick Grullon RN  Outcome: Progressing  Flowsheets  Taken 5/20/2022 2015 by Lauren Espinoza 34 - Patient's Chronic Conditions and Co-Morbidity Symptoms are Monitored and Maintained or Improved:   Monitor and assess patient's chronic conditions and comorbid symptoms for stability, deterioration, or improvement   Collaborate with multidisciplinary team to address chronic
Problem: Discharge Planning  Goal: Discharge to home or other facility with appropriate resources  Outcome: Progressing     Problem: Safety - Adult  Goal: Free from fall injury  Outcome: Progressing     Problem: ABCDS Injury Assessment  Goal: Absence of physical injury  Outcome: Progressing
Problem: Discharge Planning  Goal: Discharge to home or other facility with appropriate resources  Outcome: Progressing     Problem: Safety - Adult  Goal: Free from fall injury  Outcome: Progressing     Problem: ABCDS Injury Assessment  Goal: Absence of physical injury  Outcome: Progressing     Problem: Chronic Conditions and Co-morbidities  Goal: Patient's chronic conditions and co-morbidity symptoms are monitored and maintained or improved  Outcome: Progressing     Problem: Nutrition Deficit:  Goal: Optimize nutritional status  Outcome: Progressing     Problem: Skin/Tissue Integrity  Goal: Absence of new skin breakdown  Description: 1. Monitor for areas of redness and/or skin breakdown  2. Assess vascular access sites hourly  3. Every 4-6 hours minimum:  Change oxygen saturation probe site  4. Every 4-6 hours:  If on nasal continuous positive airway pressure, respiratory therapy assess nares and determine need for appliance change or resting period.   Outcome: Progressing
Problem: Discharge Planning  Goal: Discharge to home or other facility with appropriate resources  Outcome: Progressing     Problem: Safety - Adult  Goal: Free from fall injury  Outcome: Progressing     Problem: ABCDS Injury Assessment  Goal: Absence of physical injury  Outcome: Progressing     Problem: Chronic Conditions and Co-morbidities  Goal: Patient's chronic conditions and co-morbidity symptoms are monitored and maintained or improved  Outcome: Progressing     Problem: Nutrition Deficit:  Goal: Optimize nutritional status  Outcome: Progressing     Problem: Skin/Tissue Integrity  Goal: Absence of new skin breakdown  Description: 1. Monitor for areas of redness and/or skin breakdown  2. Assess vascular access sites hourly  3. Every 4-6 hours minimum:  Change oxygen saturation probe site  4. Every 4-6 hours:  If on nasal continuous positive airway pressure, respiratory therapy assess nares and determine need for appliance change or resting period. Outcome: Progressing     Problem: Pain  Goal: Verbalizes/displays adequate comfort level or baseline comfort level  Outcome: Progressing   Reviewed poc today 30 mins.
Problem: Discharge Planning  Goal: Discharge to home or other facility with appropriate resources  Outcome: Progressing  Flowsheets (Taken 5/25/2022 0901)  Discharge to home or other facility with appropriate resources: Identify barriers to discharge with patient and caregiver     Problem: Safety - Adult  Goal: Free from fall injury  Outcome: Progressing     Problem: ABCDS Injury Assessment  Goal: Absence of physical injury  Outcome: Progressing     Problem: Chronic Conditions and Co-morbidities  Goal: Patient's chronic conditions and co-morbidity symptoms are monitored and maintained or improved  Outcome: Progressing  Flowsheets (Taken 5/25/2022 0901)  Care Plan - Patient's Chronic Conditions and Co-Morbidity Symptoms are Monitored and Maintained or Improved: Monitor and assess patient's chronic conditions and comorbid symptoms for stability, deterioration, or improvement     Problem: Nutrition Deficit:  Goal: Optimize nutritional status  Outcome: Progressing     Problem: Skin/Tissue Integrity  Goal: Absence of new skin breakdown  Description: 1. Monitor for areas of redness and/or skin breakdown  2. Assess vascular access sites hourly  3. Every 4-6 hours minimum:  Change oxygen saturation probe site  4. Every 4-6 hours:  If on nasal continuous positive airway pressure, respiratory therapy assess nares and determine need for appliance change or resting period.   Outcome: Progressing     Problem: Pain  Goal: Verbalizes/displays adequate comfort level or baseline comfort level  Outcome: Progressing
vascular access sites hourly  3. Every 4-6 hours minimum:  Change oxygen saturation probe site  4. Every 4-6 hours:  If on nasal continuous positive airway pressure, respiratory therapy assess nares and determine need for appliance change or resting period.   5/22/2022 0835 by Rito Parker RN  Outcome: Progressing  5/21/2022 1930 by Jorge Cline RN  Outcome: Progressing     Problem: Pain  Goal: Verbalizes/displays adequate comfort level or baseline comfort level  5/22/2022 0835 by Rito Parker RN  Outcome: Mic Coker (Taken 5/21/2022 2107 by Jorge Cline RN)  Verbalizes/displays adequate comfort level or baseline comfort level:   Encourage patient to monitor pain and request assistance   Assess pain using appropriate pain scale   Administer analgesics based on type and severity of pain and evaluate response   Implement non-pharmacological measures as appropriate and evaluate response  5/21/2022 1930 by Jorge Cline RN  Outcome: Progressing

## 2022-05-25 NOTE — PROGRESS NOTES
Physical Therapy  Facility/Department: [place] [address] PCU  Daily Treatment Note  NAME: Narciso Ludwig  : 1950  MRN: [id number]    Date of Service: 2022    Discharge Recommendations:  Subacute/Skilled Nursing Facility   PT Equipment Recommendations  Equipment Needed: No  [facility] 6 Clicks Inpatient Mobility:  AM-PAC Mobility Inpatient   How much difficulty turning over in bed?: None  How much difficulty sitting down on / standing up from a chair with arms?: A Little  How much difficulty moving from lying on back to sitting on side of bed?: A Little  How much help from another person moving to and from a bed to a chair?: Total  How much help from another person needed to walk in hospital room?: Total  How much help from another person for climbing 3-5 steps with a railing?: Total  AM-PAC Inpatient Mobility Raw Score : 13  AM-PAC Inpatient T-Scale Score : 36.74  Mobility Inpatient CMS 0-100% Score: 64.91  Mobility Inpatient CMS G-Code Modifier : CL      Patient Diagnosis(es): The primary encounter diagnosis was NSTEMI (non-ST elevated myocardial infarction) ([facility] [address] Diagnoses of Generalized weakness, Anemia, unspecified type, and Hyponatremia were also pertinent to this visit. Assessment   Assessment: Pt demos participation in PT this date. Currently, pt requires cga for bed mobility and  Min/mod A transfer STS, mod A for standing while changing briefs. Pt demos too fatigued for transfer to a chair or ambulation. Pt and spouse educated on benefits for therapy and importance of participation and risk of bed rest. Pt would benefit from continued skilled PT to address current deficits. Recommend SNF upon d/c due to current deficits. Co-tx collaboration this date with OT staff to safely progress pt toward goals.   Activity Tolerance: Treatment limited secondary to decreased cognition;Patient limited by fatigue  Equipment Needed: No     Plan    Plan  Plan: 3-5 times per week  Current Treatment Recommendations: Strengthening;Balance training;Functional mobility training;Transfer training; Endurance training;Neuromuscular re-education;Stair training;Gait training;Pain management;Home exercise program;Safety education & training;Patient/Caregiver education & training; Therapeutic activities     Restrictions  Restrictions/Precautions  Restrictions/Precautions: General Precautions,NPO,Bed Alarm  Position Activity Restriction  Other position/activity restrictions: TDC, NPO pending stress test, IV     Subjective    Subjective  Subjective: Pt agrees to PT session; his wife and dtr present for session  Pain: Pt did not rate pain  Orientation  Overall Orientation Status: Impaired  Orientation Level: Oriented to person;Disoriented to place; Disoriented to situation;Disoriented to time (able to state name but not birth date)  Cognition  Overall Cognitive Status: Exceptions  Arousal/Alertness: Delayed responses to stimuli;Inconsistent responses to stimuli  Following Commands: Inconsistently follows commands  Attention Span: Attends with cues to redirect  Memory: Decreased recall of recent events;Decreased short term memory  Safety Judgement: Decreased awareness of need for assistance;Decreased awareness of need for safety  Insights: Decreased awareness of deficits  Initiation: Requires cues for all     Objective   Vitals  Heart Rate: 65  BP: 134/60  BP Location: Left upper arm  MAP (Calculated): 84.67  SpO2: 92 %  O2 Device: None (Room air)  Bed Mobility Training  Bed Mobility Training: Yes (Simultaneous filing.  User may not have seen previous data.)  Interventions: Verbal cues  Rolling: Contact-guard assistance  Supine to Sit: cga  Sit to Supine: cga  Scooting: Minimum assistance  Balance  Sitting: Impaired (initially CGA, became fatigued with forward flexed posture, difficulty lifting head and insistent on laying down, occasional mod A)  Sitting - Static: Poor (constant support)  Sitting - Dynamic: Poor (constant support)  Standing: Impaired (min to mod A)  Standing - Static: Constant support  Standing - Dynamic:  (unable to balance for dynamic standing)  Transfer Training  Transfer Training: Yes  Interventions: Safety awareness training;Verbal cues; Visual cues  Sit to Stand: Contact-guard assistance  Stand to Sit: Contact-guard assistance     PT Exercises  Exercise Treatment: performed BLE TE 10-15X EACH: AP, SAQ, HR, SLR, HIP ABD,   Transf sit to stand 3xs. I staitc stand while changing briefs mod A for stand balance for 45'\". Safety Devices  Type of Devices: Left in bed;Bed alarm in place;Call light within reach;Nurse notified;Gait belt       Goals  Long Term Goals  Time Frame for Long term goals : 7 days (5/27/22) unless otherwise stated  Long term goal 1: Pt will perform bed mobility with supervision  -5/25 cga  Long term goal 2: Pt will perform transfer with LRAD and supervision   -5/25 min/mod A STS  Long term goal 3: Pt will ambulate 50 ft with LRAD and supervision   -5/25 SOFIA  Long term goal 4: Pt will perform 5 steps with HR and CGA   -5/25 SOFIA  Long term goal 5: Pt will perfrom 12-15 reps of BLE exercises to improve strength and mobility by 5/23   -5/25 initiated  Patient Goals   Patient goals : \"to go home\"    Education  Patient Education  Education Given To: Patient; Family  Education Provided: Role of Therapy;Plan of Care  Education Provided Comments: Pt educated on importance of continued mobility, risk of bed rest and deconditioning.  will require reinforcement  Education Method: Demonstration;Verbal  Barriers to Learning: None  Education Outcome: Verbalized understanding;Demonstrated understanding;Continued education needed    Therapy Time   Individual Concurrent Group Co-treatment   Time In 0955         Time Out 1033         Minutes 38         Timed Code Treatment Minutes: 1440 [facility]

## 2022-05-25 NOTE — PROGRESS NOTES
[facility] Progress Note           Subjective/   66y.o. year old male who we are seeing in consultation for ESKD on HD. HPI:  Last HD on 5/24 with 1.1 liters removed, post-weight of 84 kg.  +weak. ROS:  States appetite slowly improving, no shortness of breath. Objective/   GEN:  Chronically ill, /63   Pulse 65   Temp 97.5 °F (36.4 °C) (Oral)   Resp 16   Ht 5' 10\" (1.778 m)   Wt 188 lb 7.9 oz (85.5 kg)   SpO2 92%   BMI 27.05 kg/m²   HEENT: non-icteric, no JVD  CV: S1, S2 without m/r/g; no LE edema  RESP: CTA B without w/r/r; breathing wnl  ABD: +bs, soft, nt, no hsm  SKIN: warm, no rashes  ACCESS: R IJ TDC c/d/i    Data/  Recent Labs     05/24/22  0507   WBC 12.6*   HGB 7.6*   HCT 22.9*   MCV 84.0        Recent Labs     05/24/22  0507      K 4.2      CO2 25   GLUCOSE 128*   PHOS 2.9   BUN 42*   CREATININE 7.4*   LABGLOM 7*   GFRAA 9*       Assessment/     - End stage kidney disease on HD Tues-Thurs-Sat              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 CT head negative. - Hypothyroidism - on replacement    Plan/     - HD TTS schedule, EDW 85 kg would not challenge too much  - Course of IV iron, VIRAJ with HD  - Off norvasc   - Trend labs, bp's    ____________________________________  Alejandro Galdamez MD  [facility]  [email or web address]  Office: [phone]

## 2022-05-25 NOTE — PROGRESS NOTES
Soft, non-tender, non-distended with normal bowel sounds. Musculoskeletal: No clubbing, cyanosis or edema bilaterally.  Full range of motion without deformity. Skin: Skin color, texture, turgor normal.  No rashes or lesions. Neurologic:  Neurovascularly intact without any focal sensory/motor deficits. Cranial nerves: II-XII intact, grossly non-focal.  Psychiatric: Alert and oriented, thought content appropriate, normal insight  Capillary Refill: Brisk,3 seconds, normal   Peripheral Pulses: +2 palpable, equal bilaterally       Labs:   Recent Labs     05/24/22  0507   WBC 12.6*   HGB 7.6*   HCT 22.9*        Recent Labs     05/24/22  0507      K 4.2      CO2 25   BUN 42*   CREATININE 7.4*   CALCIUM 8.8   PHOS 2.9     No results for input(s): AST, ALT, BILIDIR, BILITOT, ALKPHOS in the last 72 hours. No results for input(s): INR in the last 72 hours. No results for input(s): Gonz[place]lez Belle in the last 72 hours. Urinalysis:      Lab Results   Component Value Date    NITRU Negative 05/19/2022    WBCUA 3-5 05/19/2022    BACTERIA Rare 05/25/2018    RBCUA 3-4 05/19/2022    BLOODU SMALL 05/19/2022    SPECGRAV 1.010 05/19/2022    GLUCOSEU Negative 05/19/2022       Radiology:  VL DUP CAROTID BILATERAL   Final Result      MRI BRAIN WO CONTRAST   Final Result   1. Small acute to subacute cortical infarct involving the right brachium   pontis/cerebellar hemisphere. 2. No significant mass effect or acute hemorrhage. 3. Disproportionate enlargement of the ventricles relative to the degree of   parenchymal volume loss, which may reflect more central white matter volume   loss versus NPH in the appropriate clinical setting. 4. Chronic small vessel ischemic changes and remote lacunar infarcts   throughout the sadia. NM Cardiac Stress Test Nuclear Imaging   Final Result      CT HEAD WO CONTRAST   Final Result   No acute intracranial abnormality.       Generalized cerebral atrophy and chronic small vessel white matter ischemic   changes. RECOMMENDATIONS:   Unavailable         XR CHEST PORTABLE   Final Result   No focal lung consolidation. Assessment/Plan:    Active Hospital Problems    Diagnosis     Cognitive impairment [R41.89]      Priority: Medium    Abnormal ECG [R94.31]      Priority: Medium    PVC (premature ventricular contraction) [I49.3]      Priority: Medium    Murmur [R01.1]      Priority: Medium    Anemia in other chronic diseases classified elsewhere [D63.8]      Priority: Medium    Weakness [R53.1]      Priority: Medium    Elevated troponin [R77.8]      Priority: Medium    Chronic kidney disease, stage 5 (HCC) [N18.5]     NSTEMI (non-ST elevated myocardial infarction) ([facility] [address] [I21.4]     Anemia of chronic renal failure [N18.9, D63.1]     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      End-stage renal disease-on hemodialysis nephrology consulted and following.      Anemia of chronic kidney disease stable given 1 pack RBC transfusion due to elevated troponin NSTEMI.    -iv iron x 5 doses ordered by nephro(last dose 5/26)      Hypertension controlled continue with current medication.   -off norvasc      Diabetes mellitus type 2 continue with the sliding scale ,  hemoglobin A1c= 5.9 on 5/21/22       Hyponatremia improved.       Cognitive deficit 2/2 acute CVA- neurology consulted, neurology consult appreciated   -MRI of the brain performed(noted smal acute /subacute cortical infarct in rt brachium pontis/cerebellar hemisphere)  Continued with aspirin, statin, physical Occupational Therapy.   -per wife/pt, they have been compliant with asa therapy   -spoke to neuro on 5/25, switch to plavix    Hypothyroid- with Elevated TSH level, borderline low normal free T4 on 5/17/2022. inc'd synthroid to 150mcg daily from 137mcg       DVT Prophylaxis: heparin sq  Diet: ADULT DIET;  Regular  Code Status: Full Code      PT/OT Eval Status: rec SNF     Dispo - pending possible placement, possibly thurs after last dose of iv iron    Minesh Valverde MD

## 2022-05-25 NOTE — CARE COORDINATION
Shira at [facility] accepted patient pending updated OT/OT notes. Once those are available in Epic for review they will arrange transportation to and from HD for patient. If meets criteria based on AMPAC can go floor to SNF. If AMPAC not within range will need pre-cert. PT attempted to see patient yesterday and he was too tired after HD to work with them. PT is going to work with patient this AM. Wife present in room. Will continue to follow and assist with d/c to SNF once above has been resolved and medically cleared by MD.    Addendum: Per Shira [facility] is working on stretcher transport to and from HD based on current therapy notes and how weak patient is. This is a potential barrier and they may have to consider an alternate facility if [facility] cannot accommodate. Wife in room will discuss with her.

## 2022-05-25 NOTE — CARE COORDINATION
Per Shiv Cummings at [facility] they can accept. Patient meets criteria for floor to SNF but they still have to notify [facility] of admission. Plan is for D/C to [facility] tomorrow after HD. Wife agreeable to plan.

## 2022-05-25 NOTE — PROGRESS NOTES
Occupational Therapy  Facility/Department: [facility] C4 PCU  Daily Treatment Note  NAME: Nitin Rogers  : 1950  MRN: [id number]    Date of Service: 2022    Discharge Recommendations:  Subacute/Skilled Nursing Facility       AM-PAC Daily Activity Inpatient   How much help for putting on and taking off regular lower body clothing?: A Lot  How much help for Bathing?: A Lot  How much help for Toileting?: A Lot  How much help for putting on and taking off regular upper body clothing?: A Lot  How much help for taking care of personal grooming?: A Lot  How much help for eating meals?: A Little  AM-PAC Inpatient Daily Activity Raw Score: 13  AM-PAC Inpatient ADL T-Scale Score : 32.03  ADL Inpatient CMS 0-100% Score: 63.03  ADL Inpatient CMS G-Code Modifier : CL      Patient Diagnosis(es): The primary encounter diagnosis was NSTEMI (non-ST elevated myocardial infarction) ([facility] [address] Diagnoses of Generalized weakness, Anemia, unspecified type, and Hyponatremia were also pertinent to this visit. Assessment    Assessment: Pt with fair tolerance of OT treatment. Pt requires VCs for initation and encouragement for participation throughout. Pt demos cognitive deficits and disorientation limiting pt ability to verbalize understanding of situation and need for therapy participation. Pt sat EOB with forward flexed posture, able to complete 2 sit <-> stand from EOB with grossly min A, requires total A to change brief in standing and mod A to maintain standing balance due to fatigue. Pt insistent on returning to bed, unsafe to put in chair given level of fatigue sitting EOB, returned supine for therex. Pt would benefit from continued skilled OT in SNF setting at d/c.   Activity Tolerance: Treatment limited secondary to decreased cognition;Patient limited by fatigue  Discharge Recommendations: [address]  Times per Week: 2-3x/wk Restrictions  Restrictions/Precautions  Restrictions/Precautions: General Precautions, Bed Alarm      Subjective   Subjective  Subjective: Pt resting in bed at approach, agreeable to OT treatment with encouragement. Spouse and daughter present. Pain: Pt denies pain. Orientation  Overall Orientation Status: Impaired  Orientation Level: Oriented to person;Disoriented to place; Disoriented to situation;Disoriented to time (able to state name but not birth date)    Cognition  Overall Cognitive Status: Exceptions  Arousal/Alertness: Delayed responses to stimuli;Inconsistent responses to stimuli  Following Commands: Inconsistently follows commands  Attention Span: Attends with cues to redirect  Memory: Decreased recall of recent events;Decreased short term memory  Safety Judgement: Decreased awareness of need for assistance;Decreased awareness of need for safety  Insights: Decreased awareness of deficits  Initiation: Requires cues for all        Objective    Vitals  Vitals  Heart Rate: 83  Heart Rate Source: Monitor  BP: (!) 136/55  BP Location: Left upper arm  BP Method: Automatic  Patient Position: Sitting  MAP (Calculated): 82  SpO2: 97 %  O2 Device: None (Room air)    Bed Mobility Training  Bed Mobility Training: Yes   Interventions: Verbal cues  Supine to Sit: CGA  Sit to Supine: CGA    Balance  Sitting: Impaired (initially CGA, became fatigued with forward flexed posture, difficulty lifting head and insistent on laying down, occasional mod A)  Sitting - Static: Fair/Poor (constant support)  Sitting - Dynamic: Fair/Poor (constant support)   Standing: Impaired   Standing - Static: Fair/Poor (pt fatigues quickly in stance requiring min-mod A with RW)    Transfer Training  Transfer Training: Yes  Interventions: Safety awareness training;Verbal cues; Visual cues  Sit to Stand: min A  Stand to Sit: min-mod A     OT Exercises  A/AROM Exercises: Pt performed the following UE therex bed level x10 reps: shoulder flex/extension, horizontal aBd/aDduction, elbow flex/extension, forearm pronation/supination, grasp/release (pt with poor attention requires cues to follow through and participate, does not open eyes to view form or cues)  Dynamic Sitting Balance Exercises: pt completed reaching activity seated EOB with hand over hand for participation     Safety Devices  Type of Devices: Left in bed;Bed alarm in place;Call light within reach;Nurse notified;Gait belt     Patient Education  Education Given To: Patient  Education Provided: Role of Therapy;Plan of Care;Home Exercise Program;Transfer Training  Education Method: Demonstration;Verbal  Barriers to Learning: Cognition  Education Outcome: Continued education needed    Goals  Short Term Goals  Time Frame for Short term goals: 1wk (5/27/22)  Short Term Goal 1: Pt will complete LE dressing with SBA  Short Term Goal 2: Pt will complete functional t/fs with CGA  Short Term Goal 3: Pt will complete 1-2 grooming tasks in stance at sink with SBA by 5/24-- GOAL NOT MET, pt with poor standing endurance, stood less than 2 mins from EOB with RW and max encouragement with mod A and RW for brief change 5/25/22  Short Term Goal 4: Pt will complete 5x15-20 reps BUE therex for increase in functional strength  Patient Goals   Patient goals : pt unwilling to state at this time       Therapy Time   Individual Concurrent Group Co-treatment   Time In 0957         Time Out 1035         Minutes [address] [facility]

## 2022-05-25 NOTE — PROGRESS NOTES
Speech Language Pathology  Facility/Department: [facility] C4 PCU  Speech/Language/Cog Daily Treatment Note      Pt needs ongoing speech/language/cog assessment to further establish strengths & weaknesses, goals for POC. Today's session largely spent on education with pt's family members present. Continued ST recommended after d/c. NAME: Chris Warren  : 1950  MRN: [id number]    Patient Diagnosis(es):   Patient Active Problem List    Diagnosis Date Noted    Iron deficiency anemia     Acute infective pericarditis     Cognitive impairment     Abnormal ECG     PVC (premature ventricular contraction)     Murmur     Anemia in other chronic diseases classified elsewhere     Weakness 2022    Elevated troponin 2022    NSTEMI (non-ST elevated myocardial infarction) ([facility] [address] 2018    Chronic kidney disease, stage 5 ([facility] [address] 2018    Anemia of chronic renal failure 2018    Crescentic glomerulonephritis 2018    Sepsis ([facility] [address] 2018    ARF (acute renal failure) ([facility] [address] 2018    Pulmonary nodule     Type 2 diabetes mellitus with renal complication ([facility] [address]     Essential hypertension 2018    Hypothyroidism 2018    Mixed hyperlipidemia 2018    Tobacco use disorder 2018    Hyponatremia 2018     Allergies: No Known Allergies     Subjective: Pt seen lying in bed, eyes closed. Pt's family members at bedside. Pain: pt denied    Current Diet: ADULT DIET; Regular    Dysphagia Treatment and Impressions:  Pt being seen for speech/lang/cog tx only. No reports of dysphagia per chart review, no hx of dysphagia. Pt's family members at bedside deny dysphagia. Dysphagia Goals: n/a     Speech/Language/Cog Treatment and Impressions  Per chart and family report, pt seen by PT/OT earlier this date. They endorsed new onset cognitive changes, word finding difficulties at times.   Pt's family also reported frequent frustration d/t communication difficulties, I.e. pt's family member stated that while working with PT/OT earlier, pt was able to state \"hospital\" for place/orientation, however, unable to provide name stating \"!\" at loud volume. SLP encouraged continued ST after d/c to further assess speech/lang/cog, review of compensatory strategies for improved verbal expression. Pt woke to SLP's voice, however, only briefly will open eyes despite cues & encouragement. Pt's wife reported pt sleeps \"a lot during the day\". He successfully relayed his last name to SLP (required cues for repetition x1 d/t low volume; all other responses from pt were intelligible although 1-2 word responses only), stated \"no\" when asked about pain\". He did not provide response to SLP when asked to name family members/their relation who were present in room despite encouragement & repetition. Pt's family reported pt's hobby is cars/car shows. SLP asked pt if he had attended a car show recently, pt stated \"no\". Pt shook head indicating \"no\" when asked whether he had any questions, needed anything I.e. food/drink. **Of note, pt's family reported that pt provides inconsistent responses to questions at times, I.e. place/hospital.  SLP encouraged verbal expression as able when pt is alert/agreeable (vs not attempted to speak / not \"working on language\"). Pt needs ongoing speech/language/cog assessment to further establish strengths & weaknesses, goals for POC. Today's session largely spent on education with pt's family members present. They verbalized understanding, receptive and in agreement to continued ST after d/c.  ST to continue to follow. Speech/Language/Cog Goals:   Long-term goal (10 days, 6/1/22)  Goal 1: Patient will participate in ongoing cognitive-linguistic evaluation and treatment.  05/25: ongoing, needs continued assessment    Short-term Goals (7 days, 5/29/22)  Goal 1: Patient will demonstrate recall of novel or functional information following short delays at 60% accuracy for increased STM. 05/25: did not directly target  Goal 2: Patient will improve orientation to 75% accuracy independently. 05/25: indirectly targeted during session, see above  Goal 3: Patient will demonstrate attention and processing at 75% accuracy in simple conversation or functional tasks given MIN verbal cues. 05/25: indirectly targeted during session, see above. Patient/Family/Caregiver Education:  See above    Plan:    Continued Dysphagia treatment with goals per plan of care. Discharge Recommendations: Continued ST recommended after d/c    If pt discharges from hospital prior to Speech/Swallowing discharge, this note serves as tx and discharge summary.      Total Treatment Time / Charges     Time in Time out Total Time / units   Cognitive Tx  1115 1130 15 min / 1 unit   Speech Tx      Dysphagia Tx        Signature:   LARS [facility]  Speech-language pathologist  [id number]  Phone: [phone]

## 2022-05-25 NOTE — CARE COORDINATION
[facility] cannot accept . Per Shira they can not get scheduled transport for HD. Discussed [facility] as a back up plan. Wife was agreeable to this. Spoke with Breezy Ochoa in admissions at [facility] and she states they have no skilled beds and no available spots at [facility]. Referral then made to [facility] who also takes [facility] to see if they can accommodate transport to [facility] for outpt HD. The barrier is patient is so weak he is requiring a stretcher transport . If [facility] cannot accept may have to look at facilities farther away from [place] [address] that can do in house HD. Will follow for [facility] decision to see if they can accept. Currently meets floor to SNF with [facility] and [facility] participates in this program.     Addendum: Checked with [facility] and they no longer accept [facility]. Referral also sent to [facility] that does onsite HD but it they are not with Saad Livers. If [facility] cannot find transport may have to consider [place]. Wife aware and agreeable.

## 2022-05-25 NOTE — TELEPHONE ENCOUNTER
05/25-Called ([phone]) spoke to pts wife, she stated pt was still in-house at the hospital. Next available is w/[facility] on 06/21, pt's wife stated that was a dialysis day. Please advise.

## 2022-05-26 VITALS
BODY MASS INDEX: 27.81 KG/M2 | RESPIRATION RATE: 16 BRPM | WEIGHT: 194.22 LBS | SYSTOLIC BLOOD PRESSURE: 128 MMHG | OXYGEN SATURATION: 94 % | HEIGHT: 70 IN | DIASTOLIC BLOOD PRESSURE: 61 MMHG | HEART RATE: 66 BPM | TEMPERATURE: 97.7 F

## 2022-05-26 LAB
ALBUMIN SERPL-MCNC: 2.3 G/DL (ref 3.4–5)
ANION GAP SERPL CALCULATED.3IONS-SCNC: 12 MMOL/L (ref 3–16)
BUN BLDV-MCNC: 37 MG/DL (ref 7–20)
CALCIUM SERPL-MCNC: 8.9 MG/DL (ref 8.3–10.6)
CHLORIDE BLD-SCNC: 101 MMOL/L (ref 99–110)
CO2: 24 MMOL/L (ref 21–32)
CREAT SERPL-MCNC: 6.7 MG/DL (ref 0.8–1.3)
GFR AFRICAN AMERICAN: 10
GFR NON-AFRICAN AMERICAN: 8
GLUCOSE BLD-MCNC: 123 MG/DL (ref 70–99)
HCT VFR BLD CALC: 24.3 % (ref 40.5–52.5)
HEMOGLOBIN: 7.8 G/DL (ref 13.5–17.5)
MCH RBC QN AUTO: 27.5 PG (ref 26–34)
MCHC RBC AUTO-ENTMCNC: 32.3 G/DL (ref 31–36)
MCV RBC AUTO: 85.3 FL (ref 80–100)
PDW BLD-RTO: 16.3 % (ref 12.4–15.4)
PHOSPHORUS: 3.2 MG/DL (ref 2.5–4.9)
PLATELET # BLD: 370 K/UL (ref 135–450)
PMV BLD AUTO: 7.1 FL (ref 5–10.5)
POTASSIUM SERPL-SCNC: 4.7 MMOL/L (ref 3.5–5.1)
RBC # BLD: 2.85 M/UL (ref 4.2–5.9)
SARS-COV-2, NAAT: NOT DETECTED
SODIUM BLD-SCNC: 137 MMOL/L (ref 136–145)
WBC # BLD: 13.7 K/UL (ref 4–11)

## 2022-05-26 PROCEDURE — 87635 SARS-COV-2 COVID-19 AMP PRB: CPT

## 2022-05-26 PROCEDURE — 85027 COMPLETE CBC AUTOMATED: CPT

## 2022-05-26 PROCEDURE — 6360000002 HC RX W HCPCS: Performed by: INTERNAL MEDICINE

## 2022-05-26 PROCEDURE — 6370000000 HC RX 637 (ALT 250 FOR IP): Performed by: INTERNAL MEDICINE

## 2022-05-26 PROCEDURE — 51798 US URINE CAPACITY MEASURE: CPT

## 2022-05-26 PROCEDURE — 90935 HEMODIALYSIS ONE EVALUATION: CPT

## 2022-05-26 PROCEDURE — 2580000003 HC RX 258: Performed by: INTERNAL MEDICINE

## 2022-05-26 PROCEDURE — 36415 COLL VENOUS BLD VENIPUNCTURE: CPT

## 2022-05-26 PROCEDURE — 80069 RENAL FUNCTION PANEL: CPT

## 2022-05-26 RX ORDER — MEGESTROL ACETATE 40 MG/ML
625 SUSPENSION ORAL DAILY
Qty: 240 ML | Refills: 1
Start: 2022-05-26

## 2022-05-26 RX ORDER — DOXAZOSIN MESYLATE 1 MG/1
1 TABLET ORAL DAILY
Qty: 30 TABLET | Refills: 0
Start: 2022-05-26

## 2022-05-26 RX ORDER — CLOPIDOGREL BISULFATE 75 MG/1
75 TABLET ORAL DAILY
Qty: 30 TABLET | Refills: 1
Start: 2022-05-26

## 2022-05-26 RX ORDER — HEPARIN SODIUM 1000 [USP'U]/ML
INJECTION, SOLUTION INTRAVENOUS; SUBCUTANEOUS
Status: DISCONTINUED
Start: 2022-05-26 | End: 2022-05-26 | Stop reason: HOSPADM

## 2022-05-26 RX ADMIN — ALTEPLASE 2 MG: 2.2 INJECTION, POWDER, LYOPHILIZED, FOR SOLUTION INTRAVENOUS at 12:42

## 2022-05-26 RX ADMIN — IRON SUCROSE 200 MG: 20 INJECTION, SOLUTION INTRAVENOUS at 15:40

## 2022-05-26 RX ADMIN — CLOPIDOGREL BISULFATE 75 MG: 75 TABLET ORAL at 13:38

## 2022-05-26 RX ADMIN — CALCIUM 500 MG: 500 TABLET ORAL at 13:39

## 2022-05-26 RX ADMIN — LEVOTHYROXINE SODIUM 150 MCG: 0.15 TABLET ORAL at 05:15

## 2022-05-26 RX ADMIN — DOXAZOSIN 1 MG: 2 TABLET ORAL at 13:39

## 2022-05-26 RX ADMIN — HEPARIN SODIUM 5000 UNITS: 5000 INJECTION INTRAVENOUS; SUBCUTANEOUS at 14:27

## 2022-05-26 RX ADMIN — MEGESTROL ACETATE 625 MG: 40 SUSPENSION ORAL at 13:39

## 2022-05-26 RX ADMIN — HEPARIN SODIUM 5000 UNITS: 5000 INJECTION INTRAVENOUS; SUBCUTANEOUS at 05:15

## 2022-05-26 RX ADMIN — EPOETIN ALFA-EPBX 10000 UNITS: 10000 INJECTION, SOLUTION INTRAVENOUS; SUBCUTANEOUS at 10:29

## 2022-05-26 RX ADMIN — ATORVASTATIN CALCIUM 20 MG: 10 TABLET, FILM COATED ORAL at 13:38

## 2022-05-26 RX ADMIN — CARVEDILOL 3.12 MG: 3.12 TABLET, FILM COATED ORAL at 13:39

## 2022-05-26 ASSESSMENT — PAIN SCALES - GENERAL
PAINLEVEL_OUTOF10: 0
PAINLEVEL_OUTOF10: 0

## 2022-05-26 NOTE — PROGRESS NOTES
Occupational Therapy  Attempted OT tx. Pt is at dialysis and is scheduled to d/c today.    Juliol Record OT

## 2022-05-26 NOTE — FLOWSHEET NOTE
05/26/22 0832 05/26/22 1146   Vital Signs   /61 (!) 138/57   Temp 98 °F (36.7 °C) 97.7 °F (36.5 °C)   Heart Rate 71 78   Resp 16 16   Weight 194 lb 7.1 oz (88.2 kg) 194 lb 3.6 oz (88.1 kg)   Weight Method Bed scale Bed scale     Treatment time: 3hr    Net UF: 1.1L    Pre weight: 88.2kg  Post weight: 88.1kg  EDW: 85kg    Access used:  [facility]  Access function: Poor    Medications or blood products given: Retacrit 10,000 units, Cathflo 2mg each port to dwell until next treatment. Regular outpatient schedule: [facility]    Summary of response to treatment: Good    Copy of dialysis treatment record placed in chart, to be scanned into EMR.

## 2022-05-26 NOTE — PROGRESS NOTES
Tried to call report to [place]. [facility]. RN asked me to hold and then the call disconnected.

## 2022-05-26 NOTE — CARE COORDINATION
[facility] accepted and can take floor to SNF after HD today. Rapid covid ordered and pending. Transportation arranged for 4:30 PM with [facility]. Meets criteria for floor to SNF [facility] for [facility].

## 2022-05-26 NOTE — PROGRESS NOTES
[facility] Progress Note           Subjective/   66y.o. year old male who we are seeing in consultation for ESKD on HD. HPI:  Last HD on 5/26 with 1.1 liters removed, post-weight of 88.1 kg. Ran at lower blood flows. +weak. ROS:  States appetite slowly improving, no shortness of breath. Objective/   GEN:  Chronically ill, /61   Pulse 66   Temp 97.7 °F (36.5 °C)   Resp 16   Ht 5' 10\" (1.778 m)   Wt 194 lb 3.6 oz (88.1 kg)   SpO2 94%   BMI 27.87 kg/m²   HEENT: non-icteric, no JVD  CV: S1, S2 without m/r/g; no LE edema  RESP: CTA B without w/r/r; breathing wnl  ABD: +bs, soft, nt, no hsm  SKIN: warm, no rashes  ACCESS: R IJ TDC c/d/i    Data/  Recent Labs     05/24/22  0507 05/26/22  0443   WBC 12.6* 13.7*   HGB 7.6* 7.8*   HCT 22.9* 24.3*   MCV 84.0 85.3    370     Recent Labs     05/24/22  0507 05/26/22  0443    137   K 4.2 4.7    101   CO2 25 24   GLUCOSE 128* 123*   PHOS 2.9 3.2   BUN 42* 37*   CREATININE 7.4* 6.7*   LABGLOM 7* 8*   GFRAA 9* 10*       Assessment/     - End stage kidney disease on HD Tues-Thurs-Sat              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 CT head negative.     - Hypothyroidism - on replacement    Plan/     - HD TTS schedule, EDW 85 kg would not challenge too much, suspect bed weights inaccurate, tPA dwell until next HD  - Course of IV iron, VIRAJ with HD  - Off norvasc   - Trend labs, bp's    Okay for discharge  Notified HD unit of return    ____________________________________  Taye Garcia MD  [facility]

## 2022-05-26 NOTE — DISCHARGE INSTR - COC
Continuity of Care Form    Patient Name: Suzanne Agosto   :  1950  MRN:  [id number]    Admit date:  2022  Discharge date:  2022    Code Status Order: Full Code   Advance Directives:      Admitting Physician:  Marcos Dye MD  PCP: Sonia Chappell MD    Discharging Nurse: [facility] Unit/Room#: 6671/8247-75  Discharging Unit Phone Number: [phone]    Emergency Contact:   Extended Emergency Contact Information  Primary Emergency Contact: Yulia Mcneal  Address: [address] [place], [address] [place] [address] Phone: [phone]  Mobile Phone: [phone]  Relation: Spouse  Secondary Emergency Contact: [address] [facility]  Phone: [phone]  Relation: Child    Past Surgical History:  No past surgical history on file.     Immunization History:   Immunization History   Administered Date(s) Administered    COVID-19, Pfizer Purple top, DILUTE for use, 12+ yrs, 30mcg/0.3mL dose 2021, 2021, 10/03/2021    Influenza, High Dose (Fluzone 65 yrs and older) 11/10/2015, 11/15/2016, 2017    Pneumococcal Conjugate 13-valent (Sonda Nim) 12/15/2014       Active Problems:  Patient Active Problem List   Diagnosis Code    Type 2 diabetes mellitus with renal complication ([facility] N24.60    Essential hypertension I10    Hypothyroidism E03.9    Mixed hyperlipidemia E78.2    Tobacco use disorder F17.200    Hyponatremia E87.1    Acute infective pericarditis I30.1    Pulmonary nodule R91.1    Sepsis ([facility] A41.9    ARF (acute renal failure) ([facility]) N17.9    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    Anemia of chronic renal failure N18.9, D63.1    NSTEMI (non-ST elevated myocardial infarction) ([facility] I21.4    Chronic kidney disease, stage 5 ([facility]) N18.5    Iron deficiency anemia D50.9    Weakness R53.1    Elevated troponin R77.8    Abnormal ECG R94.31    PVC (premature ventricular contraction) I49.3    Murmur R01.1    Anemia in other chronic diseases classified elsewhere D63.8    Cognitive impairment R41.89       Isolation/Infection:   Isolation            No Isolation          Patient Infection Status       None to display            Nurse Assessment:  Last Vital Signs: BP (!) 138/57   Pulse 78   Temp 97.7 °F (36.5 °C)   Resp 16   Ht 5' 10\" (1.778 m)   Wt 194 lb 3.6 oz (88.1 kg)   SpO2 92%   BMI 27.87 kg/m²     Last documented pain score (0-10 scale): Pain Level: 0  Last Weight:   Wt Readings from Last 1 Encounters:   05/26/22 194 lb 3.6 oz (88.1 kg)     Mental Status:  alert and Confused     IV Access:  - Dialysis Catheter  - site  [facility] IV Access [id number]}, insertion date: . Nursing Mobility/ADLs:  Walking   Dependent  Transfer  Dependent  Bathing  Dependent  Dressing  Dependent  Toileting  Dependent  Feeding  Independent  Med Admin  Assisted  Med Delivery   whole    Wound Care Documentation and Therapy:        Elimination:  Continence: Bowel: No  Bladder: No  Urinary Catheter:  Anuric     Colostomy/Ileostomy/Ileal Conduit: No       Date of Last BM: 5/24/22    Intake/Output Summary (Last 24 hours) at 5/26/2022 1332  Last data filed at 5/26/2022 1146  Gross per 24 hour   Intake 1600 ml   Output 540 ml   Net 1060 ml     I/O last 3 completed shifts: In: 240 [P.O.:240]  Out: 40 [Urine:40]    Safety Concerns: At Risk for Falls    Impairments/Disabilities:      None    Nutrition Therapy:  Current Nutrition Therapy:   - Oral Diet:  General    Routes of Feeding: Oral  Liquids: Thin Liquids  Daily Fluid Restriction: no  Last Modified Barium Swallow with Video (Video Swallowing Test): not done    Treatments at the Time of Hospital Discharge:   Respiratory Treatments: NA   Oxygen Therapy:  is not on home oxygen therapy.   Ventilator:    - No ventilator support    Rehab Therapies: Physical Therapy, Occupational Therapy, and Speech/Language Therapy  Weight Bearing Status/Restrictions: No weight bearing restrictions  Other Medical Equipment (for information only,

## 2022-05-26 NOTE — DISCHARGE SUMMARY
Hospital Medicine Discharge Summary    Patient ID: Ally Serrano      Patient's PCP: cSar Echevarria MD    Admit Date: 5/17/2022     Discharge Date: 5/26/2022      Admitting Provider: Yesenia Slaughter MD     Discharge Provider: Mak Pineda MD     Discharge Diagnoses: Active Hospital Problems    Diagnosis     Cognitive impairment [R41.89]      Priority: Medium    Abnormal ECG [R94.31]      Priority: Medium    PVC (premature ventricular contraction) [I49.3]      Priority: Medium    Murmur [R01.1]      Priority: Medium    Anemia in other chronic diseases classified elsewhere [D63.8]      Priority: Medium    Weakness [R53.1]      Priority: Medium    Elevated troponin [R77.8]      Priority: Medium    Chronic kidney disease, stage 5 (HCC) [N18.5]     [facility] (non-ST elevated [facility] infarction) [facility] [I21.4]     Anemia of chronic renal failure [N18.9, D63.1]     Crescentic glomerulonephritis [N05.7]     Type 2 diabetes mellitus with renal complication ([facility] [Y82.15]     Essential hypertension [I10]     Hypothyroidism [E03.9]     Mixed hyperlipidemia [E78.2]     Tobacco use disorder [F17.200]     Hyponatremia [E87.1]        The patient was seen and examined on day of discharge and this discharge summary is in conjunction with any daily progress note from day of discharge. Hospital Course:   History Of Present Illness:    66 y.o. male who presented to [address] [place] [address] with above complaints  Patient with PMH of diet-controlled DM2, HTN, HLD, CKD 5 on dialysis started 3 weeks ago presented to the ED with complaints of generalized weakness. Patient reports he sees Dr. Sravan Stewart with nephrology. He was started on dialysis about 3 weeks ago. Since then he has had increasing generalized weakness, decreased appetite. He finds it difficult to ambulate. Today morning he reports he was ambulating, went to dialysis finished his dialysis and after he came home he was very weak.   He required assistance to get back into the car to come home. At home he reports he was extremely weak and slowly went down to the ground by his spouse and she could not get him back up on his feet again so she ended up calling EMS. He denies any shortness of breath or chest pain. No syncopal episodes reported. He reports he has not been eating much. Still makes urine. Denied any nausea vomiting or diarrhea, no abdominal pain. Denies any fevers or chills. He has a TDC in his right chest.  Patient reports he was about 200 pounds when he started dialysis, currently at 190 pounds.         NSTEMI -cardiology consulted currently cardiac cath was on hold as troponin has been stabilized and stress test normal after cardiac catheter outpatient/elective plan to move forward with aortic valve replacement.      End-stage renal disease-on hemodialysis nephrology consulted and following.      Anemia of chronic kidney disease stable given 1 pack RBC transfusion due to elevated troponin NSTEMI.    -iv iron x 5 doses ordered by nephro(last dose was 5/26)      Hypertension controlled continue with current medication.   -off norvasc      Diabetes mellitus type 2 continue with the sliding scale ,  hemoglobin A1c= 5.9 on 5/21/22       Hyponatremia improved.       Cognitive deficit 2/2 acute CVA- neurology consulted, neurology consult appreciated   -MRI of the brain performed(noted smal acute /subacute cortical infarct in rt brachium pontis/cerebellar hemisphere)  Continued with aspirin, statin, physical Occupational Therapy.   -per wife/pt, they have been compliant with asa therapy   -spoke to neuro on 5/25, switched to plavix     Hypothyroid- with Elevated TSH level, borderline low normal free T4 on 5/17/2022. inc'd synthroid to 150mcg daily from 137mcg   -repeat labs in 4-6 weeks    Physical Exam Performed:     /61   Pulse 66   Temp 97.7 °F (36.5 °C)   Resp 16   Ht 5' 10\" (1.778 m)   Wt 194 lb 3.6 oz (88.1 kg)   SpO2 94% BMI 27.87 kg/m²       General appearance:  No apparent distress, appears stated age and cooperative. HEENT:  Normal cephalic, atraumatic without obvious deformity. Pupils equal, round, and reactive to light. Extra ocular muscles intact. Conjunctivae/corneas clear. Neck: Supple, with full range of motion. No jugular venous distention. Trachea midline. Respiratory:  Normal respiratory effort. Clear to auscultation, bilaterally without Rales/Wheezes/Rhonchi. Cardiovascular:  Regular rate and rhythm with normal S1/S2 without murmurs, rubs or gallops. Abdomen: Soft, non-tender, non-distended with normal bowel sounds. Musculoskeletal:  No clubbing, cyanosis or edema bilaterally. Full range of motion without deformity. Skin: Skin color, texture, turgor normal.  No rashes or lesions. Neurologic:  Neurovascularly intact without any focal sensory/motor deficits. Cranial nerves: II-XII intact, grossly non-focal.  Psychiatric:  Alert and oriented, thought content appropriate, normal insight  Capillary Refill: Brisk,< 3 seconds   Peripheral Pulses: +2 palpable, equal bilaterally       Labs: For convenience and continuity at follow-up the following most recent labs are provided:      CBC:    Lab Results   Component Value Date    WBC 13.7 05/26/2022    HGB 7.8 05/26/2022    HCT 24.3 05/26/2022     05/26/2022       Renal:    Lab Results   Component Value Date     05/26/2022    K 4.7 05/26/2022    K 3.9 05/22/2022     05/26/2022    CO2 24 05/26/2022    BUN 37 05/26/2022    CREATININE 6.7 05/26/2022    CALCIUM 8.9 05/26/2022    PHOS 3.2 05/26/2022         Significant Diagnostic Studies    Radiology:   VL DUP CAROTID BILATERAL   Final Result      MRI BRAIN WO CONTRAST   Final Result   1. Small acute to subacute cortical infarct involving the right brachium   pontis/cerebellar hemisphere. 2. No significant mass effect or acute hemorrhage.    3. Disproportionate enlargement of the ventricles relative to the degree of   parenchymal volume loss, which may reflect more central white matter volume   loss versus NPH in the appropriate clinical setting. 4. Chronic small vessel ischemic changes and remote lacunar infarcts   throughout the sadia. NM Cardiac Stress Test Nuclear Imaging   Final Result      CT HEAD WO CONTRAST   Final Result   No acute intracranial abnormality. Generalized cerebral atrophy and chronic small vessel white matter ischemic   changes. RECOMMENDATIONS:   Unavailable         XR CHEST PORTABLE   Final Result   No focal lung consolidation. Consults:     IP CONSULT TO CARDIOLOGY  IP CONSULT TO HOSPITALIST  IP CONSULT TO NEPHROLOGY  IP CONSULT TO NEUROLOGY    Disposition:  SNF     Condition at Discharge: Stable    Discharge Instructions/Follow-up:  nephro as outpt, cards as outpt as instructed    Code Status:  FULL    Activity: activity as tolerated    Diet: renal diet      Discharge Medications:     Discharge Medication List as of 5/26/2022  4:34 PM           Details   clopidogrel (PLAVIX) 75 MG tablet Take 1 tablet by mouth daily, Disp-30 tablet, R-1NO PRINT      megestrol (MEGACE) 40 MG/ML suspension Take 15.6 mLs by mouth daily, Disp-240 mL, R-1NO PRINT              Details   doxazosin (CARDURA) 1 MG tablet Take 1 tablet by mouth daily, Disp-30 tablet, R-0NO PRINT              Details   sevelamer (RENVELA) 800 MG tablet Take 1 tablet by mouth 3 times daily (with meals)Historical Med      carvedilol (COREG) 3.125 MG tablet Take 1 tablet by mouth 2 times daily (with meals), Disp-60 tablet, R-3Normal      atorvastatin (LIPITOR) 20 MG tablet Take 1 tablet by mouth daily, Disp-30 tablet, R-3Normal      nitroGLYCERIN (NITROSTAT) 0.4 MG SL tablet up to max of 3 total doses. If no relief after 1 dose, call 911., Disp-25 tablet, R-3Normal      Darbepoetin Imtiaz-Polysorbate (ARANESP, ALB FREE, SURECLICK IJ) Inject as directed Pt's wife unsure of dosage.  Pt had infusion on 4/27.Historical Med      calcium elemental (OSCAL) 500 MG TABS tablet Take 1 tablet by mouth daily, Disp-30 tablet, R-3Normal      Insulin Pen Needle 32G X 5 MM MISC 5 TIMES DAILY Starting Tue 4/10/2018, Disp-150 each, R-3, Normal      levothyroxine (SYNTHROID) 125 MCG tablet Take 137 mcg by mouth Daily Historical Med             Time Spent on discharge is more than 30 minutes in the examination, evaluation, counseling and review of medications and discharge plan. Signed:    Arlie Hashimoto, MD   5/26/2022      Thank you Juani Cabello MD for the opportunity to be involved in this patient's care. If you have any questions or concerns, please feel free to contact me at [phone].

## 2022-05-26 NOTE — CARE COORDINATION
.    CASE MANAGEMENT DISCHARGE SUMMARY      Discharge to: [facility] completed: floor to [facility] Exemption Notification (HENS) completed: yes    IMM given: (date) 5/25/2022    New Durable Medical Equipment ordered/agency:     Transportation:    Family/car:   Medical Transport explained to [employer]. Pt/family voice no agency preference.     Agency used:   time:   Ambulance form completed: Yes    Confirmed discharge plan with:MD/MWCC/RN     Patient: yes     Family:  yes    Name:[facility] number: [phone]     Facility/Agency, name:  [facility] faxed to [phone]   Phone number for report to facility: [phone]     RN, name: Terence Fuentes

## 2022-05-27 NOTE — TELEPHONE ENCOUNTER
We could see pt at [facility] cecil Miranda or Andria Kaye on a Thursday TAVR spot. If Fridays work, I could ask Tadeo Duncan permission to use a Friday Watchman spot.  Juju TRAN

## 2022-05-31 NOTE — TELEPHONE ENCOUNTER
Wife calling back asking what the plan is now. Pt is currently in rehab. Pt has dialysis Tuesday, Thursday, and Saturday. Please call wife to set up appt With Dr Lucila Blake so she can let rehab know so transportation can be arranged. Wife sts pt unable to walk at this time. Please advise.

## 2022-06-05 ENCOUNTER — HOSPITAL ENCOUNTER (EMERGENCY)
Age: 72
Discharge: NURSING, REHAB OR LONG-TERM CARE | End: 2022-06-05
Attending: EMERGENCY MEDICINE
Payer: MEDICARE

## 2022-06-05 ENCOUNTER — APPOINTMENT (OUTPATIENT)
Dept: GENERAL RADIOLOGY | Age: 72
End: 2022-06-05
Payer: MEDICARE

## 2022-06-05 VITALS
SYSTOLIC BLOOD PRESSURE: 127 MMHG | HEART RATE: 70 BPM | WEIGHT: 194 LBS | TEMPERATURE: 98.7 F | RESPIRATION RATE: 22 BRPM | DIASTOLIC BLOOD PRESSURE: 69 MMHG | OXYGEN SATURATION: 97 % | HEIGHT: 70 IN | BODY MASS INDEX: 27.77 KG/M2

## 2022-06-05 DIAGNOSIS — D64.9 CHRONIC ANEMIA: Primary | ICD-10-CM

## 2022-06-05 LAB
A/G RATIO: 0.6 (ref 1.1–2.2)
ALBUMIN SERPL-MCNC: 2.4 G/DL (ref 3.4–5)
ALP BLD-CCNC: 615 U/L (ref 40–129)
ALT SERPL-CCNC: 11 U/L (ref 10–40)
ANION GAP SERPL CALCULATED.3IONS-SCNC: 8 MMOL/L (ref 3–16)
AST SERPL-CCNC: 21 U/L (ref 15–37)
BASE EXCESS VENOUS: 5.3 MMOL/L (ref -3–3)
BASOPHILS ABSOLUTE: 0.1 K/UL (ref 0–0.2)
BASOPHILS RELATIVE PERCENT: 0.7 %
BILIRUB SERPL-MCNC: 0.5 MG/DL (ref 0–1)
BUN BLDV-MCNC: 21 MG/DL (ref 7–20)
CALCIUM SERPL-MCNC: 8.7 MG/DL (ref 8.3–10.6)
CARBOXYHEMOGLOBIN: 3.8 % (ref 0–1.5)
CHLORIDE BLD-SCNC: 97 MMOL/L (ref 99–110)
CO2: 30 MMOL/L (ref 21–32)
CREAT SERPL-MCNC: 3.5 MG/DL (ref 0.8–1.3)
EOSINOPHILS ABSOLUTE: 0.6 K/UL (ref 0–0.6)
EOSINOPHILS RELATIVE PERCENT: 4.6 %
GFR AFRICAN AMERICAN: 21
GFR NON-AFRICAN AMERICAN: 17
GLUCOSE BLD-MCNC: 120 MG/DL (ref 70–99)
HCO3 VENOUS: 27.8 MMOL/L (ref 23–29)
HCT VFR BLD CALC: 22.7 % (ref 40.5–52.5)
HEMOGLOBIN: 7.3 G/DL (ref 13.5–17.5)
LACTIC ACID: 1.1 MMOL/L (ref 0.4–2)
LIPASE: 16 U/L (ref 13–60)
LYMPHOCYTES ABSOLUTE: 1.1 K/UL (ref 1–5.1)
LYMPHOCYTES RELATIVE PERCENT: 8.6 %
MCH RBC QN AUTO: 27.6 PG (ref 26–34)
MCHC RBC AUTO-ENTMCNC: 32.1 G/DL (ref 31–36)
MCV RBC AUTO: 85.9 FL (ref 80–100)
METHEMOGLOBIN VENOUS: 0.6 %
MONOCYTES ABSOLUTE: 0.9 K/UL (ref 0–1.3)
MONOCYTES RELATIVE PERCENT: 7 %
NEUTROPHILS ABSOLUTE: 10.1 K/UL (ref 1.7–7.7)
NEUTROPHILS RELATIVE PERCENT: 79.1 %
O2 SAT, VEN: 93 %
O2 THERAPY: ABNORMAL
PCO2, VEN: 32 MMHG (ref 40–50)
PDW BLD-RTO: 17.7 % (ref 12.4–15.4)
PH VENOUS: 7.56 (ref 7.35–7.45)
PLATELET # BLD: 477 K/UL (ref 135–450)
PMV BLD AUTO: 7.4 FL (ref 5–10.5)
PO2, VEN: 58.9 MMHG (ref 25–40)
POTASSIUM REFLEX MAGNESIUM: 3.8 MMOL/L (ref 3.5–5.1)
RBC # BLD: 2.65 M/UL (ref 4.2–5.9)
SODIUM BLD-SCNC: 135 MMOL/L (ref 136–145)
TCO2 CALC VENOUS: 29 MMOL/L
TOTAL PROTEIN: 6.4 G/DL (ref 6.4–8.2)
WBC # BLD: 12.8 K/UL (ref 4–11)

## 2022-06-05 PROCEDURE — 99284 EMERGENCY DEPT VISIT MOD MDM: CPT

## 2022-06-05 PROCEDURE — 83690 ASSAY OF LIPASE: CPT

## 2022-06-05 PROCEDURE — 85025 COMPLETE CBC W/AUTO DIFF WBC: CPT

## 2022-06-05 PROCEDURE — 82803 BLOOD GASES ANY COMBINATION: CPT

## 2022-06-05 PROCEDURE — 80053 COMPREHEN METABOLIC PANEL: CPT

## 2022-06-05 PROCEDURE — 71045 X-RAY EXAM CHEST 1 VIEW: CPT

## 2022-06-05 PROCEDURE — 83605 ASSAY OF LACTIC ACID: CPT

## 2022-06-05 ASSESSMENT — ENCOUNTER SYMPTOMS
PHOTOPHOBIA: 0
COUGH: 0
COLOR CHANGE: 0
ABDOMINAL PAIN: 0
SHORTNESS OF BREATH: 0
VOMITING: 0

## 2022-06-05 NOTE — ED NOTES
Patient resting in bed at this time. Patient denies needs. RN will continue to monitor.       Ashly Rodriguez RN  06/05/22 9243

## 2022-06-05 NOTE — ED PROVIDER NOTES
Emergency Department Provider Note  Location: [facility]  ED  6/5/2022     Patient Identification  Abdirahman Lee is a 66 y.o. male    Chief Complaint  Abnormal Lab (From [facility] [place] H&H low. Dialysis [facility])      Mode of Arrival  EMS    HPI  (History provided by patient, spouse/SO and NH report)  This is a 66 y.o. male with a PMH significant for DM, CVA, ESRD on dialysis [facility] presented today for abnormal lab. Per nursing home, patient had a \"fever\" yesterday morning so labs were drawn. Night shift did not observe any fever but received a phone call that patient's H/H was low so patient was sent to our ED for further evaluation. Pt denies dark stool or feeling any different. He also denies cough, congestion, diarrhea, or other infectious symptoms. Wife also unaware of any fever or infectious symptoms. ROS  Review of Systems   Constitutional: Negative for chills and diaphoresis. HENT: Negative for congestion. Eyes: Negative for photophobia. Respiratory: Negative for cough and shortness of breath. Cardiovascular: Negative for chest pain. Gastrointestinal: Negative for abdominal pain and vomiting. Genitourinary: Negative for flank pain. Musculoskeletal: Negative for myalgias. Skin: Negative for color change (wife states patient's skin color is about the same) and rash. Neurological: Negative for syncope and headaches. I have reviewed the following nursing documentation:  Allergies: No Known Allergies    Past medical history:  has a past medical history of Anemia, Diabetes mellitus ([address] ESRD (end stage renal disease) on dialysis ([address] Hyperlipidemia, and Hypertension. Past surgical history:  has no past surgical history on file. Home medications:   Prior to Admission medications    Medication Sig Start Date End Date Taking?  Authorizing Provider   doxazosin (CARDURA) 1 MG tablet Take 1 tablet by mouth daily 5/26/22   Joseph Smith MD   clopidogrel (PLAVIX) 75 MG tablet Take 1 tablet by mouth daily 5/26/22   Kb Ventura MD   megestrol (MEGACE) 40 MG/ML suspension Take 15.6 mLs by mouth daily 5/26/22   Kb Ventura MD   sevelamer (RENVELA) 800 MG tablet Take 1 tablet by mouth 3 times daily (with meals)    Historical Provider, MD   carvedilol (COREG) 3.125 MG tablet Take 1 tablet by mouth 2 times daily (with meals) 5/10/18   Carson Boyce MD   atorvastatin (LIPITOR) 20 MG tablet Take 1 tablet by mouth daily 5/11/18   Carson Boyce MD   nitroGLYCERIN (NITROSTAT) 0.4 MG SL tablet up to max of 3 total doses. If no relief after 1 dose, call 911. 5/10/18   Carson Boyce MD   Darbepoetin Imtiaz-Polysorbate (ARANESP, ALB FREE, SURECLICK IJ) Inject as directed Pt's wife unsure of dosage. Pt had infusion on 4/27. Historical Provider, MD   calcium elemental (OSCAL) 500 MG TABS tablet Take 1 tablet by mouth daily 4/11/18   Terry Bae MD   Insulin Pen Needle 32G X 5 MM MISC 1 each by Does not apply route 5 times daily 4/10/18   Terry Bae MD   levothyroxine (SYNTHROID) 125 MCG tablet Take 137 mcg by mouth Daily     Historical Provider, MD       Social history:  reports that he quit smoking about 4 years ago. His smoking use included cigarettes. He smoked 0.50 packs per day. He has never used smokeless tobacco. He reports that he does not drink alcohol and does not use drugs. Family history:  No family history on file. Exam  ED Triage Vitals [06/05/22 0119]   BP Temp Temp Source Heart Rate Resp SpO2 Height Weight   128/62 98.7 °F (37.1 °C) Oral 73 14 97 % 5' 10\" (1.778 m) 194 lb (88 kg)   Physical Exam  Vitals and nursing note reviewed. Constitutional:       General: He is not in acute distress. Appearance: He is well-developed. He is not diaphoretic. HENT:      Head: Normocephalic and atraumatic. Eyes:      General: No scleral icterus. Right eye: No discharge. Left eye: No discharge.       Conjunctiva/sclera: Conjunctivae normal. Neck:      Trachea: No tracheal deviation. Cardiovascular:      Rate and Rhythm: Normal rate and regular rhythm. Heart sounds: Murmur (Wife states it is a known murmur) heard. Pulmonary:      Effort: Pulmonary effort is normal. No respiratory distress. Breath sounds: Normal breath sounds. No stridor. No wheezing. Abdominal:      General: There is no distension. Palpations: Abdomen is soft. Tenderness: There is no abdominal tenderness. There is no guarding or rebound. Musculoskeletal:         General: No deformity. Cervical back: Neck supple. Right lower leg: No edema. Left lower leg: No edema. Skin:     General: Skin is warm and dry. Findings: No rash. Comments: Patient appears slightly jaundiced but wife states that is his normal skin tone   Neurological:      Mental Status: He is alert. Mental status is at baseline. Cranial Nerves: No facial asymmetry. Motor: Weakness (generalized; stable per wife) present. No abnormal muscle tone. Psychiatric:         Mood and Affect: Affect is flat. MDM/ED Course  Radiology  XR CHEST PORTABLE    Result Date: 6/5/2022  EXAMINATION: ONE XRAY VIEW OF THE CHEST 6/5/2022 3:39 am COMPARISON: Chest radiograph dated May 17, 2022 HISTORY: ORDERING SYSTEM PROVIDED HISTORY: fever TECHNOLOGIST PROVIDED HISTORY: Reason for exam:->fever Reason for Exam: fever and fatigue, pt to ED for low H&H FINDINGS: Cardiomegaly is noted. A right-sided dialysis catheter is in place. There is no focal consolidation, pleural effusion, or pneumothorax. There is no evidence of edema. No acute findings.          Labs  Results for orders placed or performed during the hospital encounter of 06/05/22   CBC with Auto Differential   Result Value Ref Range    WBC 12.8 (H) 4.0 - 11.0 K/uL    RBC 2.65 (L) 4.20 - 5.90 M/uL    Hemoglobin 7.3 (L) 13.5 - 17.5 g/dL    Hematocrit 22.7 (L) 40.5 - 52.5 %    MCV 85.9 80.0 - 100.0 fL    MCH 27.6 26.0 - 34.0 pg    MCHC 32.1 31.0 - 36.0 g/dL    RDW 17.7 (H) 12.4 - 15.4 %    Platelets 451 (H) 030 - 450 K/uL    MPV 7.4 5.0 - 10.5 fL    Neutrophils % 79.1 %    Lymphocytes % 8.6 %    Monocytes % 7.0 %    Eosinophils % 4.6 %    Basophils % 0.7 %    Neutrophils Absolute 10.1 (H) 1.7 - 7.7 K/uL    Lymphocytes Absolute 1.1 1.0 - 5.1 K/uL    Monocytes Absolute 0.9 0.0 - 1.3 K/uL    Eosinophils Absolute 0.6 0.0 - 0.6 K/uL    Basophils Absolute 0.1 0.0 - 0.2 K/uL   Comprehensive Metabolic Panel w/ Reflex to MG   Result Value Ref Range    Sodium 135 (L) 136 - 145 mmol/L    Potassium reflex Magnesium 3.8 3.5 - 5.1 mmol/L    Chloride 97 (L) 99 - 110 mmol/L    CO2 30 21 - 32 mmol/L    Anion Gap 8 3 - 16    Glucose 120 (H) 70 - 99 mg/dL    BUN 21 (H) 7 - 20 mg/dL    CREATININE 3.5 (H) 0.8 - 1.3 mg/dL    GFR Non-African American 17 (A) >60    GFR  21 (A) >60    Calcium 8.7 8.3 - 10.6 mg/dL    Total Protein 6.4 6.4 - 8.2 g/dL    Albumin 2.4 (L) 3.4 - 5.0 g/dL    Albumin/Globulin Ratio 0.6 (L) 1.1 - 2.2    Total Bilirubin 0.5 0.0 - 1.0 mg/dL    Alkaline Phosphatase 615 (H) 40 - 129 U/L    ALT 11 10 - 40 U/L    AST 21 15 - 37 U/L   Lipase   Result Value Ref Range    Lipase 16.0 13.0 - 60.0 U/L   Lactic Acid   Result Value Ref Range    Lactic Acid 1.1 0.4 - 2.0 mmol/L   Blood Gas, Venous   Result Value Ref Range    pH, Nikko 7.557 (H) 7.350 - 7.450    pCO2, Nikko 32.0 (L) 40.0 - 50.0 mmHg    pO2, Nikko 58.9 (H) 25.0 - 40.0 mmHg    HCO3, Venous 27.8 23.0 - 29.0 mmol/L    Base Excess, Nikko 5.3 (H) -3.0 - 3.0 mmol/L    O2 Sat, Nikko 93 Not Established %    Carboxyhemoglobin 3.8 (H) 0.0 - 1.5 %    MetHgb, Nikko 0.6 <1.5 %    TC02 (Calc), Nikko 29 Not Established mmol/L    O2 Therapy Unknown          - Patient seen and evaluated in room 5.  66 y.o. male presented for abnormal lab. H/H low but upon review of patient's old record, H/H is the same as prior.  Patient is not symptomatic from his anemia so no further intervention needed at this time.  -Nursing home dayshift reported fever but night shift denied it. Patient has been afebrile here. We initiated fever work-up. White count 12.8 is improved compared to prior. Chest x-ray clear. Patient does not make significant amount of urine anymore and is on dialysis so risks of UTI very low. - Patient was placed on telemetry during his/her ED stay and no malignant dysrhythmia observed. - Pertinent old records reviewed. - I discussed the results with patient and spouse/SO. We agreed to discharge back to nursing home. - Return precautions also discussed. patient verbalized understanding of care plan and agreed to follow-up with PCP as advised. I estimate there is LOW risk for ACUTE CORONARY SYNDROME, INTRACRANIAL HEMORRHAGE, MALIGNANT DYSRHYTHMIA, MENINGITIS, PNEUMONIA, PULMONARY EMBOLISM, SEPSIS, SUBARACHNOID HEMORRHAGE, SUBDURAL HEMATOMA, or STROKE, thus I consider the discharge disposition reasonable. Jennifer Ulloa and I have discussed the diagnosis and risks, and we agree with discharging home to follow-up with PCP. We also discussed returning to the Emergency Department immediately if new or worsening symptoms occur. We have discussed the symptoms which are most concerning (e.g., changing or worsening pain, weakness, vomiting, fever) that necessitate immediate return. Clinical Impression:  1. Chronic anemia          Disposition:  Discharge to nursing home in stable condition. Blood pressure (!) 146/61, pulse 80, temperature 98.7 °F (37.1 °C), temperature source Oral, resp. rate 17, height 5' 10\" (1.778 m), weight 194 lb (88 kg), SpO2 97 %.      Disposition referral (if applicable):  Mikhail Leon MD  [address] [place] [id number] [phone]    Schedule an appointment as soon as possible for a visit in 3 days           This chart was generated in part by using Dragon Dictation system and may contain errors related to that system including errors in grammar, punctuation, and spelling, as well as words and phrases that may be inappropriate. If there are any questions or concerns please feel free to contact the dictating provider for clarification.      Jesus Gonzalez MD  [address] [place] [address] Myrle Phalen, MD  06/05/22 9393

## 2022-06-07 NOTE — TELEPHONE ENCOUNTER
06/07-Called ([phone]) the number just continued to ring, unable to leave . Called ([phone]) spoke to [address] [place] [address], stated pt is still unable to walk and move around. Pt is still currently in a rehab facility. [address] [place] [address] stated she would contact us as soon as they were ready to schedule. I relayed phone numbers and dates for [facility] at [facility],  and [place].

## 2022-06-10 ENCOUNTER — HOSPITAL ENCOUNTER (INPATIENT)
Age: 72
LOS: 7 days | Discharge: HOSPICE | DRG: 064 | End: 2022-06-17
Attending: EMERGENCY MEDICINE | Admitting: INTERNAL MEDICINE
Payer: MEDICARE

## 2022-06-10 ENCOUNTER — APPOINTMENT (OUTPATIENT)
Dept: CT IMAGING | Age: 72
DRG: 064 | End: 2022-06-10
Payer: MEDICARE

## 2022-06-10 ENCOUNTER — APPOINTMENT (OUTPATIENT)
Dept: MRI IMAGING | Age: 72
DRG: 064 | End: 2022-06-10
Payer: MEDICARE

## 2022-06-10 ENCOUNTER — APPOINTMENT (OUTPATIENT)
Dept: GENERAL RADIOLOGY | Age: 72
DRG: 064 | End: 2022-06-10
Payer: MEDICARE

## 2022-06-10 DIAGNOSIS — R41.0 CONFUSION: ICD-10-CM

## 2022-06-10 DIAGNOSIS — R41.82 ALTERED MENTAL STATUS, UNSPECIFIED ALTERED MENTAL STATUS TYPE: Primary | ICD-10-CM

## 2022-06-10 DIAGNOSIS — R53.1 RIGHT SIDED WEAKNESS: ICD-10-CM

## 2022-06-10 DIAGNOSIS — R44.9 RIGHT-SIDED SENSORY DEFICIT PRESENT: ICD-10-CM

## 2022-06-10 PROBLEM — N18.6 ESRD (END STAGE RENAL DISEASE) (HCC): Status: ACTIVE | Noted: 2022-06-10

## 2022-06-10 PROBLEM — I63.9 ACUTE CVA (CEREBROVASCULAR ACCIDENT) (HCC): Status: ACTIVE | Noted: 2022-06-10

## 2022-06-10 PROBLEM — I65.22 LEFT CAROTID STENOSIS: Status: ACTIVE | Noted: 2022-06-10

## 2022-06-10 PROBLEM — I35.0 SEVERE AORTIC STENOSIS: Status: ACTIVE | Noted: 2022-06-10

## 2022-06-10 LAB
A/G RATIO: 0.6 (ref 1.1–2.2)
ALBUMIN SERPL-MCNC: 2.3 G/DL (ref 3.4–5)
ALP BLD-CCNC: 611 U/L (ref 40–129)
ALT SERPL-CCNC: 13 U/L (ref 10–40)
ANION GAP SERPL CALCULATED.3IONS-SCNC: 11 MMOL/L (ref 3–16)
AST SERPL-CCNC: 27 U/L (ref 15–37)
BASE EXCESS VENOUS: 2.4 MMOL/L (ref -3–3)
BASOPHILS ABSOLUTE: 0.1 K/UL (ref 0–0.2)
BASOPHILS RELATIVE PERCENT: 0.6 %
BILIRUB SERPL-MCNC: 0.6 MG/DL (ref 0–1)
BUN BLDV-MCNC: 29 MG/DL (ref 7–20)
CALCIUM SERPL-MCNC: 8.7 MG/DL (ref 8.3–10.6)
CARBOXYHEMOGLOBIN: 2.6 % (ref 0–1.5)
CHLORIDE BLD-SCNC: 97 MMOL/L (ref 99–110)
CO2: 27 MMOL/L (ref 21–32)
CREAT SERPL-MCNC: 4.2 MG/DL (ref 0.8–1.3)
EKG ATRIAL RATE: 92 BPM
EKG DIAGNOSIS: NORMAL
EKG P AXIS: 88 DEGREES
EKG P-R INTERVAL: 238 MS
EKG Q-T INTERVAL: 394 MS
EKG QRS DURATION: 128 MS
EKG QTC CALCULATION (BAZETT): 487 MS
EKG R AXIS: 78 DEGREES
EKG T AXIS: -5 DEGREES
EKG VENTRICULAR RATE: 92 BPM
EOSINOPHILS ABSOLUTE: 0.4 K/UL (ref 0–0.6)
EOSINOPHILS RELATIVE PERCENT: 2.7 %
GFR AFRICAN AMERICAN: 17
GFR NON-AFRICAN AMERICAN: 14
GLUCOSE BLD-MCNC: 137 MG/DL (ref 70–99)
GLUCOSE BLD-MCNC: 142 MG/DL (ref 70–99)
HCO3 VENOUS: 25.7 MMOL/L (ref 23–29)
HCT VFR BLD CALC: 22.2 % (ref 40.5–52.5)
HEMOGLOBIN: 7.1 G/DL (ref 13.5–17.5)
LYMPHOCYTES ABSOLUTE: 0.9 K/UL (ref 1–5.1)
LYMPHOCYTES RELATIVE PERCENT: 5.5 %
MAGNESIUM: 1.7 MG/DL (ref 1.8–2.4)
MCH RBC QN AUTO: 27.3 PG (ref 26–34)
MCHC RBC AUTO-ENTMCNC: 32.1 G/DL (ref 31–36)
MCV RBC AUTO: 85.1 FL (ref 80–100)
METHEMOGLOBIN VENOUS: 0.9 %
MONOCYTES ABSOLUTE: 1 K/UL (ref 0–1.3)
MONOCYTES RELATIVE PERCENT: 6.2 %
NEUTROPHILS ABSOLUTE: 13.6 K/UL (ref 1.7–7.7)
NEUTROPHILS RELATIVE PERCENT: 85 %
O2 SAT, VEN: 98 %
O2 THERAPY: ABNORMAL
PCO2, VEN: 34 MMHG (ref 40–50)
PDW BLD-RTO: 18.4 % (ref 12.4–15.4)
PERFORMED ON: ABNORMAL
PH VENOUS: 7.5 (ref 7.35–7.45)
PLATELET # BLD: 491 K/UL (ref 135–450)
PMV BLD AUTO: 7.1 FL (ref 5–10.5)
PO2, VEN: 108.8 MMHG (ref 25–40)
POTASSIUM REFLEX MAGNESIUM: 3.5 MMOL/L (ref 3.5–5.1)
RBC # BLD: 2.61 M/UL (ref 4.2–5.9)
SODIUM BLD-SCNC: 135 MMOL/L (ref 136–145)
TCO2 CALC VENOUS: 27 MMOL/L
TOTAL PROTEIN: 6 G/DL (ref 6.4–8.2)
TROPONIN: 0.79 NG/ML
TROPONIN: 0.79 NG/ML
TROPONIN: 0.8 NG/ML
WBC # BLD: 16 K/UL (ref 4–11)

## 2022-06-10 PROCEDURE — 92526 ORAL FUNCTION THERAPY: CPT

## 2022-06-10 PROCEDURE — 85025 COMPLETE CBC W/AUTO DIFF WBC: CPT

## 2022-06-10 PROCEDURE — 6360000002 HC RX W HCPCS: Performed by: NURSE PRACTITIONER

## 2022-06-10 PROCEDURE — 6360000002 HC RX W HCPCS: Performed by: INTERNAL MEDICINE

## 2022-06-10 PROCEDURE — 6360000004 HC RX CONTRAST MEDICATION: Performed by: EMERGENCY MEDICINE

## 2022-06-10 PROCEDURE — 70551 MRI BRAIN STEM W/O DYE: CPT

## 2022-06-10 PROCEDURE — 82803 BLOOD GASES ANY COMBINATION: CPT

## 2022-06-10 PROCEDURE — 70450 CT HEAD/BRAIN W/O DYE: CPT

## 2022-06-10 PROCEDURE — 80053 COMPREHEN METABOLIC PANEL: CPT

## 2022-06-10 PROCEDURE — 6370000000 HC RX 637 (ALT 250 FOR IP): Performed by: NURSE PRACTITIONER

## 2022-06-10 PROCEDURE — 36415 COLL VENOUS BLD VENIPUNCTURE: CPT

## 2022-06-10 PROCEDURE — 93005 ELECTROCARDIOGRAM TRACING: CPT | Performed by: EMERGENCY MEDICINE

## 2022-06-10 PROCEDURE — 1200000000 HC SEMI PRIVATE

## 2022-06-10 PROCEDURE — 70496 CT ANGIOGRAPHY HEAD: CPT

## 2022-06-10 PROCEDURE — 92610 EVALUATE SWALLOWING FUNCTION: CPT

## 2022-06-10 PROCEDURE — 84484 ASSAY OF TROPONIN QUANT: CPT

## 2022-06-10 PROCEDURE — 83735 ASSAY OF MAGNESIUM: CPT

## 2022-06-10 PROCEDURE — 71045 X-RAY EXAM CHEST 1 VIEW: CPT

## 2022-06-10 PROCEDURE — 99285 EMERGENCY DEPT VISIT HI MDM: CPT

## 2022-06-10 PROCEDURE — 4A03X5D MEASUREMENT OF ARTERIAL FLOW, INTRACRANIAL, EXTERNAL APPROACH: ICD-10-PCS | Performed by: RADIOLOGY

## 2022-06-10 PROCEDURE — 93010 ELECTROCARDIOGRAM REPORT: CPT | Performed by: INTERNAL MEDICINE

## 2022-06-10 RX ORDER — ONDANSETRON 2 MG/ML
4 INJECTION INTRAMUSCULAR; INTRAVENOUS EVERY 6 HOURS PRN
Status: DISCONTINUED | OUTPATIENT
Start: 2022-06-10 | End: 2022-06-17 | Stop reason: HOSPADM

## 2022-06-10 RX ORDER — MAGNESIUM SULFATE 1 G/100ML
1000 INJECTION INTRAVENOUS ONCE
Status: COMPLETED | OUTPATIENT
Start: 2022-06-10 | End: 2022-06-10

## 2022-06-10 RX ORDER — SEVELAMER CARBONATE 800 MG/1
800 TABLET, FILM COATED ORAL
Status: DISCONTINUED | OUTPATIENT
Start: 2022-06-10 | End: 2022-06-17 | Stop reason: HOSPADM

## 2022-06-10 RX ORDER — ATORVASTATIN CALCIUM 10 MG/1
20 TABLET, FILM COATED ORAL DAILY
Status: DISCONTINUED | OUTPATIENT
Start: 2022-06-11 | End: 2022-06-17

## 2022-06-10 RX ORDER — ASPIRIN 81 MG/1
81 TABLET ORAL DAILY
Status: DISCONTINUED | OUTPATIENT
Start: 2022-06-11 | End: 2022-06-13

## 2022-06-10 RX ORDER — ONDANSETRON 4 MG/1
4 TABLET, ORALLY DISINTEGRATING ORAL EVERY 8 HOURS PRN
Status: DISCONTINUED | OUTPATIENT
Start: 2022-06-10 | End: 2022-06-17 | Stop reason: HOSPADM

## 2022-06-10 RX ORDER — HEPARIN SODIUM 5000 [USP'U]/ML
5000 INJECTION, SOLUTION INTRAVENOUS; SUBCUTANEOUS EVERY 8 HOURS SCHEDULED
Status: DISCONTINUED | OUTPATIENT
Start: 2022-06-10 | End: 2022-06-11

## 2022-06-10 RX ORDER — ENOXAPARIN SODIUM 100 MG/ML
30 INJECTION SUBCUTANEOUS DAILY
Status: DISCONTINUED | OUTPATIENT
Start: 2022-06-10 | End: 2022-06-10 | Stop reason: DRUGHIGH

## 2022-06-10 RX ORDER — ASPIRIN 81 MG/1
81 TABLET, CHEWABLE ORAL ONCE
Status: COMPLETED | OUTPATIENT
Start: 2022-06-10 | End: 2022-06-10

## 2022-06-10 RX ORDER — POLYETHYLENE GLYCOL 3350 17 G/17G
17 POWDER, FOR SOLUTION ORAL DAILY PRN
Status: DISCONTINUED | OUTPATIENT
Start: 2022-06-10 | End: 2022-06-17 | Stop reason: HOSPADM

## 2022-06-10 RX ORDER — CARVEDILOL 3.12 MG/1
3.12 TABLET ORAL 2 TIMES DAILY WITH MEALS
Status: DISCONTINUED | OUTPATIENT
Start: 2022-06-10 | End: 2022-06-17 | Stop reason: HOSPADM

## 2022-06-10 RX ORDER — DOXAZOSIN 2 MG/1
1 TABLET ORAL DAILY
Status: DISCONTINUED | OUTPATIENT
Start: 2022-06-11 | End: 2022-06-17 | Stop reason: HOSPADM

## 2022-06-10 RX ORDER — CALCIUM CARBONATE 500(1250)
500 TABLET ORAL DAILY
Status: DISCONTINUED | OUTPATIENT
Start: 2022-06-11 | End: 2022-06-17

## 2022-06-10 RX ORDER — CLOPIDOGREL BISULFATE 75 MG/1
75 TABLET ORAL ONCE
Status: COMPLETED | OUTPATIENT
Start: 2022-06-10 | End: 2022-06-10

## 2022-06-10 RX ORDER — CLOPIDOGREL BISULFATE 75 MG/1
75 TABLET ORAL DAILY
Status: DISCONTINUED | OUTPATIENT
Start: 2022-06-11 | End: 2022-06-17

## 2022-06-10 RX ORDER — ASPIRIN 300 MG/1
300 SUPPOSITORY RECTAL DAILY
Status: DISCONTINUED | OUTPATIENT
Start: 2022-06-11 | End: 2022-06-13

## 2022-06-10 RX ADMIN — MAGNESIUM SULFATE HEPTAHYDRATE 1000 MG: 1 INJECTION, SOLUTION INTRAVENOUS at 12:26

## 2022-06-10 RX ADMIN — ASPIRIN 81 MG 81 MG: 81 TABLET ORAL at 09:15

## 2022-06-10 RX ADMIN — HEPARIN SODIUM 5000 UNITS: 5000 INJECTION INTRAVENOUS; SUBCUTANEOUS at 15:14

## 2022-06-10 RX ADMIN — IOPAMIDOL 75 ML: 755 INJECTION, SOLUTION INTRAVENOUS at 08:33

## 2022-06-10 RX ADMIN — HEPARIN SODIUM 5000 UNITS: 5000 INJECTION INTRAVENOUS; SUBCUTANEOUS at 21:57

## 2022-06-10 RX ADMIN — CLOPIDOGREL BISULFATE 75 MG: 75 TABLET, FILM COATED ORAL at 10:19

## 2022-06-10 ASSESSMENT — ENCOUNTER SYMPTOMS
BACK PAIN: 0
DIARRHEA: 0
ABDOMINAL PAIN: 0
PHOTOPHOBIA: 0
VOMITING: 0
NAUSEA: 0
WHEEZING: 0
COLOR CHANGE: 0
SHORTNESS OF BREATH: 0
COUGH: 0

## 2022-06-10 ASSESSMENT — PAIN SCALES - GENERAL: PAINLEVEL_OUTOF10: 0

## 2022-06-10 ASSESSMENT — PAIN - FUNCTIONAL ASSESSMENT: PAIN_FUNCTIONAL_ASSESSMENT: NONE - DENIES PAIN

## 2022-06-10 NOTE — PLAN OF CARE
Problem: SLP Adult - Impaired Swallowing  Goal: By Discharge: Advance to least restrictive diet without signs or symptoms of aspiration for planned discharge setting. See evaluation for individualized goals. Note: SLP completed evaluation. Please refer to notes in EMR.      [address]  Clinician    Co-Signing Supervisor:  Rayo Morton M.A., [address] [place] [address]  Phone: [phone]

## 2022-06-10 NOTE — PROGRESS NOTES
TODAYS DATE:  6/10/2022    Discussed personal risk factors for Stroke /TIA with patient/family, and ways to reduce the risk for a recurrent stroke. Patient's personal risk factors which were identified are:     [x] Alcohol Abuse: check with your physician before any alcohol consumption. [] Atrial fibrillation: may cause blood clots. [] Drug Abuse: Seek help, talk with your doctor  [] Clotting Disorder  [x] Diabetes  [x] Family history of stroke or heart disease  [x] High Blood Pressure/Hypertension: work with your physician. [x] High cholesterol: monitor cholesterol levels with your physician.   [] Overweight/Obesity: work with your physician for your ideal body weight. [x] Physical Inactivity: get regular exercise as directed by your physician. [x] Personal history of previous TIA or stroke  [] Poor Diet; decrease salt (sodium) in your diet, follow diet directed by physician. [x] Smoking: Cigarette/Cigar: stop smoking. Reviewed the Following Education with Patient and/or Family:   -Signs and Symptoms of Stroke:     (facial droop, weakness/numbness especially on one side, speech difficulty, sudden confusion, sudden loss of vision, sudden severe headache,       sudden loss of balance or having difficulty walking, syncope or seizure)  -How to activate EMS (911)   -Importance of Follow Up Appointment at Discharge   -Importance of Compliance with Medications Prescribed at Discharge     Pt and daughter Eren Gorman verbalized understanding.      Family Present during Education: yes     Stroke Education Booklet at Bedside: yes     Electronically signed by Tsering Kc RN on 6/10/2022 at 6:41 PM

## 2022-06-10 NOTE — PLAN OF CARE
Problem: Discharge Planning  Goal: Discharge to home or other facility with appropriate resources  6/10/2022 1847 by Bruna Toledo RN  Outcome: Progressing  Flowsheets (Taken 6/10/2022 1845)  Discharge to home or other facility with appropriate resources: Identify barriers to discharge with patient and caregiver  6/10/2022 1543 by Bruna Toledo RN  Outcome: Progressing  Flowsheets  Taken 6/10/2022 1539  Discharge to home or other facility with appropriate resources: Identify barriers to discharge with patient and caregiver  Taken 6/10/2022 1343  Discharge to home or other facility with appropriate resources: Identify barriers to discharge with patient and caregiver     Problem: Skin/Tissue Integrity  Goal: Absence of new skin breakdown  Description: 1. Monitor for areas of redness and/or skin breakdown  2. Assess vascular access sites hourly  3. Every 4-6 hours minimum:  Change oxygen saturation probe site  4. Every 4-6 hours:  If on nasal continuous positive airway pressure, respiratory therapy assess nares and determine need for appliance change or resting period. 6/10/2022 1847 by Bruna Toledo RN  Outcome: Progressing  6/10/2022 1543 by Bruna Toledo RN  Outcome: Progressing     Problem: SLP Adult - Impaired Swallowing  Goal: By Discharge: Advance to least restrictive diet without signs or symptoms of aspiration for planned discharge setting. See evaluation for individualized goals. 6/10/2022 1537 by Sharyle Sizer, SLP  Note: SLP completed evaluation. Please refer to notes in EMR. Problem: Safety - Adult  Goal: Free from fall injury  6/10/2022 1847 by Bruna Toledo RN  Outcome: Progressing  6/10/2022 1543 by Bruna Toledo RN  Outcome: Progressing  Flowsheets (Taken 6/10/2022 1530)  Free From Fall Injury: Instruct family/caregiver on patient safety     Problem: Confusion  Goal: Confusion, delirium, dementia, or psychosis is improved or at baseline  Description: INTERVENTIONS:  1.  Assess for possible

## 2022-06-10 NOTE — ED PROVIDER NOTES
**ADVANCED PRACTICE PROVIDER, I HAVE EVALUATED THIS [facility]  ED  EMERGENCY DEPARTMENT ENCOUNTER      Pt Name: Janes Sloan  TXI:[id number]  [place] 1950  Date of evaluation: 6/10/2022  Provider: VIVIEN Trevino CNP      Chief Complaint:    Chief Complaint   Patient presents with    Altered Mental Status     from [facility], called for increased confusion, HD,          Nursing Notes, Past Medical Hx, Past Surgical Hx, Social Hx, Allergies, and Family Hx were all reviewed and agreed with or any disagreements were addressed in the HPI.    HPI: (Location, Duration, Timing, Severity, Quality, Assoc Sx, Context, Modifying factors)    Chief Complaint of altered mental status    This is a  66 y.o. male who presents with altered mental status from [facility], when they were doing rounds at the nursing facility this morning they found the patient in a pressure-like position on the floor leaning over his bed, patient was more confused than normal, patient does have a history of end-stage renal disease he is on hemodialysis, he has a right subclavian Vas-Cath. He was at dialysis on Tuesday Thursday Saturday. Upon ED arrival the patient is awake and alert, he is oriented to his name and date of birth, he is not sure why he is even here, he is denying any acute complaints on exam.  No headache neck pain or neck stiffness. No chest pain put chest pain or shortness of breath. He is unsure of any recent illnesses, he states he is unable sure why he is here. He is denying any pain, weakness or neglect. No additional complaints. No additional aggravating relieving factors that I am aware of, patient presents awake and alert to himself and date of birth, confused on time and place but does not appear to be any acute distress or toxic appearance.     PastMedical/Surgical History:      Diagnosis Date    Anemia     Diabetes mellitus ([facility]     ESRD (end stage renal disease) on dialysis ([facility]     Tues-Thurs-Sat    Hyperlipidemia     Hypertension      History reviewed. No pertinent surgical history. Medications:  Previous Medications    ATORVASTATIN (LIPITOR) 20 MG TABLET    Take 1 tablet by mouth daily    CALCIUM ELEMENTAL (OSCAL) 500 MG TABS TABLET    Take 1 tablet by mouth daily    CARVEDILOL (COREG) 3.125 MG TABLET    Take 1 tablet by mouth 2 times daily (with meals)    CLOPIDOGREL (PLAVIX) 75 MG TABLET    Take 1 tablet by mouth daily    DARBEPOETIN VAHID-POLYSORBATE (ARANESP, ALB FREE, SURECLICK IJ)    Inject as directed Pt's wife unsure of dosage. Pt had infusion on 4/27. DOXAZOSIN (CARDURA) 1 MG TABLET    Take 1 tablet by mouth daily    INSULIN PEN NEEDLE 32G X 5 MM MISC    1 each by Does not apply route 5 times daily    LEVOTHYROXINE (SYNTHROID) 125 MCG TABLET    Take 137 mcg by mouth Daily     MEGESTROL (MEGACE) 40 MG/ML SUSPENSION    Take 15.6 mLs by mouth daily    NITROGLYCERIN (NITROSTAT) 0.4 MG SL TABLET    up to max of 3 total doses. If no relief after 1 dose, call 911. SEVELAMER (RENVELA) 800 MG TABLET    Take 1 tablet by mouth 3 times daily (with meals)         Review of Systems:  (2-9 systems needed)  Review of Systems   Constitutional: Negative for chills and fever. HENT: Negative for congestion. Eyes: Negative for photophobia. Respiratory: Negative for cough, shortness of breath and wheezing. Cardiovascular: Negative for chest pain. Gastrointestinal: Negative for abdominal pain, diarrhea, nausea and vomiting. Genitourinary: Negative for difficulty urinating, dysuria, frequency and hematuria. Musculoskeletal: Negative for back pain. Skin: Negative for color change. Neurological: Positive for weakness. Negative for numbness and headaches. Patient presents with mental status from [facility], when they were doing rounds at the nursing facility this morning they found the patient in a pressure-like position on the floor leaning over his bed. Psychiatric/Behavioral: Positive for confusion. Patient resides in [facility], he was sent to the ER for change in behavior       \"Positives and Pertinent negatives as per HPI\"    Physical Exam:  Physical Exam  Vitals and nursing note reviewed. Constitutional:       Appearance: He is well-developed. He is not diaphoretic. HENT:      Head: Normocephalic. Right Ear: External ear normal.      Left Ear: External ear normal.   Eyes:      General: No scleral icterus. Right eye: No discharge. Left eye: No discharge. Comments: Pupils are 3 mm round reactive, normal extraocular movement, patient has a slight droop looking to the right however he is able to move both eyes without difficulty. Cardiovascular:      Rate and Rhythm: Normal rate. Comments: Normal S1 and 2, peripheral pulses 2+, no edema observed  Pulmonary:      Effort: Pulmonary effort is normal. No respiratory distress. Abdominal:      Palpations: Abdomen is soft. Musculoskeletal:         General: Normal range of motion. Cervical back: Normal range of motion and neck supple. Skin:     General: Skin is warm. Capillary Refill: Capillary refill takes less than 2 seconds. Coloration: Skin is not pale. Neurological:      Mental Status: He is alert. GCS: GCS eye subscore is 4. GCS verbal subscore is 5. GCS motor subscore is 6. Cranial Nerves: Cranial nerves are intact. Sensory: Sensation is intact. Comments: According to the patient's wife the right-sided weakness is gotten worse over the past 5 days however he did have a stroke on May 22, 2022, he is been at the nursing facility and she feels like the patient has been declining. However, patient NIH was completed, he does have some deficit on the right side if he has right-sided facial droop, right tongue deviation, right arm and leg appear to be weaker than the left.    Psychiatric:         Behavior: Behavior normal.         MEDICAL DECISION MAKING    Vitals:    Vitals:    06/10/22 0809 06/10/22 0810 06/10/22 0811 06/10/22 1045   BP:   (!) 136/54 136/73   Pulse:  96  77   Resp:    12   Temp:       TempSrc:       SpO2: 95%   98%       LABS:  Labs Reviewed   CBC WITH AUTO DIFFERENTIAL - Abnormal; Notable for the following components:       Result Value    WBC 16.0 (*)     RBC 2.61 (*)     Hemoglobin 7.1 (*)     Hematocrit 22.2 (*)     RDW 18.4 (*)     Platelets 656 (*)     Neutrophils Absolute 13.6 (*)     Lymphocytes Absolute 0.9 (*)     All other components within normal limits   COMPREHENSIVE METABOLIC PANEL W/ REFLEX TO MG FOR LOW K - Abnormal; Notable for the following components:    Sodium 135 (*)     Chloride 97 (*)     Glucose 142 (*)     BUN 29 (*)     CREATININE 4.2 (*)     GFR Non- 14 (*)     GFR  17 (*)     Total Protein 6.0 (*)     Albumin 2.3 (*)     Albumin/Globulin Ratio 0.6 (*)     Alkaline Phosphatase 611 (*)     All other components within normal limits    Narrative:     CALL  [facility] tel. [phone],  Chemistry results called to and read back by Thi Wiggins RN, 06/10/2022  09:28, by Rebecca García   TROPONIN - Abnormal; Notable for the following components:    Troponin 0.80 (*)     All other components within normal limits    Narrative:     CALL  [facility] [phone],  Chemistry results called to and read back by Thi Wiggins RN, 06/10/2022  09:28, by Rebecca García   BLOOD GAS, VENOUS - Abnormal; Notable for the following components:    pH, Nikko 7.497 (*)     pCO2, Nikko 34.0 (*)     pO2, Nikko 108.8 (*)     Carboxyhemoglobin 2.6 (*)     All other components within normal limits   MAGNESIUM - Abnormal; Notable for the following components:    Magnesium 1.70 (*)     All other components within normal limits    Narrative:     [facility] tel. [phone],  Chemistry results called to and read back by Thi Wiggins RN, 06/10/2022  09:28, by Rebecca García   POCT GLUCOSE - Abnormal; Notable for the following 66 year(s)   Accession Number   [id number]         Room Number         2131   Corporate ID       [id number]           Swathi Griffiths, RT   Ordering Physician Debby Murray, MD             Physician           Lee Walker MD, [facility], [address]  Procedure Type of Study   TTE procedure:ECHOCARDIOGRAM COMPLETE 2D W DOPPLER W COLOR. Procedure Date Date: 05/19/2022 Start: 06:30 AM Study Location: [address] [place] [address] - Echo Lab Technical Quality: Adequate visualization Indications:Myocardial infarction. Patient Status: Routine Height: 70 inches Weight: 190 pounds BSA: 2.04 m2 BMI: 27.26 kg/m2 BP: 120/57 mmHg  Conclusions   Summary  Normal left ventricular systolic function with ejection fraction of 55-60%. No regional wall motion abnormalites are seen. Mildly dilated size left ventricle. Grade I diastolic dysfunction with normal filling pressure. Mild mitral regurgitation. Moderate to severe aortic stenosis. Systolic pulmonic artery pressure (SPAP) is normal estimated at 35 mmHg  (Right atrial pressure of 3 mmHg). Compared to previous study from 5-4-2018, there are no changes noted in left  ventricular function. Signature   ------------------------------------------------------------------  Electronically signed by Lee Walker MD, Caleb Baumann  (Interpreting physician) on 05/19/2022 at 07:39 AM  ------------------------------------------------------------------   Findings   Left Ventricle  Normal left ventricular systolic function with ejection fraction of 55-60%. No regional wall motion abnormalites are seen. Mildly dilated size left ventricle with normal wall thickness. Grade I diastolic dysfunction with normal filling pressure. Compared to previous study from 5-4-2018 no changes noted in left  ventricular function.    Mitral Valve  Mild thickening of leaflets of mitral valve. No mitral stenosis. Mild mitral regurgitation. Left Atrium  The left atrium is normal in size. Aortic Valve  The aortic valve is thickened/calcified with decreased leaflet mobility  consistent with aortic stenosis. The aortic valve area is calculated at 0.70 cm2 with a maximum pressure  gradient of 61 mmHg and a mean pressure gradient of 29 mmHg. This is c/w  moderate to severe aortic stenosis. No aortic regurgitation. Aorta  The aortic root is normal in size. Right Ventricle  The right ventricle is normal in size and function. TAPSE is measured at 25 mm. S'prime velocity is measured at 13 cm/s. Tricuspid Valve  Tricuspid valve is structurally normal.  Trivial tricuspid regurgitation. Systolic pulmonic artery pressure (SPAP) is normal estimated at 35 mmHg  (Right atrial pressure of 3 mmHg). Right Atrium  The right atrium is normal in size at 16 cm2. Pulmonic Valve  The pulmonic valve is not well visualized. Trivial pulmonic regurgitation present. Pericardial Effusion  No pericardial effusion noted. Pleural Effusion  No pleural effusion. Miscellaneous  IVC size is normal (<2.1cm) and collapses > 50% with respiration consistent  with normal RA pressure (3mmHg).   M-Mode/2D Measurements (cm)   LV Diastolic Dimension: 9.45 cm LV Systolic Dimension: 3.8 cm  LV Septum Diastolic: 6.55 cm  LV PW Diastolic: 7.25 cm        AO Root Dimension: 3.2 cm                                  AV Cusp Separation: 1 cm                                  LA Dimension: 3.4 cm  LVOT: 2.2 cm                    LA Area: 17.7 cm2                                  LA volume/Index: 52.6 ml /26 ml/m2  Doppler Measurements   AV Peak Velocity: 390 cm/s     MV Peak E-Wave: 65.6 cm/s  AV Peak Gradient: 60.84 mmHg   MV Peak A-Wave: 84.4 cm/s  AV Mean Gradient: 23 mmHg      MV E/A Ratio: 0.78  LVOT Peak Velocity: 116 cm/s  AV Area (Continuity):0.7 cm2   TR Velocity:284 cm/s  TR Gradient:32.26 mmHg  Estimated RAP:3 mmHg  Estimated RVSP: 35 mmHg  E' Septal Velocity: 6.64 cm/s  E' Lateral Velocity: 8.38 cm/s  E/E' ratio: 8  PV Peak Velocity: 110 cm/s  PV Peak Gradient: 4.84 mmHg   Aortic Valve   Peak Velocity: 390 cm/s    Mean Velocity: 222 cm/s  Peak Gradient: 60.84 mmHg  Mean Gradient: 23 mmHg  Area (continuity): 0.7 cm2  AV VTI: 135 cm   Cusp Separation: 1 cm  Aorta   Aortic Root: 3.2 cm  LVOT Diameter: 2.2 cm      CT HEAD WO CONTRAST    Result Date: 5/17/2022  EXAMINATION: CT OF THE HEAD WITHOUT CONTRAST  5/17/2022 4:46 pm TECHNIQUE: CT of the head was performed without the administration of intravenous contrast. Automated exposure control, iterative reconstruction, and/or weight based adjustment of the mA/kV was utilized to reduce the radiation dose to as low as reasonably achievable. COMPARISON: None. HISTORY: ORDERING SYSTEM PROVIDED HISTORY: generalized fatigue, frequent falls TECHNOLOGIST PROVIDED HISTORY: If patient is on cardiac monitor and/or pulse ox, they may be taken off cardiac monitor and pulse ox, left on O2 if currently on. All monitors reattached when patient returns to room. Has a \"code stroke\" or \"stroke alert\" been called? ->No Reason for exam:->generalized fatigue, frequent falls Decision Support Exception - unselect if not a suspected or confirmed emergency medical condition->Emergency Medical Condition (MA) Reason for Exam: Generalized fatigue, Frequent falls FINDINGS: BRAIN/VENTRICLES: There is no acute intracranial hemorrhage, mass effect or midline shift. No abnormal extra-axial fluid collection. The gray-white differentiation is maintained without evidence of an acute infarct. There is no evidence of hydrocephalus. Generalized prominence of the ventricular and cisternal spaces consistent with cerebral atrophy. Decreased attenuation in the periventricular and subcortical white matter most consistent with small vessel ischemic change. Intracranial atherosclerotic vascular calcification.  ORBITS: The visualized portion of the orbits demonstrate no acute abnormality. SINUSES: The visualized paranasal sinuses and mastoid air cells demonstrate no acute abnormality. SOFT TISSUES/SKULL:  No acute abnormality of the visualized skull or soft tissues. No acute intracranial abnormality. Generalized cerebral atrophy and chronic small vessel white matter ischemic changes. RECOMMENDATIONS: Unavailable     XR CHEST PORTABLE    Result Date: 6/5/2022  EXAMINATION: ONE XRAY VIEW OF THE CHEST 6/5/2022 3:39 am COMPARISON: Chest radiograph dated May 17, 2022 HISTORY: ORDERING SYSTEM PROVIDED HISTORY: fever TECHNOLOGIST PROVIDED HISTORY: Reason for exam:->fever Reason for Exam: fever and fatigue, pt to ED for low H&H FINDINGS: Cardiomegaly is noted. A right-sided dialysis catheter is in place. There is no focal consolidation, pleural effusion, or pneumothorax. There is no evidence of edema. No acute findings. XR CHEST PORTABLE    Result Date: 5/17/2022  EXAMINATION: ONE XRAY VIEW OF THE CHEST 5/17/2022 4:02 pm COMPARISON: Chest x-ray dated 04/06/2022 HISTORY: ORDERING SYSTEM PROVIDED HISTORY: weak TECHNOLOGIST PROVIDED HISTORY: Reason for exam:->weak Reason for Exam: fatigue, weak FINDINGS: Right-sided catheter tip in the SVC. Cardiomegaly. No focal lung consolidation. No pleural effusion or pneumothorax. Visualized osseous structures are unremarkable. No focal lung consolidation.      VL DUP CAROTID BILATERAL    Result Date: 5/24/2022  Carotid Duplex Study  Demographics   Patient Name       Mariola Early   Date of Study      05/24/2022         Gender              Male   Patient Number     [id number]         Date of Birth       1950   Visit Number       [id number]          Age                 66 year(s)   Accession Number   [id number]         Room Number         0398   Corporate ID       [id number]           Sonographer         Mak Sellers TONIE, RVT   Ordering Physician Roddy Wright,  Interpreting        Sushil Treviño Vascular                     CNP                Physician           Readers                                                            Aguilar Richards MD  Procedure Type of Study:   Cerebral:Carotid, VL CAROTID DUPLEX BILATERAL. Vascular Sonographer Report  Indications for Study:CVA. Additional Indications:Acute CVA, patient having difficultly speaking for past three days. Carotid Artery Duplex Scan: Transverse and longitudinal grayscale and color imaging of the extracranial carotid system including Doppler spectral waveform analysis. Impressions Right Impression Technically difficult due to patients inability to position correctly. The right internal carotid artery reveals a <50% diameter reducing stenosis, however, this may be underestimated due to calcific shadowing. The right vertebral artery demonstrates normal antegrade flow. The right subclavian artery is visualized and demonstrates multiphasic flow. Left Impression The left internal carotid artery reveals a 50-69% diameter reducing stenosis, however, this may be underestimated due to calcific shadowing. The left vertebral artery demonstrates normal antegrade flow. The left subclavian artery is visualized and demonstrates multiphasic flow. There is no previous exam for comparison. Conclusions   Summary   The right internal carotid artery reveals a <50% diameter reducing  stenosis. The left internal carotid artery reveals a 50-69% diameter reducing  stenosis. The bilateral vertebral arteries have normal antegrade flow. Signature   ------------------------------------------------------------------  Electronically signed by Kavon Fernández MD (Interpreting  physician) on 05/24/2022 at 09:53 PM  ------------------------------------------------------------------  Patient Status:Routine.  Study Location:[facility] [address] - Vascular Lab. Technical Quality:Limited visualization due to patient immobility. Risk Factors   - The patient's risk factor(s) include: diabetes mellitus and treated     arterial hypertension. Plaque   - A plaque was found in the Right Prox ICA. The plaque characteristics are: high echogenicity and moderate severity. There is evidence of calcified plaque. - A plaque was found in the Left Prox ICA. The plaque characteristics are: medium echogenicity and severe. There is evidence of calcified plaque. There is evidence of intimal thickening. Velocities are measured in cm/s ; Diameters are measured in mm Carotid Right Measurements +---------------+----+----+-----+----+ ! Location       ! PSV ! EDV ! Angle! RI  ! +---------------+----+----+-----+----+ ! Prox CCA       !75.2!11. 8!60   !0.84! +---------------+----+----+-----+----+ ! Mid CCA        !60.3!8.7 ! 60   !0.86! +---------------+----+----+-----+----+ ! Dist CCA       !65.9!9.94!60   !0.85! +---------------+----+----+-----+----+ ! Prox ICA       !106 !14. 9!60   !0.86! +---------------+----+----+-----+----+ ! Mid ICA        !96. 5!17. 7! 60   !0.82! +---------------+----+----+-----+----+ ! Dist ICA       !80.2!12. 2!60   !0.85! +---------------+----+----+-----+----+ ! Prox ECA       !102 !0   !60   !1   ! +---------------+----+----+-----+----+ ! Vertebral      !45  !3.43!60   !0.92! +---------------+----+----+-----+----+ ! Prox Subclavian! 249 !    !60   !    ! +---------------+----+----+-----+----+   - There is antegrade vertebral flow noted on the right side. - Additional Measurements:ICAPSV/CCAPSV 1.76. ICAEDV/CCAEDV 1.5. Carotid Left Measurements +---------------+----+----+-----+----+ ! Location       ! PSV ! EDV ! Angle! RI  ! +---------------+----+----+-----+----+ ! Prox CCA       !167 !17. 8!60   !0.89! +---------------+----+----+-----+----+ ! Mid CCA        !98.8!11. 6!60   !0.88! +---------------+----+----+-----+----+ ! Dist CCA       !127 !20. 3! 60   !0.84! acute abnormality. SINUSES: The visualized paranasal sinuses and mastoid air cells are well aerated. BONES/SOFT TISSUES: The bone marrow signal intensity appears normal. The soft tissues demonstrate no acute abnormality. 1. Small acute to subacute cortical infarct involving the right brachium pontis/cerebellar hemisphere. 2. No significant mass effect or acute hemorrhage. 3. Disproportionate enlargement of the ventricles relative to the degree of parenchymal volume loss, which may reflect more central white matter volume loss versus NPH in the appropriate clinical setting. 4. Chronic small vessel ischemic changes and remote lacunar infarcts throughout the sadia. NM Cardiac Stress Test Nuclear Imaging    Result Date: 5/20/2022  Cardiac Perfusion Imaging  Demographics   Patient Name       Gaye Owens   Date of Study      05/20/2022         Gender              Male   Patient Number     [id number]         Date of Birth       1950   Visit Number       [id number]          Age                 66 year(s)   Accession Number   [id number]         Room Number         7550   Corporate ID       [id number]           NM Technician       Isabel Harkins,                                                            [facility]   Nurse              Erum Rangel,  Interpreting        Ok Stewart.                     RN                 Physician           Jenna Devries MD   Ordering Physician Hardik Keller MD   The procedure was explained in detail to the patient. Risks,  complications and alternative treatments were reviewed. Written consent  was obtained. Procedure Procedure Type:   Nuclear Stress Test:Pharmacological, NM MYOCARDIAL SPECT REST EXERCISE OR  RX   Study location: [facility] - Nuclear Medicine   Indications: Chest pain. Hospital Status: Inpatient.   Height: 70 inches Weight: 190 pounds  Risk Factors   The patient risk factors include:treated hypercholesterolemia, treated  hypertension and diabetes mellitus. Conclusions   Summary  There is normal isotope uptake at stress and rest. There is no evidence of  myocardial ischemia or scar. Normal myocardial thickening and wall motion. Borderline cardiac size by volumes. Normal LV function with Post-stress LVEF  of 57%. Overall findings represent a low risk scan. Stress Protocols   Resting ECG  Normal sinus, first degree AV block,  normal axis, RBBB, T wave inversions  inferior and anterolateral leads,  consider ischemia. Resting HR:73 bpm  Resting BP:109/47 mmHg  Stress Protocol:Pharmacologic - Lexiscan's  Peak HR:85 bpm                   HR/BP product:9180  Peak BP:108/43 mmHg  Predicted HR: 148 bpm  % of predicted HR: 57  Test duration: 4 min  Reason for termination:Completed   ECG Findings  SInus with intermittent RBBB noted. Arrhythmias  None. Symptoms  Patient developed shortness of breath and  dizziness, likely related to lexiscan. Symptoms  resolved with rest.   Complications  Procedure complication was none. Stress Interpretation  There is normal isotope uptake at stress and rest.  There is no evidence of myocardial ischemia or  scar. Normal myocardial thickening and wall  motion. Borderline cardiac size by volumes. Normal  LV function with Post-stress LVEF of 57%. TID  1.07. Procedure Medications   - Lexiscan I.V. 0.4 mg.  Imaging Protocols   - One Day   Rest                          Stress   Isotope:Myoview/Tetrofosmin   Isotope: Myoview/Tetrofosmin  Isotope dose:11.5 mCi         Isotope dose:32.6 mCi  Administration Route:I.V.      Administration Route:I.V.  Date:05/20/2022 07:20         Date:05/20/2022 08:30                                 Technique:      Gated  Imaging Results   Applied corrections   - Attenuation correction  applied     Stress ejection    Ejection fraction:56 %    EDV :151 ml    ESV :67 ml    Stroke volume :84 ml    LV mass :173 gr  Medical History   Additional Medical History   HD Patient   Signatures   ------------------------------------------------------------------  Electronically signed by Jenna Devries MD (Interpreting  physician) on 05/20/2022 at 10:25  ------------------------------------------------------------------           [address] [facility] [address] / ED COURSE:    Because of high probability of sudden clinical deterioration of the patient's condition and risk of further deterioration, critical care time required my full attention to the patient's condition; which included chart data review, documentation, medication ordering, reviewing the patient's old records, reevaluation patient's cardiac, pulmonary and neurological status. Reevaluation of vital signs. Consultations with ED attending and admitting physician. Ordering, interpreting reviewing diagnostic testing. Therefore a critical care time was 35 minutes of direct attention to the patient's condition did not include time spent on procedures. PROCEDURES:   Procedures    NIH Stroke Scale  Interval: Baseline  Level of Consciousness (1a): Alert (very sleepy)  LOC Questions (1b): Answers one correctly  LOC Commands (1c): Performs one task correctly  Best Gaze (2): Normal  Visual (3):  (pt unable to do d/t being sleepy)  Facial Palsy (4): (!) Minor paralysis  Motor Arm, Left (5a): No drift  Motor Arm, Right (5b): Some effort against gravity  Motor Leg, Left (6a): No drift  Motor Leg, Right (6b):  Some effort against gravity  Limb Ataxia (7): Absent  Sensory (8): (!) Mild to Moderate (pt not staying awake for exam)  Best Language (9): No aphasia  Dysarthria (10): Normal  Extinction and Inattention (11): (!) Profound jose miguel-inattention or extinction to more than one modality (pt unable to perform)  Total: 7      Patient was given:  Medications   magnesium sulfate 1000 mg in dextrose 5% 100 mL IVPB (has no administration in time range)   iopamidol (ISOVUE-370) 76 % injection 75 mL (75 mLs IntraVENous Given 6/10/22 documentation for EKG interpretation note. At 0909 I spoke with Dr. Elder Melton in regards to the CTA head and neck, as she has evaluated it the patient does have some pretty severe stenosis in the proximal left ICA right vertebral artery V4 segment is not well visualized, basilar artery appear diminutive may be congenital.  Remaining major intracranial arteries appear patent without stenosis. .  Chest x-ray shows mild vascular congestion. Blood gas shows no acute respiratory acidosis. CBC shows a white count of 16,000, H&H is 7.1 and 22.2 I do believe this is related to patient having end-stage renal disease on hemodialysis as this trends to be normal for the patient, not exactly sure the white count is coming from unless this is a new inflammatory response related to the patient's presentation today, his vital signs are stable and I have little suspicion for sepsis as he is afebrile. Metabolic panel is consistent with his end-stage renal disease as his creatinine is 4.2, BUN is 29 and GFR 14. This would correlate why his troponin is elevated at 0.80. His magnesium is slightly low at 1.7, we can replace this intravenously. However I did speak with stroke team multiple times, patient was ordered aspirin and Plavix however, because of the increased weakness on the right side and more of a deficit, I discussed with neurology on-call and myself along with attending physician and the patient's wife we agreed patient will need to be admitted for further evaluation and monitoring of this. I then paged hospitalist on-call for admission. The patient tolerated their visit well. I evaluated the patient. The physician was available for consultation as needed. The patient and / or the family were informed of the results of any tests, a time was given to answer questions, a plan was proposed and they agreed with plan.   Therefore, patient will be admitted to the hospital for further evaluation management of care.    CLINICAL IMPRESSION:  1. Altered mental status, unspecified altered mental status type    2. Right-sided sensory deficit present    3. Right sided weakness    4.  Confusion        DISPOSITION Decision To Admit 06/10/2022 09:14:02 AM      PATIENT REFERRED TO:  [address] [place]  [phone]          DISCHARGE MEDICATIONS:  New Prescriptions    No medications on file       DISCONTINUED MEDICATIONS:  Discontinued Medications    No medications on file              (Please note the MDM and HPI sections of this note were completed with a voice recognition program.  Efforts were made to edit the dictations but occasionally words are mis-transcribed.)    Electronically signed, VIVIEN Marrero CNP,          VIVIEN Marrero CNP  06/10/22 5878

## 2022-06-10 NOTE — ED PROVIDER NOTES
I independently performed a history and physical on Kaylen Manzo. All diagnostic, treatment, and disposition decisions were made by myself in conjunction with the advanced practice provider/resident. For further details of Farhat Li emergency department encounter, please see the LISBETH/resident's documentation. I personally saw the patient and performed a substantive portion of the visit including all aspects of the medical decision making. Briefly, this is a 76-year-old male with history of end-stage renal disease on dialysis, anemia of chronic disease, hypertension, recent stroke in May 2022 with cognitive deficits presenting for evaluation of altered mental status, concern for stroke. He was last seen normal last night. On exam, patient is leaning towards the right side. Blood pressure is in the 600L systolics otherwise hemodynamically stable no respiratory distress. CT imaging reveals evolving infarct. Because he has new right-sided weakness, recommend admission for continued management, neurology evaluation, family is agreeable with this plan. I personally saw the patient and independently provided 10 minutes of non-concurrent critical care out of the total shared critical care time provided. EKG  The Ekg interpreted by myself in the emergency department in the absence of a cardiologist.  normal sinus rhythm with a rate of 92  Axis is   Normal  QTc is  487  Intervals and Durations are unremarkable. No specific ST-T wave changes appreciated. ST changes, T wave inversions leads I to III aVF, V4 through V6 not significant change compared to prior EKG dated 17 May 2022  No evidence of acute ischemia. Comment: Please note this report has been produced using speech recognition software and may contain errors related to that system including errors in grammar, punctuation, and spelling, as well as words and phrases that may be inappropriate.  If there are any questions or concerns please feel free to contact the dictating provider for clarification.      Joseph Napoles MD  06/10/22 8930       Joseph Napoles MD  06/10/22 3820

## 2022-06-10 NOTE — PROGRESS NOTES
Speech Language Pathology  Clinical Bedside Swallow Assessment  Facility/Department: [facility] B3 - MED SURG        Recommendations:  Diet recommendation: IDDSI 7 Regular Solids; IDDSI 0 Thin Liquids; Meds whole with thin liquids  Instrumentation: Not clinically indicated at this time. Will continue to monitor for further need. Risk management: upright for all intake, stay upright for at least 30 mins after intake, small bites/sips, oral care 2-3x/day to reduce adverse affects in the event of aspiration, increase physical mobility as able, slow rate of intake, general aspiration precautions and hold PO and contact SLP if s/s of aspiration or worsening respiratory status develop.        Manuel Hill  : 1950 (72 y.o.)   MRN: [id number]  [facility]  ADMISSION DATE: 6/10/2022  PATIENT DIAGNOSIS(ES): Confusion [R41.0]  Right-sided sensory deficit present [R44.9]  Right sided weakness [R53.1]  Acute CVA (cerebrovascular accident) ([facility] [I63.9]  Altered mental status, unspecified altered mental status type [R41.82]  Chief Complaint   Patient presents with    Altered Mental Status     from [facility], called for increased confusion, HD,      Patient Active Problem List    Diagnosis Date Noted    Iron deficiency anemia     Acute infective pericarditis     ESRD (end stage renal disease) ([facility] [address] 06/10/2022    Acute CVA (cerebrovascular accident) ([facility] [address] 06/10/2022    Left carotid stenosis 06/10/2022    Severe aortic stenosis 06/10/2022    Cognitive impairment     Abnormal ECG     PVC (premature ventricular contraction)     Murmur     Anemia in other chronic diseases classified elsewhere     Weakness 2022    Elevated troponin 2022    NSTEMI (non-ST elevated myocardial infarction) ([address] 2018    Chronic kidney disease, stage 5 ([address] 2018    Anemia of chronic renal failure 2018    Crescentic glomerulonephritis 2018    Sepsis ([address] 2018    ARF (acute renal failure) ([facility] 03/30/2018    Pulmonary nodule     Type 2 diabetes mellitus with renal complication ([facility] 47/99/2328    Essential hypertension 02/16/2018    Hypothyroidism 02/16/2018    Mixed hyperlipidemia 02/16/2018    Tobacco use disorder 02/16/2018    Hyponatremia 02/16/2018     Past Medical History:   Diagnosis Date    Anemia     Diabetes mellitus ([facility] [address]     ESRD (end stage renal disease) on dialysis ([facility]     Tues-Thurs-Sat    Hyperlipidemia     Hypertension      History reviewed. No pertinent surgical history. No Known Allergies    DATE ONSET: 06/10/2022    Date of Evaluation: 6/10/2022   Evaluating Therapist: JASEN Narayan    Chart Reviewed: : [x] Yes [] No    Current Diet: Diet NPO    Recent Chest Radiography: [x] Chest XR   [] CT of Chest  Date: 06/10/2022  Impressions  Impression   Mild vascular congestion is stable. Pain: The patient does not complain of pain     Reason for Referral  Cathleen Queen was referred for a bedside swallow evaluation to assess the efficiency of their swallow function, identify signs and symptoms of aspiration and make recommendations regarding safe dietary consistencies, effective compensatory strategies, and safe eating environment. Assessment    Medical record review/interview: Per MD note: \"This is a  66 y.o. male who presents with altered mental status from [facility], when they were doing rounds at the nursing facility this morning they found the patient in a pressure-like position on the floor leaning over his bed, patient was more confused than normal, patient does have a history of end-stage renal disease he is on hemodialysis, he has a right subclavian Vas-Cath. He was at dialysis on Tuesday Thursday Saturday. Upon ED arrival the patient is awake and alert, he is oriented to his name and date of birth, he is not sure why he is even here, he is denying any acute complaints on exam.  No headache neck pain or neck stiffness.   No chest pain put chest pain or shortness of breath. He is unsure of any recent illnesses, he states he is unable sure why he is here. He is denying any pain, weakness or neglect. No additional complaints. No additional aggravating relieving factors that I am aware of, patient presents awake and alert to himself and date of birth, confused on time and place but does not appear to be any acute distress or toxic appearance. \"     Predisposing dysphagia risk factors: Hx of CVA  Clinical signs of possible chronic dysphagia: N/A  Precipitating dysphagia risk factors: acute neurological event     Patient Complaints:  Odynophagia: []? Yes [x]? No  Globus Sensation: []? Yes [x]? No  SOB with PO intake: []? Yes [x]? No  Increased WOB with PO intake: []? Yes [x]? No  Reflux Sx's: []? Yes [x]? No  Weight loss: []? Yes [x]? No  Coughing/Choking with PO intake: []? Yes [x]? No  Reduced Appetite: [x]? Yes []? No     Additional Reported Symptoms/Complaints/Hospital Course: Pt denies concerns regarding swallowing or aspiration at this time. Vitals/labs:   Temp: 98.1  SpO2: 96  RR: 16  BP: 136/77  HR: 66      CBC:   Recent Labs     06/10/22  0835   WBC 16.0*   HGB 7.1*   *      BMP:  Recent Labs     06/10/22  0835   *   K 3.5   CL 97*   CO2 27   BUN 29*   CREATININE 4.2*   GLUCOSE 142*          Cranial nerve exam:   CN V (trigeminal): ophthalmic, maxillary, and mandibular facial sensation- WFL  CN VII (facial): Impaired L&R  CN IX/X (glossopharyngeal/vagus): MPT: Reduced; pitch range: WFL; vocal quality: WFL; cough: Weak- perceptually, Non-Productive and Dry  CN XII (hypoglossal):  Impaired L  Intermittently  Laryngeal function exam:   Secretions: oral mucosa was pink and dry with white coating on tongue  Vocal quality: See CN exam above  MPT: See CN exam above  S/Z ratio: SOFIA d/t pt inability to follow commands  Pitch range: See CN exam above  Cough: See CN exam above    Oral Care Status:    [] Oral Care [facility]  [x] Poor oral care status  [] Edentulous  [] Upper Dentures  [] Lower Dentures  [] Missing/Broken Teeth  [] Evidence of dental cavities/carries    PO trials:   IDDSI 0 (thin) teaspoon, cup, straw: no anterior bolus, suspect functional A-P bolus transit, swallow timing subjectively appears timely, no clinical s/s of aspiration. IDDSI 4 (puree): suspect functional A-P bolus transit, swallow timing subjectively appears timely, oral clearance WFL, no clinical s/s of aspiration. IDDSI 7 (regular): functional mastication, suspect functional A-P bolus transit, swallow timing subjectively appears timely, good oral clearance, no clinical s/s of aspiration. 3 oz water: PASS    Impressions: No clinical s/s of oropharyngeal dysphagia. Given suspected CVA and hx of CVA SLP will continue to follow. Prognosis is good. Given no clinical s/s of aspiration at bedside pt is safe for oral diet at this time. Recommendations:  Diet recommendation: IDDSI 7 Regular Solids; IDDSI 0 Thin Liquids; Meds whole with thin liquids  Instrumentation: Not clinically indicated at this time. Will continue to monitor for further need. Risk management: upright for all intake, stay upright for at least 30 mins after intake, small bites/sips, oral care 2-3x/day to reduce adverse affects in the event of aspiration, increase physical mobility as able, slow rate of intake, general aspiration precautions and hold PO and contact SLP if s/s of aspiration or worsening respiratory status develop.      Prognosis: Good    Recommended Intervention:   [x] Dysphagia tx  [] Videostroboscopy                      [] NPO   [] MBS       [] Speech/Cog Eval    [] Therapeutic PO Trials     [] Ice Chips   [] Other:  [] FEES                                                 Dysphagia Therapeutic Intervention:   [x]  Bolus control Exercises  []  Oral Motor Exercises  []  [employer] Protocol  []  Thermal Stimulation  []  Oral Care    []  Vital Stim/NMES  []  Laryngeal Exercises  [x] Patient/Family Education  []  Pharyngeal Exercises  []  Therapeutic PO trials with SLP  [x]  Diet tolerance monitoring  []  Other:     Referrals:  [] ENT    [] PT  [] Pulmonology [] GI  [x] Neurology  [] RD  [] OT   []     Goals:  Short Term Goals:  Timeframe for Short Term Goals: (5 days 06/15/2022)  Goal 1: The patient will tolerate recommended diet with no clinical s/s of aspiration 5/5  Goal 2: The patient/caregiver will demonstrate understanding of compensatory swallow strategies, for improved swallow safety    Long Term Goals:   Timeframe for Long Term Goals: (7 days 06/17/2022)  Goal 1: The patient will tolerate least restrictive diet with no clinical s/s of aspiration or worsening respiratory/pulmonary status    Treatment:  Skilled instruction completed with patient re: evidenced based practice regarding recommendations and POC, importance of oral care to reduce adverse affects in the event of aspiration, and instruction of recommended compensatory strategies developed based upon clinical exam. Pt able to recall/demonstrate compensatory strategies with min cues. Pt Education: SLP educated the patient re: Role of SLP, rationale for completion of assessment, results of assessment, recommendations and POC  Pt Education Response: verbalized understanding and would benefit from ongoing education    Duration/Frequency of Tx: 2-4x/wk    Individuals Consulted:   [x]  Patient     []  NP         []  RN   []  RD                   []  MD      []  Family Member                        []  PA    []  Other:      Safety Devices / Report:  [x]  All fall risk precautions in place []  Safety handoff completed with RN  []  Bed alarm in place  [x]  Left in bed     []  Chair alarm in place  []  Left in chair   [x]  Call light in reach   []  Other:                              Total Treatment Time / Charges       Time in Time out Total Time / units   Swallow Eval/Tx Time  1430 1456 26 min/2 units Signature:    Marjan Ceballos  SLP  Clinician    Co-Signing Supervisor:  Mitchell Sullivan M.A., [address] [place] [address] Pathologist  Phone: [phone], [phone]

## 2022-06-10 NOTE — ED NOTES
Pt in from [facility] pt in today d/t found down on his knees at his bed possible un wittiness fall and AMS. Pt is orient to self and time. Pt with noticeable face drooping and and right sided weakness and leaning to the right side. Per wife she state that the leaning, weakness and facial drooping started about 5 days ago. Pt with dry MM. Pt with dialysis cath in place and gets his dialysis on Tuesday, Thursday and Saturday. Pt is in end stage renal disease. pt with fall precautions and pt on monitor. Wife state that pt had a stroke when he was last inpatient however his weakness has been increasing on the right side. Wife state that pt gets his medications crushed and that he drinks out of a cup NO straw. Pt is followed by facility speech team for eating and drinking.        Sandra Peralta RN  06/10/22 Jeri Mosley, RN  06/10/22 Jeri Mosley, RN  06/10/22 5173

## 2022-06-10 NOTE — ED NOTES
Patient identified as a positive fall risk on the ED triage fall screening. Patient placed in fall precautions which includes:  yellow fall risk bracelet on wrist and yellow socks on feet. Patient instructed on importance of not getting out of bed or ambulating without assistance for safety. Pt verbalized understanding.      Jennifer Bennett RN  06/10/22 7342

## 2022-06-10 NOTE — CONSULTS
Telemedicine Consult Note   Stroke Team                Patient Name: Maxi Angeles (5 y.o. male)  MRN: [id number]  : 1950  Admission Date: 6/10/2022   Current Date: 06/10/22    STROKE TIMELINE  Last Known Well: 7:30 PM last night  ED arrival time: 8:06 AM    This is a 66 y.o.male with HTN, DM, ESRD (recently started on HD in April) and recent R pontine and cerebellar stroke, who presented with altered mental status. Pt was found on the floor in a prayer-like position. ED provider evaluated and found patient to have R facial droop, R sided weakness and aphasia. ED provider called SNF and found [facility] last night and confirmed that patient is not known to have weakness - they noted no deficits after recent admission. CT head - no acute process  CTA head/neck - no LVO  ---> severe > 70% L ICA stenosis  ---> occluded or congenitally diminutive R vertebral artery  ---> diminutive basilar, but has bilateral fetal PCAs    No tpa 2/2 recent acute ischemic stroke (). No thrombectomy 2/2 no LVO. No plavix load 2/2 already on plavix. Additional Recommendations:   Right sided weakness  Carotid arterial disease - L ICA stenosis  Concern for new acute ischemic stroke. Ddx includes seizures, recrudescence. - Give ASA 81 mg  - Continue home plavix  - Permissive HTN  - Routine MRI brain  - NPO until passing bedside dysphagia screen or formal swallow  - PT/OT/SLP  - Consult neurology - may need EEG  - Evaluate for metabolic disturbances contributing to worsening neurology symptoms  - Consult surgeon for ICA stenosis - inpatient vs outpatient       For questions, call the  Stroke Team at [phone].     Dr. Kay Romero Stroke Team          Telemedicine Time: 32 minutes

## 2022-06-10 NOTE — ACP (ADVANCE CARE PLANNING)
Advance Care Planning     General Advance Care Planning (ACP) Conversation    Date of Conversation: 6/10/2022  Conducted with: Patient with Decision Making Capacity   Healthcare Decision Maker: Next of Kin by law (only applies in absence of above) (name) wife, [address] Decision Maker:    Primary Decision Maker: Deepti Rodriguez Spouse - [phone]    Secondary Decision Maker: Laly Augustine - Child - [phone]  Click here to complete Healthcare Decision Makers including selection of the Healthcare Decision Maker Relationship (ie \"Primary\"). Today we documented Decision Maker(s) consistent with Legal Next of Kin hierarchy.       Length of Voluntary ACP Conversation in minutes:  <16 minutes (Non-Billable)    KRYSTIN Elmore

## 2022-06-10 NOTE — PLAN OF CARE
Problem: Discharge Planning  Goal: Discharge to home or other facility with appropriate resources  Outcome: Progressing  Flowsheets  Taken 6/10/2022 1539  Discharge to home or other facility with appropriate resources: Identify barriers to discharge with patient and caregiver  Taken 6/10/2022 1343  Discharge to home or other facility with appropriate resources: Identify barriers to discharge with patient and caregiver     Problem: Skin/Tissue Integrity  Goal: Absence of new skin breakdown  Description: 1. Monitor for areas of redness and/or skin breakdown  2. Assess vascular access sites hourly  3. Every 4-6 hours minimum:  Change oxygen saturation probe site  4. Every 4-6 hours:  If on nasal continuous positive airway pressure, respiratory therapy assess nares and determine need for appliance change or resting period. Outcome: Progressing     Problem: SLP Adult - Impaired Swallowing  Goal: By Discharge: Advance to least restrictive diet without signs or symptoms of aspiration for planned discharge setting. See evaluation for individualized goals. 6/10/2022 1537 by JASEN Brunson  Note: SLP completed evaluation. Please refer to notes in EMR. Problem: Safety - Adult  Goal: Free from fall injury  Outcome: Progressing  Flowsheets (Taken 6/10/2022 1530)  Free From Fall Injury: Instruct family/caregiver on patient safety     Problem: Confusion  Goal: Confusion, delirium, dementia, or psychosis is improved or at baseline  Description: INTERVENTIONS:  1. Assess for possible contributors to thought disturbance, including medications, impaired vision or hearing, underlying metabolic abnormalities, dehydration, psychiatric diagnoses, and notify attending LIP  2. [place] high risk fall precautions, as indicated  3. Provide frequent short contacts to provide reality reorientation, refocusing and direction  4. Decrease environmental stimuli, including noise as appropriate  5.  Monitor and intervene to maintain adequate nutrition, hydration, elimination, sleep and activity  6. If unable to ensure safety without constant attention obtain sitter and review sitter guidelines with assigned personnel  7.  Initiate Psychosocial CNS and Spiritual Care consult, as indicated  Outcome: Progressing

## 2022-06-10 NOTE — ED NOTES
Code Stroke    @0813 Billy STEVENS called Code Stroke  @0813 [facility] Stroke Team   @0814 Kiel Dominique RN Took Pt to [facility] lab  @1602 Billy STEVENS talked to [address] from [facility] Stroke Team

## 2022-06-10 NOTE — CONSULTS
Consult placed    Damien:Luis Enrique   Date:6/10/2022,  Time:1:54 PM        Electronically signed by Zenaida Soto on 6/10/2022 at 1:54 PM

## 2022-06-10 NOTE — PROGRESS NOTES
4 Eyes Skin Assessment     The patient is being assess for   Admission    I agree that 2 RN's have performed a thorough Head to Toe Skin Assessment on the patient. ALL assessment sites listed below have been assessed. Areas assessed for pressure by both nurses: yes  [x]   Head, Face, and Ears   [x]   Shoulders, Back, and Chest, Abdomen  [x]   Arms, Elbows, and Hands   [x]   Coccyx, Sacrum, and Ischium  [x]   Legs, Feet, and Heels        Skin Assessed Under all Medical Devices by both nurses:  none              All Mepilex Borders were peeled back and area peeked at by both nurses:  Yes  Please list where Mepilex Borders are located:  none    R. Heel scab & left shin black scab. **SHARE this note so that the co-signing nurse is able to place an eSignature**    Co-signer eSignature: Electronically signed by Evon Mirza RN on 6/10/22 at 2:31 PM EDT    Does the Patient have Skin Breakdown related to pressure?   No              Papo Prevention initiated:  Yes   Wound Care Orders initiated:  Yes      [address]  nurse consulted for Pressure Injury (Stage 3,4, Unstageable, DTI, NWPT, Complex wounds)and New or Established Ostomies:  NA      Primary Nurse eSignature: Electronically signed by Yessy Shirley RN on 6/10/22 at 1:34 PM EDT

## 2022-06-10 NOTE — H&P
Hospital Medicine History & Physical      PCP: Juana Mcmahon MD    Date of Admission: 6/10/2022    Date of Service: Pt seen/examined on 6/10/2022 and Admitted to Inpatient with expected LOS greater than two midnights due to medical therapy. .    Chief Complaint:  Right sided weakness    History Of Present Illness:   66 y.o. male who presented to [facility] with right sided weakness. PMHx significant for DM2, ESRD, HTN, recent admission for CVA. Prior work-up included right/posterior CVA by MRI and some left carotid stenosis (not the symptomatic side). Cardiac work-up revealed severe AS and he was referred for TAVR. Discharged to [facility]. He returns after staff found in kneeling on the ground. He apparently had some new weakness on the right side compared to prior. LKW was the evening prior. He was a poor historian on presentation. Stroke team was contacted. CTA showed 70% left ICA stenosis, no acute findings. He was not a candidate for any TPA or intervention. At time of my evaluation, he reports ongoing subtle weakness of the RUE and RLE. No CP/SOB. Past Medical History:        Diagnosis Date    Anemia     Diabetes mellitus ([facility]     ESRD (end stage renal disease) on dialysis ([facility]     Tues-Thurs-Sat    Hyperlipidemia     Hypertension        Past Surgical History:    History reviewed. No pertinent surgical history. Medications Prior to Admission:   Prior to Admission medications    Medication Sig Start Date End Date Taking?  Authorizing Provider   doxazosin (CARDURA) 1 MG tablet Take 1 tablet by mouth daily 5/26/22   Leyla Garcia MD   clopidogrel (PLAVIX) 75 MG tablet Take 1 tablet by mouth daily 5/26/22   Leyla Garcia MD   megestrol (MEGACE) 40 MG/ML suspension Take 15.6 mLs by mouth daily 5/26/22   Leyla Garcia MD   sevelamer (RENVELA) 800 MG tablet Take 1 tablet by mouth 3 times daily (with meals)    Historical Provider, MD   carvedilol (COREG) 3.125 MG tablet Take 1 tablet by mouth 2 times daily (with meals) 5/10/18   Betsy Barba MD   atorvastatin (LIPITOR) 20 MG tablet Take 1 tablet by mouth daily 5/11/18   Betsy Barba MD   nitroGLYCERIN (NITROSTAT) 0.4 MG SL tablet up to max of 3 total doses. If no relief after 1 dose, call 911. 5/10/18   Betsy Barba MD   Darbepoetin Imtiaz-Polysorbate (ARANESP, ALB FREE, SURECLICK IJ) Inject as directed Pt's wife unsure of dosage. Pt had infusion on 4/27. Historical Provider, MD   calcium elemental (OSCAL) 500 MG TABS tablet Take 1 tablet by mouth daily 4/11/18   Ashley Mak MD   Insulin Pen Needle 32G X 5 MM MISC 1 each by Does not apply route 5 times daily 4/10/18   Ashley Mak MD   levothyroxine (SYNTHROID) 125 MCG tablet Take 137 mcg by mouth Daily     Historical Provider, MD       Allergies:   Patient has no known allergies. Social History:    TOBACCO:   reports that he quit smoking about 4 years ago. His smoking use included cigarettes. He smoked 0.50 packs per day. He has never used smokeless tobacco.  ETOH:   reports no history of alcohol use. E-Cigarettes/Vaping Use     Questions Responses    E-Cigarette/Vaping Use Never User    Start Date     Passive Exposure     Quit Date     Counseling Given     Comments             Family History:    History reviewed. No pertinent family history. REVIEW OF SYSTEMS:   Pertinent positives as noted in the HPI. All other systems reviewed with patient as able and negative. Physical Exam Performed:  BP (!) 147/72   Pulse 76   Temp 97.7 °F (36.5 °C) (Oral)   Resp 15   SpO2 96%   General appearance:  No apparent distress, appears stated age and cooperative. HEENT:  Pupils equal, round, and reactive to light. Conjunctivae/corneas clear. Neck:  Supple, no jugular venous distention. Trachea midline with full range of motion. Respiratory:  Normal respiratory effort. Clear to auscultation, bilaterally without Rales/Wheezes/Rhonchi.   Cardiovascular:  Regular rate and rhythm with RPVI  Procedure Type of Study   TTE procedure:ECHOCARDIOGRAM COMPLETE 2D W DOPPLER W COLOR. Procedure Date Date: 05/19/2022 Start: 06:30 AM Study Location: [address] [place] [address] - Echo Lab Technical Quality: Adequate visualization Indications:Myocardial infarction. Patient Status: Routine Height: 70 inches Weight: 190 pounds BSA: 2.04 m2 BMI: 27.26 kg/m2 BP: 120/57 mmHg  Conclusions   Summary  Normal left ventricular systolic function with ejection fraction of 55-60%. No regional wall motion abnormalites are seen. Mildly dilated size left ventricle. Grade I diastolic dysfunction with normal filling pressure. Mild mitral regurgitation. Moderate to severe aortic stenosis. Systolic pulmonic artery pressure (SPAP) is normal estimated at 35 mmHg  (Right atrial pressure of 3 mmHg). Compared to previous study from 5-4-2018, there are no changes noted in left  ventricular function. Signature   ------------------------------------------------------------------  Electronically signed by Italia Mayberry MD, Sherwin Camper  (Interpreting physician) on 05/19/2022 at 07:39 AM  ------------------------------------------------------------------   Findings   Left Ventricle  Normal left ventricular systolic function with ejection fraction of 55-60%. No regional wall motion abnormalites are seen. Mildly dilated size left ventricle with normal wall thickness. Grade I diastolic dysfunction with normal filling pressure. Compared to previous study from 5-4-2018 no changes noted in left  ventricular function. Mitral Valve  Mild thickening of leaflets of mitral valve. No mitral stenosis. Mild mitral regurgitation. Left Atrium  The left atrium is normal in size. Aortic Valve  The aortic valve is thickened/calcified with decreased leaflet mobility  consistent with aortic stenosis. The aortic valve area is calculated at 0.70 cm2 with a maximum pressure  gradient of 61 mmHg and a mean pressure gradient of 29 mmHg.  This is c/w  moderate to severe aortic stenosis. No aortic regurgitation. Aorta  The aortic root is normal in size. Right Ventricle  The right ventricle is normal in size and function. TAPSE is measured at 25 mm. S'prime velocity is measured at 13 cm/s. Tricuspid Valve  Tricuspid valve is structurally normal.  Trivial tricuspid regurgitation. Systolic pulmonic artery pressure (SPAP) is normal estimated at 35 mmHg  (Right atrial pressure of 3 mmHg). Right Atrium  The right atrium is normal in size at 16 cm2. Pulmonic Valve  The pulmonic valve is not well visualized. Trivial pulmonic regurgitation present. Pericardial Effusion  No pericardial effusion noted. Pleural Effusion  No pleural effusion. Miscellaneous  IVC size is normal (<2.1cm) and collapses > 50% with respiration consistent  with normal RA pressure (3mmHg).   M-Mode/2D Measurements (cm)   LV Diastolic Dimension: 3.83 cm LV Systolic Dimension: 3.8 cm  LV Septum Diastolic: 6.94 cm  LV PW Diastolic: 8.53 cm        AO Root Dimension: 3.2 cm                                  AV Cusp Separation: 1 cm                                  LA Dimension: 3.4 cm  LVOT: 2.2 cm                    LA Area: 17.7 cm2                                  LA volume/Index: 52.6 ml /26 ml/m2  Doppler Measurements   AV Peak Velocity: 390 cm/s     MV Peak E-Wave: 65.6 cm/s  AV Peak Gradient: 60.84 mmHg   MV Peak A-Wave: 84.4 cm/s  AV Mean Gradient: 23 mmHg      MV E/A Ratio: 0.78  LVOT Peak Velocity: 116 cm/s  AV Area (Continuity):0.7 cm2   TR Velocity:284 cm/s  TR Gradient:32.26 mmHg  Estimated RAP:3 mmHg  Estimated RVSP: 35 mmHg  E' Septal Velocity: 6.64 cm/s  E' Lateral Velocity: 8.38 cm/s  E/E' ratio: 8  PV Peak Velocity: 110 cm/s  PV Peak Gradient: 4.84 mmHg   Aortic Valve   Peak Velocity: 390 cm/s    Mean Velocity: 222 cm/s  Peak Gradient: 60.84 mmHg  Mean Gradient: 23 mmHg  Area (continuity): 0.7 cm2  AV VTI: 135 cm   Cusp Separation: 1 cm  Aorta   Aortic Root: 3.2 cm LVOT Diameter: 2.2 cm      CT HEAD WO CONTRAST    Result Date: 6/10/2022  EXAMINATION: CT OF THE HEAD WITHOUT CONTRAST  6/10/2022 8:19 am TECHNIQUE: CT of the head was performed without the administration of intravenous contrast. Automated exposure control, iterative reconstruction, and/or weight based adjustment of the mA/kV was utilized to reduce the radiation dose to as low as reasonably achievable. COMPARISON: 05/17/2022 CT HISTORY: ORDERING SYSTEM PROVIDED HISTORY: ams TECHNOLOGIST PROVIDED HISTORY: If patient is on cardiac monitor and/or pulse ox, they may be taken off cardiac monitor and pulse ox, left on O2 if currently on. All monitors reattached when patient returns to room. Has a \"code stroke\" or \"stroke alert\" been called? ->Yes Reason for exam:->ams Decision Support Exception - unselect if not a suspected or confirmed emergency medical condition->Emergency Medical Condition (MA) Reason for Exam: found down on knees, AMS, baseline unknown to ER, pt gives no history, came from nursing facility Additional signs and symptoms: diabetes, on hemodialysis FINDINGS: BRAIN/VENTRICLES: The ventricles and sulci are prominent. Pattern is consistent with age-related atrophy. No extra-axial fluid collections, and no sign of recent intracranial hemorrhage. Decreased attenuation is noted within the periventricular white matter. Pattern is consistent with chronic small vessel ischemic change. No acute edema or mass effect. No mass lesions are detected. ORBITS: The visualized portion of the orbits demonstrate no acute abnormality. SINUSES: The visualized paranasal sinuses and mastoid air cells demonstrate no acute abnormality. SOFT TISSUES/SKULL:  No acute abnormality of the visualized skull or soft tissues. No acute intracranial abnormality. The findings were sent to the Radiology Results [id number] at 8:43 am on 6/10/2022 to be communicated to a licensed caregiver.      CT HEAD WO CONTRAST    Result Date: 5/17/2022  EXAMINATION: CT OF THE HEAD WITHOUT CONTRAST  5/17/2022 4:46 pm TECHNIQUE: CT of the head was performed without the administration of intravenous contrast. Automated exposure control, iterative reconstruction, and/or weight based adjustment of the mA/kV was utilized to reduce the radiation dose to as low as reasonably achievable. COMPARISON: None. HISTORY: ORDERING SYSTEM PROVIDED HISTORY: generalized fatigue, frequent falls TECHNOLOGIST PROVIDED HISTORY: If patient is on cardiac monitor and/or pulse ox, they may be taken off cardiac monitor and pulse ox, left on O2 if currently on. All monitors reattached when patient returns to room. Has a \"code stroke\" or \"stroke alert\" been called? ->No Reason for exam:->generalized fatigue, frequent falls Decision Support Exception - unselect if not a suspected or confirmed emergency medical condition->Emergency Medical Condition (MA) Reason for Exam: Generalized fatigue, Frequent falls FINDINGS: BRAIN/VENTRICLES: There is no acute intracranial hemorrhage, mass effect or midline shift. No abnormal extra-axial fluid collection. The gray-white differentiation is maintained without evidence of an acute infarct. There is no evidence of hydrocephalus. Generalized prominence of the ventricular and cisternal spaces consistent with cerebral atrophy. Decreased attenuation in the periventricular and subcortical white matter most consistent with small vessel ischemic change. Intracranial atherosclerotic vascular calcification. ORBITS: The visualized portion of the orbits demonstrate no acute abnormality. SINUSES: The visualized paranasal sinuses and mastoid air cells demonstrate no acute abnormality. SOFT TISSUES/SKULL:  No acute abnormality of the visualized skull or soft tissues. No acute intracranial abnormality. Generalized cerebral atrophy and chronic small vessel white matter ischemic changes.  RECOMMENDATIONS: Unavailable     XR CHEST PORTABLE    Result Date: ------------------------------------------------------------------  Patient Status:Routine. Study [facility] - Vascular Lab. Technical Quality:Limited visualization due to patient immobility. Risk Factors   - The patient's risk factor(s) include: diabetes mellitus and treated     arterial hypertension. Plaque   - A plaque was found in the Right Prox ICA. The plaque characteristics are: high echogenicity and moderate severity. There is evidence of calcified plaque. - A plaque was found in the Left Prox ICA. The plaque characteristics are: medium echogenicity and severe. There is evidence of calcified plaque. There is evidence of intimal thickening. Velocities are measured in cm/s ; Diameters are measured in mm Carotid Right Measurements +---------------+----+----+-----+----+ ! Location       ! PSV ! EDV ! Angle! RI  ! +---------------+----+----+-----+----+ ! Prox CCA       !75.2!11. 8!60   !0.84! +---------------+----+----+-----+----+ ! Mid CCA        !60.3!8.7 ! 60   !0.86! +---------------+----+----+-----+----+ ! Dist CCA       !65.9!9.94!60   !0.85! +---------------+----+----+-----+----+ ! Prox ICA       !106 !14. 9!60   !0.86! +---------------+----+----+-----+----+ ! Mid ICA        !96. 5!17. 7! 60   !0.82! +---------------+----+----+-----+----+ ! Dist ICA       !80.2!12. 2!60   !0.85! +---------------+----+----+-----+----+ ! Prox ECA       !102 !0   !60   !1   ! +---------------+----+----+-----+----+ ! Vertebral      !45  !3.43!60   !0.92! +---------------+----+----+-----+----+ ! Prox Subclavian! 249 !    !60   !    ! +---------------+----+----+-----+----+   - There is antegrade vertebral flow noted on the right side. - Additional Measurements:ICAPSV/CCAPSV 1.76. ICAEDV/CCAEDV 1.5. Carotid Left Measurements +---------------+----+----+-----+----+ ! Location       ! PSV ! EDV ! Angle! RI  ! +---------------+----+----+-----+----+ ! Prox CCA       !167 !17. 8!60   !0.89! +---------------+----+----+-----+----+ ! Mid CCA ! 98. 8!11. 6!60   !0.88! +---------------+----+----+-----+----+ ! Dist CCA       !127 !20. 3! 60   !0.84! +---------------+----+----+-----+----+ ! Prox ICA       !326 !78  !60   !0.76! +---------------+----+----+-----+----+ ! Mid ICA        !250 !45. 1! 60   !0.82! +---------------+----+----+-----+----+ ! Dist ICA       !210 !34. 7!60   !0.83! +---------------+----+----+-----+----+ ! Prox ECA       !167 !    !61   !    ! +---------------+----+----+-----+----+ ! Vertebral      !56  !10. 5!60   !0.81! +---------------+----+----+-----+----+ ! Prox Subclavian! 220 !    !60   !    ! +---------------+----+----+-----+----+   - There is antegrade vertebral flow noted on the left side. - Additional Measurements:ICAPSV/CCAPSV 3. 3. ICAEDV/CCAEDV 4.38.    CTA HEAD NECK W CONTRAST    Result Date: 6/10/2022  EXAMINATION: CTA OF THE HEAD AND NECK WITH CONTRAST 6/10/2022 8:21 am: TECHNIQUE: CTA of the head and neck was performed with the administration of intravenous contrast. Multiplanar reformatted images are provided for review. MIP images are provided for review. Stenosis of the internal carotid arteries measured using NASCET criteria. Automated exposure control, iterative reconstruction, and/or weight based adjustment of the mA/kV was utilized to reduce the radiation dose to as low as reasonably achievable. 3D reconstructed images were performed on a separate workstation and provided for review. This scan was analyzed using Viz. ai contact LVO. Identification of suspected findings is not for diagnostic use beyond notification. Viz LVO is limited to analysis of imaging data and should not be used in-lieu of full patient evaluation or relied upon to make or confirm diagnosis. COMPARISON: None. HISTORY: ORDERING SYSTEM PROVIDED HISTORY: ams TECHNOLOGIST PROVIDED HISTORY: Reason for exam:->ams Has a \"code stroke\" or \"stroke alert\" been called? ->Yes Decision Support Exception - unselect if not a suspected or confirmed emergency medical condition->Emergency Medical Condition (MA) Reason for Exam: found down on knees, AMS, baseline unknown to ER, pt gives no history, came from nursing facility Additional signs and symptoms: no mention of prev stroke in chart, diabetic, on hemodialysis FINDINGS: CTA NECK: AORTIC ARCH/ARCH VESSELS: No dissection or arterial injury. No significant stenosis of the brachiocephalic or subclavian arteries. CAROTID ARTERIES: Severe (greater than 70%) stenosis of the proximal left internal carotid artery. Mild (less than 50%) stenosis of the proximal right internal carotid artery. VERTEBRAL ARTERIES: No dissection, arterial injury, or significant stenosis. The left vertebral artery appears dominant. SOFT TISSUES: The lung apices are clear. No cervical or superior mediastinal lymphadenopathy. The larynx and pharynx are unremarkable. No acute abnormality of the salivary and thyroid glands. BONES: No acute osseous abnormality. Multilevel cervical spondylosis with associated severe neural foraminal narrowing as well as spinal canal stenosis at the C5-C6 level. CTA HEAD: ANTERIOR CIRCULATION: No significant stenosis of the intracranial internal carotid, anterior cerebral, or middle cerebral arteries. No aneurysm. POSTERIOR CIRCULATION: The right vertebral artery V4 segment after the PICA takeoff is not well visualized and may be occluded or congenitally diminutive. Mild/moderate stenosis of the proximal left V4 segment. The basilar artery appears diminutive, may in part be congenital given presence of bilateral posterior communicating arteries. Otherwise, no significant stenosis of the vertebral, basilar, or posterior cerebral arteries. No aneurysm. OTHER: No dural venous sinus thrombosis on this non-dedicated study. Scattered periapical lucency such as around the left lower central incisor and right lower 1st molar. BRAIN: No mass effect or midline shift.      1.  Severe (greater than 70%) stenosis of the proximal left abnormality. SINUSES: The visualized paranasal sinuses and mastoid air cells are well aerated. BONES/SOFT TISSUES: The bone marrow signal intensity appears normal. The soft tissues demonstrate no acute abnormality. 1. Small acute to subacute cortical infarct involving the right brachium pontis/cerebellar hemisphere. 2. No significant mass effect or acute hemorrhage. 3. Disproportionate enlargement of the ventricles relative to the degree of parenchymal volume loss, which may reflect more central white matter volume loss versus NPH in the appropriate clinical setting. 4. Chronic small vessel ischemic changes and remote lacunar infarcts throughout the sadia. NM Cardiac Stress Test Nuclear Imaging    Result Date: 5/20/2022  Cardiac Perfusion Imaging  Demographics   Patient Name       [facility]   Date of Study      05/20/2022         Gender              Male   Patient Number     [id number]         Date of Birth       1950   Visit Number       [id number]          Age                 66 year(s)   Accession Number   [id number]         Room Number         5892   Corporate ID       [id number]           NM Technician       Cecile Jacinto,                                                            [facility]   Nurse              Mercy Cabral,  Interpreting        [address] [place] [address].                     RN                 Physician           Leisa Ornelas MD   Ordering Physician Jennifer Gracia MD   The procedure was explained in detail to the patient. Risks,  complications and alternative treatments were reviewed. Written consent  was obtained. Procedure Procedure Type:   Nuclear Stress Test:Pharmacological, NM MYOCARDIAL SPECT REST EXERCISE OR  RX   Study location: [address] [place] [address] - Nuclear Medicine   Indications: Chest pain. Hospital Status: Inpatient.   Height: 70 inches Weight: 190 pounds  Risk Factors   The patient risk factors include:treated hypercholesterolemia, treated  hypertension and diabetes mellitus. Conclusions   Summary  There is normal isotope uptake at stress and rest. There is no evidence of  myocardial ischemia or scar. Normal myocardial thickening and wall motion. Borderline cardiac size by volumes. Normal LV function with Post-stress LVEF  of 57%. Overall findings represent a low risk scan. Stress Protocols   Resting ECG  Normal sinus, first degree AV block,  normal axis, RBBB, T wave inversions  inferior and anterolateral leads,  consider ischemia. Resting HR:73 bpm  Resting BP:109/47 mmHg  Stress Protocol:Pharmacologic - Lexiscan's  Peak HR:85 bpm                   HR/BP product:9180  Peak BP:108/43 mmHg  Predicted HR: 148 bpm  % of predicted HR: 57  Test duration: 4 min  Reason for termination:Completed   ECG Findings  SInus with intermittent RBBB noted. Arrhythmias  None. Symptoms  Patient developed shortness of breath and  dizziness, likely related to lexiscan. Symptoms  resolved with rest.   Complications  Procedure complication was none. Stress Interpretation  There is normal isotope uptake at stress and rest.  There is no evidence of myocardial ischemia or  scar. Normal myocardial thickening and wall  motion. Borderline cardiac size by volumes. Normal  LV function with Post-stress LVEF of 57%. TID  1.07. Procedure Medications   - Lexiscan I.V. 0.4 mg.  Imaging Protocols   - One Day   Rest                          Stress   Isotope:Myoview/Tetrofosmin   Isotope: Myoview/Tetrofosmin  Isotope dose:11.5 mCi         Isotope dose:32.6 mCi  Administration Route:I.V.      Administration Route:I.V.  Date:05/20/2022 07:20         Date:05/20/2022 08:30                                 Technique:      Gated  Imaging Results   Applied corrections   - Attenuation correction  applied     Stress ejection    Ejection fraction:56 %    EDV :151 ml    ESV :67 ml    Stroke volume :84 ml    LV mass :173 gr  Medical History   Additional Medical History   HD Patient   Signatures   ------------------------------------------------------------------  Electronically signed by Bakari Day MD (Interpreting  physician) on 05/20/2022 at 10:25  ------------------------------------------------------------------        ASSESSMENT:    Principal Problem:    Acute CVA (cerebrovascular accident) [facility])  Active Problems:    Elevated troponin    ESRD (end stage renal disease) ([facility]    Left carotid stenosis    Severe aortic stenosis    Type 2 diabetes mellitus with renal complication ([facility]    Essential hypertension    Hypothyroidism    NSTEMI (non-ST elevated myocardial infarction) [facility]  Resolved Problems:    * No resolved hospital problems. *      PLAN:    Possible recurrent Acute CVA: Right sided weakness is new compared to prior CVA where he had right/posterior CVA. CTA noted. Concern that left ICA may be contributing. Repeat Brain MRI. Neuro evaluation. PT/OT/SLP. Tele. ECHO done recently. Elevated troponin: Similar to prior, elevation is likely demand ischemia in setting of ESRD, severe AS. CVA could be contributing. No CP/SOB. Recent work-up including Stess and ECHO. Plan was for outpatient TAVR eval. Monitor. ESRD (end stage renal disease): Nephrology evaluation. Severe aortic stenosis: Outpatient TAVR eval.     Diabetes Mellitus, Type 2: Controlled without meds. Monitor. Hypertension, benign: Controlled. Continue current medication regimen, monitor and adjust as needed. Hypothyroidism: Stable. Supplement.      DVT Prophylaxis: Heparin SQ  Diet: Diet NPO  Code Status: Full Code  PT/OT Eval Status: Pending  Dispo - > 2 days    Jose Danielson MD

## 2022-06-10 NOTE — PROGRESS NOTES
Physical Therapy/Occupational Therapy  Orders received and chart reviewed. Attempted to see pt at this time for PT/OT evaluation. Per RN pt about to go off floor for MRI. Will re-attempt as schedule permits. Thanks.   Kamron Delvalle PT, DPT  Wing Betancourt OTR/L

## 2022-06-10 NOTE — CARE COORDINATION
CASE MANAGEMENT INITIAL ASSESSMENT      Reviewed chart and completed assessment with patient who is sleeping and wife at bedside  Family present: wife, Tania Winters  Explained Case Management role/services. yes    Primary contact information:wife, [facility] Decision Maker :   Primary Decision Maker: Elizabeth Esparza Spouse - [phone]    Secondary Decision Maker: Eloise Sheets Child - [phone]          Can this person be reached and be able to respond quickly, such as within a few minutes or hours? Yes      Admit date/status:6/10/22  Diagnosis: AMS, r/o CVA   Is this a Readmission?:  Yes      Insurance:[employer] required for SNF: Yes       3 night stay required: No    Living arrangements, Adls, care needs, prior to admission:usually lives at home with wife, has been at [facility] since last hospitalization    Durable Medical Equipment at home:  Walker_x_Cane__RTS__ BSC__Shower Chair__  02__ HHN__ CPAP__  BiPap__  Hospital Bed__ W/C___ Other_____    Services in the home and/or outpatient, prior to admission:provided by SNF    Current PCP:Dr. Junie Grissom                                Medications:yes Prescription coverage? Yes Will pt require financial assistance with medications No     [facility]     Dialysis Facility (if applicable)   · [facility]  · [address]  · Dialysis Schedule:T, Th, S 1030  · Phone:  · Fax:    PT/OT recs:    Hospital Exemption Notification (HEN):    Barriers to discharge:medical complications    Plan/comments:Referred to patient for d/c planning. Spoke to wife at bedside. Patient is a 66year old male admitted for AMS. Patient usually lives at home with wife. Patient is currently at [facility] SNF. Facility updated. Please note wife going for TAVR next week at [facility]. Case management to follow for d/c needs.  Electronically signed by KRYSTIN Diaz LISW-S on 6/10/2022 at 9:46 AM      ECOC on chart for MD signature

## 2022-06-11 PROBLEM — E44.0 MODERATE MALNUTRITION (HCC): Status: ACTIVE | Noted: 2022-06-11

## 2022-06-11 LAB
ABO/RH: NORMAL
ALBUMIN SERPL-MCNC: 2.3 G/DL (ref 3.4–5)
ANION GAP SERPL CALCULATED.3IONS-SCNC: 13 MMOL/L (ref 3–16)
ANTIBODY SCREEN: NORMAL
BASOPHILS ABSOLUTE: 0.1 K/UL (ref 0–0.2)
BASOPHILS RELATIVE PERCENT: 0.8 %
BLOOD BANK DISPENSE STATUS: NORMAL
BLOOD BANK PRODUCT CODE: NORMAL
BPU ID: NORMAL
BUN BLDV-MCNC: 37 MG/DL (ref 7–20)
CALCIUM SERPL-MCNC: 9.1 MG/DL (ref 8.3–10.6)
CHLORIDE BLD-SCNC: 97 MMOL/L (ref 99–110)
CO2: 25 MMOL/L (ref 21–32)
CREAT SERPL-MCNC: 5.4 MG/DL (ref 0.8–1.3)
DESCRIPTION BLOOD BANK: NORMAL
EOSINOPHILS ABSOLUTE: 0.4 K/UL (ref 0–0.6)
EOSINOPHILS RELATIVE PERCENT: 3.1 %
GFR AFRICAN AMERICAN: 13
GFR NON-AFRICAN AMERICAN: 10
GLUCOSE BLD-MCNC: 113 MG/DL (ref 70–99)
HBV SURFACE AB TITR SER: <3.5 MIU/ML
HCT VFR BLD CALC: 20.9 % (ref 40.5–52.5)
HCT VFR BLD CALC: 24 % (ref 40.5–52.5)
HEMOGLOBIN: 6.6 G/DL (ref 13.5–17.5)
HEMOGLOBIN: 7.7 G/DL (ref 13.5–17.5)
HEPATITIS B SURFACE ANTIGEN INTERPRETATION: NORMAL
LYMPHOCYTES ABSOLUTE: 0.8 K/UL (ref 1–5.1)
LYMPHOCYTES RELATIVE PERCENT: 6.7 %
MCH RBC QN AUTO: 27 PG (ref 26–34)
MCHC RBC AUTO-ENTMCNC: 31.7 G/DL (ref 31–36)
MCV RBC AUTO: 85.2 FL (ref 80–100)
MONOCYTES ABSOLUTE: 0.9 K/UL (ref 0–1.3)
MONOCYTES RELATIVE PERCENT: 7.4 %
NEUTROPHILS ABSOLUTE: 10.1 K/UL (ref 1.7–7.7)
NEUTROPHILS RELATIVE PERCENT: 82 %
PDW BLD-RTO: 18 % (ref 12.4–15.4)
PHOSPHORUS: 3.6 MG/DL (ref 2.5–4.9)
PLATELET # BLD: 484 K/UL (ref 135–450)
PMV BLD AUTO: 6.9 FL (ref 5–10.5)
POTASSIUM SERPL-SCNC: 3.6 MMOL/L (ref 3.5–5.1)
RBC # BLD: 2.45 M/UL (ref 4.2–5.9)
SODIUM BLD-SCNC: 135 MMOL/L (ref 136–145)
WBC # BLD: 12.3 K/UL (ref 4–11)

## 2022-06-11 PROCEDURE — 85018 HEMOGLOBIN: CPT

## 2022-06-11 PROCEDURE — 36415 COLL VENOUS BLD VENIPUNCTURE: CPT

## 2022-06-11 PROCEDURE — 6370000000 HC RX 637 (ALT 250 FOR IP): Performed by: INTERNAL MEDICINE

## 2022-06-11 PROCEDURE — 6360000002 HC RX W HCPCS: Performed by: INTERNAL MEDICINE

## 2022-06-11 PROCEDURE — 36430 TRANSFUSION BLD/BLD COMPNT: CPT

## 2022-06-11 PROCEDURE — 5A1D70Z PERFORMANCE OF URINARY FILTRATION, INTERMITTENT, LESS THAN 6 HOURS PER DAY: ICD-10-PCS | Performed by: INTERNAL MEDICINE

## 2022-06-11 PROCEDURE — 86706 HEP B SURFACE ANTIBODY: CPT

## 2022-06-11 PROCEDURE — 86704 HEP B CORE ANTIBODY TOTAL: CPT

## 2022-06-11 PROCEDURE — 86900 BLOOD TYPING SEROLOGIC ABO: CPT

## 2022-06-11 PROCEDURE — 85025 COMPLETE CBC W/AUTO DIFF WBC: CPT

## 2022-06-11 PROCEDURE — 87340 HEPATITIS B SURFACE AG IA: CPT

## 2022-06-11 PROCEDURE — 86901 BLOOD TYPING SEROLOGIC RH(D): CPT

## 2022-06-11 PROCEDURE — 80069 RENAL FUNCTION PANEL: CPT

## 2022-06-11 PROCEDURE — 86923 COMPATIBILITY TEST ELECTRIC: CPT

## 2022-06-11 PROCEDURE — P9016 RBC LEUKOCYTES REDUCED: HCPCS

## 2022-06-11 PROCEDURE — 99223 1ST HOSP IP/OBS HIGH 75: CPT | Performed by: NURSE PRACTITIONER

## 2022-06-11 PROCEDURE — 85014 HEMATOCRIT: CPT

## 2022-06-11 PROCEDURE — 86850 RBC ANTIBODY SCREEN: CPT

## 2022-06-11 PROCEDURE — 1200000000 HC SEMI PRIVATE

## 2022-06-11 PROCEDURE — 90935 HEMODIALYSIS ONE EVALUATION: CPT

## 2022-06-11 RX ORDER — CHOLECALCIFEROL (VITAMIN D3) 10 MCG
1 TABLET ORAL DAILY
Status: DISCONTINUED | OUTPATIENT
Start: 2022-06-11 | End: 2022-06-17

## 2022-06-11 RX ORDER — SODIUM CHLORIDE 9 MG/ML
INJECTION, SOLUTION INTRAVENOUS PRN
Status: DISCONTINUED | OUTPATIENT
Start: 2022-06-11 | End: 2022-06-14

## 2022-06-11 RX ADMIN — CLOPIDOGREL BISULFATE 75 MG: 75 TABLET ORAL at 12:29

## 2022-06-11 RX ADMIN — SEVELAMER CARBONATE 800 MG: 800 TABLET, FILM COATED ORAL at 17:21

## 2022-06-11 RX ADMIN — DOXAZOSIN 1 MG: 2 TABLET ORAL at 12:30

## 2022-06-11 RX ADMIN — NEPHROCAP 1 MG: 1 CAP ORAL at 18:32

## 2022-06-11 RX ADMIN — ASPIRIN 81 MG: 81 TABLET, COATED ORAL at 12:30

## 2022-06-11 RX ADMIN — SEVELAMER CARBONATE 800 MG: 800 TABLET, FILM COATED ORAL at 12:30

## 2022-06-11 RX ADMIN — LEVOTHYROXINE SODIUM 137 MCG: 0.03 TABLET ORAL at 06:23

## 2022-06-11 RX ADMIN — EPOETIN ALFA-EPBX 10000 UNITS: 10000 INJECTION, SOLUTION INTRAVENOUS; SUBCUTANEOUS at 18:31

## 2022-06-11 RX ADMIN — CARVEDILOL 3.12 MG: 3.12 TABLET, FILM COATED ORAL at 12:30

## 2022-06-11 RX ADMIN — ATORVASTATIN CALCIUM 20 MG: 10 TABLET, FILM COATED ORAL at 12:30

## 2022-06-11 RX ADMIN — HEPARIN SODIUM 5000 UNITS: 5000 INJECTION INTRAVENOUS; SUBCUTANEOUS at 06:23

## 2022-06-11 RX ADMIN — CALCIUM 500 MG: 500 TABLET ORAL at 12:29

## 2022-06-11 ASSESSMENT — PAIN SCALES - GENERAL
PAINLEVEL_OUTOF10: 0

## 2022-06-11 NOTE — PROGRESS NOTES
Comprehensive Nutrition Assessment    Type and Reason for Visit:  Positive Nutrition Screen    Nutrition Recommendations/Plan:   1. Recommend General diet order, free of therapeutic restrictions, to promote adequate nutrient intake  2. Add Nepro shakes BID  3. Encourage PO intakes, assist with meals  4. Obtain updated weight  5. Monitor nutrition adequacy, pertinent labs, bowel habits, wt changes, and clinical progress     Malnutrition Assessment:  Malnutrition Status: Moderate malnutrition (06/11/22 1347)    Context:  Acute Illness     Findings of the 6 clinical characteristics of malnutrition:  Body Fat Loss:  Mild body fat loss Orbital,Triceps   Muscle Mass Loss:  Mild muscle mass loss Temples (temporalis),Clavicles (pectoralis & deltoids)    Nutrition Assessment:    Positive nutrition screen r/t weight loss, poor intakes. Pt admitted with R sided seakness, possible recurrent acute CVA. PMH T2DM, ESRD on HD, HTN. Currently on regular, low phos, low protein diet. Pt is a poor historian and no family at bedside during visit. PO intakes of 0-25% per EMR. CBW is 169#, unknown wt source. Noted wt around 185-190# x1mo ago. Will order updated weight. Recommend to liberalize to general diet and add Nepro shakes BID to promote adequate nutrition. Will [facility] to [facility]. Nutrition Related Findings:    Na 135. Active BS. Wound Type: None       Current Nutrition Intake & Therapies:    Average Meal Intake: 1-25%,0%  Average Supplements Intake: None Ordered  ADULT DIET; Regular; Low Phosphorus (Less than 1000 mg); Less than 60 gm    Anthropometric Measures:  Height: 5' 10\" (177.8 cm)  Ideal Body Weight (IBW): 166 lbs (75 kg)       Current Body Weight: 169 lb (76.7 kg), 101.8 % IBW.  Weight Source: Not Specified  Current BMI (kg/m2): 24.2  Usual Body Weight: 191 lb (86.6 kg) (5/19/22 bed scale)  % Weight Change (Calculated): -11.5                    BMI Categories: Normal Weight (BMI 22.0 to 24.9) age over 72    Estimated Daily Nutrient Needs:  Energy Requirements Based On: Kcal/kg (25-30)  Weight Used for Energy Requirements: Current  Energy (kcal/day): 8939-3802  Weight Used for Protein Requirements: Current (1-1.2)  Protein (g/day): 77-92  Method Used for Fluid Requirements: 1 ml/kcal  Fluid (ml/day): 0417-3222    Nutrition Diagnosis:   · Moderate malnutrition related to inadequate protein-energy intake as evidenced by intake 0-25%,Criteria as identified in malnutrition assessment,weight loss    Nutrition Interventions:   Food and/or Nutrient Delivery: Modify Current Diet,Start Oral Nutrition Supplement  Nutrition Education/Counseling: No recommendation at this time  Coordination of Nutrition Care: Continue to monitor while inpatient       Goals:     Goals: PO intake 50% or greater,prior to discharge       Nutrition Monitoring and Evaluation:   Behavioral-Environmental Outcomes: None Identified  Food/Nutrient Intake Outcomes: Food and Nutrient Intake,Supplement Intake  Physical Signs/Symptoms Outcomes: Weight,Nutrition Focused Physical Findings,Biochemical Data    Discharge Planning:    Continue current [facility]  Contact: [phone]

## 2022-06-11 NOTE — CONSULTS
[facility] Consult Note           Reason for Consult:  End stage kidney disease  Requesting Physician:  Dr. Otis Méndez    Chief Complaint:  Weakness  History Obtained From:  patient, electronic medical record    History of Present Ilness:    66year old female with ESKD on HD Tues-Thurs-Sat brought in with acute right sided weakness. We have been asked to assist in further mgmt of his dialysis care. Brought in for acute right sided weakness, kneeling on ground. Received HD today with 1.5 liters removed, post-weight of 76.7 kg? .  Given 1 unit prbc with HD today. Continue to have failure to thrive since being started on dialysis with reduced intake, weight loss. Denies any chest pain, shortness of breath, abdominal pain. Past Medical History:        Diagnosis Date    Anemia     Diabetes mellitus ([facility]     ESRD (end stage renal disease) on dialysis ([facility] [address]     Tues-Thurs-Sat    Hyperlipidemia     Hypertension        Past Surgical History:    History reviewed. No pertinent surgical history. Home Medications:    No current facility-administered medications on file prior to encounter. Current Outpatient Medications on File Prior to Encounter   Medication Sig Dispense Refill    epoetin katelynn (EPOGEN;PROCRIT) 4000 UNIT/ML injection Inject 8,000 Units into the skin three times a week T, Th, S with dialysis.       doxazosin (CARDURA) 1 MG tablet Take 1 tablet by mouth daily 30 tablet 0    clopidogrel (PLAVIX) 75 MG tablet Take 1 tablet by mouth daily 30 tablet 1    megestrol (MEGACE) 40 MG/ML suspension Take 15.6 mLs by mouth daily 240 mL 1    sevelamer (RENVELA) 800 MG tablet Take 1 tablet by mouth 3 times daily (with meals)      carvedilol (COREG) 3.125 MG tablet Take 1 tablet by mouth 2 times daily (with meals) 60 tablet 3    atorvastatin (LIPITOR) 20 MG tablet Take 1 tablet by mouth daily 30 tablet 3    nitroGLYCERIN (NITROSTAT) 0.4 MG SL tablet up to max of 3 total doses. If no relief after 1 dose, call 911. (Patient not taking: Reported on 6/10/2022) 25 tablet 3    calcium elemental (OSCAL) 500 MG TABS tablet Take 1 tablet by mouth daily 30 tablet 3    levothyroxine (SYNTHROID) 125 MCG tablet Take 137 mcg by mouth Daily          Allergies:  Patient has no known allergies. Social History:    Social History     Socioeconomic History    Marital status:      Spouse name: Not on file    Number of children: Not on file    Years of education: Not on file    Highest education level: Not on file   Occupational History    Not on file   Tobacco Use    Smoking status: Former Smoker     Packs/day: 0.50     Types: Cigarettes     Quit date: 2018     Years since quittin.2    Smokeless tobacco: Never Used   Vaping Use    Vaping Use: Never used   Substance and Sexual Activity    Alcohol use: No     Alcohol/week: 35.0 standard drinks     Types: 35 Cans of beer per week     Comment: last drink  \"Pt states he hasn't drank in four weeks\"    Drug use: No    Sexual activity: Not Currently   Other Topics Concern    Not on file   Social History Narrative    Not on file     Social Determinants of Health     Financial Resource Strain:     Difficulty of Paying Living Expenses: Not on file   Food Insecurity:     Worried About Running Out of Food in the Last Year: Not on file    Margarita of Food in the Last Year: Not on file   Transportation Needs:     Lack of Transportation (Medical): Not on file    Lack of Transportation (Non-Medical):  Not on file   Physical Activity:     Days of Exercise per Week: Not on file    Minutes of Exercise per Session: Not on file   Stress:     Feeling of Stress : Not on file   Social Connections:     Frequency of Communication with Friends and Family: Not on file    Frequency of Social Gatherings with Friends and Family: Not on file    Attends Muslim Services: Not on file   [employer] of Clubs or Organizations: Not on file    Attends Club or Organization Meetings: Not on file    Marital Status: Not on file   Intimate Partner Violence:     Fear of Current or Ex-Partner: Not on file    Emotionally Abused: Not on file    Physically Abused: Not on file    Sexually Abused: Not on file   Housing Stability:     Unable to Pay for Housing in the Last Year: Not on file    Number of [place] in the Last Year: Not on file    Unstable Housing in the Last Year: Not on file       Family History:   Family History   Family history unknown: Yes       Review of Systems:   Pertinent positives stated above in HPI. Remainder of 10 point review of systems were reviewed and were negative.     Physical exam:   Constitutional:  VITALS:  /62   Pulse 84   Temp 98.4 °F (36.9 °C) (Axillary)   Resp 18   Ht 5' 10\" (1.778 m)   Wt 169 lb 1.5 oz (76.7 kg)   SpO2 95%   BMI 24.26 kg/m²   Gen: alert, awake, ill-appearing  Skin: no rash, turgor wnl  Heent:  eomi, mmm  Neck: no bruits or jvd noted, thyroid normal  Cardiovascular:  S1, S2 with +amanda, no r/g  Respiratory: CTA B without w/r/r; respiratory effort normal  Abdomen:  +bs, soft, nt, nd, no hepatosplenomegaly  Ext: no lower extremity edema  Access: [facility]  Psychiatric: mood and affect appropriate; judgement and insight intact  Musculoskeletal:  Rom, muscular strength limited; digits, nails normal    Data/  CBC:   Lab Results   Component Value Date    WBC 12.3 06/11/2022    RBC 2.45 06/11/2022    HGB 7.7 06/11/2022    HCT 24.0 06/11/2022    MCV 85.2 06/11/2022    MCH 27.0 06/11/2022    MCHC 31.7 06/11/2022    RDW 18.0 06/11/2022     06/11/2022    MPV 6.9 06/11/2022     BMP:    Lab Results   Component Value Date     06/11/2022    K 3.6 06/11/2022    K 3.5 06/10/2022    CL 97 06/11/2022    CO2 25 06/11/2022    BUN 37 06/11/2022    LABALBU 2.3 06/11/2022    CREATININE 5.4 06/11/2022    CALCIUM 9.1 06/11/2022    GFRAA 13 06/11/2022    LABGLOM 10 06/11/2022 GLUCOSE 113 06/11/2022         Assessment/    - End stage kidney disease - on HD Tues-Thurs-Sat    - Suspicion for embolic source to recurrent CVA's   Punctate areas noted in the left periventricular white matter, right parietal lobe,    right occipital lobe, and left occipital lobe. Previous right cerebellar infarct. - Left carotid artery stenosis    - Aortic stenosis - moderate to severe    - Hypertension    - DM2    - Anemia - VIRAJ with HD, prn prbc's    - Hypothyroidism      Plan/    - HD completed today, next again on Tuesday, under listed outpatient EDW of 83.5 kg, suspect bed weight inaccuracy  - VIRAJ with HD, prn prbc's  - Trend labs, bp's      Thank you for the consultation. Please do not hesitate to call with questions. ____________________________________  Ladoris MD Oswaldo  [facility]  [email or web address]  Office: [phone]

## 2022-06-11 NOTE — CONSULTS
Medications   Medication Dose Route Frequency Provider Last Rate Last Admin    0.9 % sodium chloride infusion   IntraVENous PRN Kusum Rees MD        atorvastatin (LIPITOR) tablet 20 mg  20 mg Oral Daily Lorenda Begun, MD   20 mg at 06/11/22 1230    calcium elemental (OSCAL) tablet 500 mg  500 mg Oral Daily Lorenda Begun, MD   500 mg at 06/11/22 1229    carvedilol (COREG) tablet 3.125 mg  3.125 mg Oral BID WC Lorenda Begun, MD   3.125 mg at 06/11/22 1230    [Held by provider] clopidogrel (PLAVIX) tablet 75 mg  75 mg Oral Daily Lorenda Begun, MD   75 mg at 06/11/22 1229    doxazosin (CARDURA) tablet 1 mg  1 mg Oral Daily Lorenda Begun, MD   1 mg at 06/11/22 1230    levothyroxine (SYNTHROID) tablet 137 mcg  137 mcg Oral Daily Lorenda Begun, MD   137 mcg at 06/11/22 0623    sevelamer (RENVELA) tablet 800 mg  800 mg Oral TID ZULMA Lorestefani Begun, MD   800 mg at 06/11/22 1230    ondansetron (ZOFRAN-ODT) disintegrating tablet 4 mg  4 mg Oral Q8H PRN Tylor William MD        Or    ondansetron [facility]) injection 4 mg  4 mg IntraVENous Q6H PRN Tylor William MD        polyethylene glycol [facility]) packet 17 g  17 g Oral Daily PRN Tylor William MD        [facility]] aspirin EC tablet 81 mg  81 mg Oral Daily Tylor William MD   81 mg at 06/11/22 1230    Or    [Held by provider] aspirin suppository 300 mg  300 mg Rectal Daily Tylor William MD           ROS: 10-14 system review, reviewed with patient's family and/or nurse was unremarkable except mentioned in HPI. Constitutional:   Vitals:    06/11/22 1036 06/11/22 1147 06/11/22 1224 06/11/22 1343   BP: (!) 149/77 (!) 141/72 120/71    Pulse: 87 92 88    Resp: 20 18 20    Temp: 98.2 °F (36.8 °C) 98.5 °F (36.9 °C) 98.1 °F (36.7 °C)    TempSrc: Oral  Oral    SpO2:   98%    Weight:  169 lb 1.5 oz (76.7 kg)     Height:    5' 10\" (1.778 m)     General appearance: Confused, appears fatigued.     Eye: Examination of conjunctiva and lids: No eye lid drooping, no redness or abnormal conjunctival coloration. Examination of pupil and irises: Pupil round, regular and reactive bilaterally direct and consensual. Iris is symmetric. Neck: Neck is supple. No thyromegaly  Cardiovascular: No lower leg edema with good pulsation. No carotid bruit  Neurological: Cranial nerves: Pupil round regular and reactive, extraocular muscle intact, no gaze preference, face is symmetric, uvula midline, tongue is midline. DTRs: Symmetric 2+ throughout arms and legs  Sensation: No sensory deficit or abnormal sensation  Plantars: Flexor bilaterally. Musculoskeletal:  Poor effort on exam.  LUE 3/5. Psychiatric: Poor judgment and insight. Poor orientation, waxing and waning. Easily distracted. Labile affect. Respiratory: Respiratory effort: Normal.  Palpation: No abnormal deformities. Skin: Inspection of the skin: Normal , no abnormal lesion. Palpation: No palpable nodules  ENT: No abnormalities with nasal mucosa. No abnormalities with oropharynx and palate is symmetric    Data:  LABS:   Lab Results   Component Value Date     06/11/2022    K 3.6 06/11/2022    K 3.5 06/10/2022    CL 97 06/11/2022    CO2 25 06/11/2022    BUN 37 06/11/2022    CREATININE 5.4 06/11/2022    GFRAA 13 06/11/2022    LABGLOM 10 06/11/2022    GLUCOSE 113 06/11/2022    PHOS 3.6 06/11/2022    MG 1.70 06/10/2022    CALCIUM 9.1 06/11/2022     Lab Results   Component Value Date    WBC 12.3 06/11/2022    RBC 2.45 06/11/2022    HGB 7.7 06/11/2022    HCT 24.0 06/11/2022    MCV 85.2 06/11/2022    RDW 18.0 06/11/2022     06/11/2022     Lab Results   Component Value Date    INR 1.21 (H) 04/04/2018    PROTIME 13.7 (H) 04/04/2018       Neuroimaging were independently reviewed by me.    Reviewed notes from different physicians  Reviewed lab and blood testing    Impression:    Acute, possibly embolic, bilateral CVAs - punctate areas noted in the left periventricular white matter, right parietal lobe, right occipital lobe, and left occipital lobe. Previous right cerebellar infarct noted. Left carotid stenosis, 70%  ESRD on HD  Anemia  Hypertension  Hyperlipidemia  Diabetes type 2, controlled. A1c 5.9. Aortic stenosis  Hypothyroidism    Recommendation:     Monitor on tele. Resume ASA and Plavix as soon as possible. Continue home BP meds. SSI coverage while inpatient. PT/OT/SLP  HD per nephrology. Consider vascular consult for left ICA stenosis, although given bilateral strokes, suspicious for embolic source such as A. Fib. Needs 30 day event monitor to rule out PAF. Recommend goals of care discussion with family. Thank you for referring such patient. If you have any questions regarding my consult note, please don't hesitate to call me. Roseline Walsh, LIZA    Attending Supervising Physician's Attestation Statement   I reviewed and agree with the findings and plan as documented in NP consult note  Patient admitted for acute right-sided weakness and possible stroke. Not a good historian. Agree with stroke work-up  PT, OT and speech  Telemetry  Aspirin  Blood pressure control  Poor prognosis    Electronically signed by Edu Feliciano MD on 6/13/22 at 3:52 PM EDT      This dictation was generated by voice recognition computer software.  Although all attempts are made to edit the dictation for accuracy, there may be errors in the  transcription that are not intended

## 2022-06-11 NOTE — PROGRESS NOTES
Occupational Therapy  Attempted OT evaluation this date, pt currently currently at dialysis. Will re-attempt as schedule allows.      Arun Damian OTR/L

## 2022-06-11 NOTE — PROGRESS NOTES
Physical Therapy  Order received, chart reviewed, attempted PT eval, pt is dialysis today, will follow up as pt condition and schedule allow   No charge  Thank you  Ivy Canales, PT

## 2022-06-11 NOTE — FLOWSHEET NOTE
Treatment time: 3 hours  Net UF: 1500 ml     Pre weight: 78.2 kg   Post weight: 76.7 kg  EDW: TBD kg     Access used: RIJ TDC  Access function: Good with  ml/min     Medications or blood products given: 1 unit PRBC     Regular outpatient schedule: TTS     Summary of response to treatment:      06/11/22 1147   Vital Signs   BP (!) 141/72   Temp 98.5 °F (36.9 °C)   Heart Rate 92   Resp 18   Weight 169 lb 1.5 oz (76.7 kg)   Percent Weight Change -1.92   Pain Assessment   Pain Assessment None - Denies Pain   Post-Hemodialysis Assessment   Post-Treatment Procedures Blood returned;Catheter capped, clamped and heparinized x 2 ports   Machine Disinfection Process Acid/Vinegar Clean;Heat Disinfect; Exterior Machine Disinfection   Rinseback Volume (ml) 400 ml   Total Liters Processed (l/min) 58.4 l/min   Dialyzer Clearance Moderately streaked   Duration of Treatment (minutes) 180 minutes   Heparin amount administered during treatment (units) 0 units   Hemodialysis Intake (ml) 800 ml   Hemodialysis Output (ml) 2300 ml   NET Removed (ml) 1500   Tolerated Treatment Good   Copy of dialysis treatment record placed in chart, to be scanned into EMR.  Report called to Chan Cowart RN

## 2022-06-11 NOTE — PROGRESS NOTES
Peripheral Pulses: +2 palpable, equal bilaterally       Labs:   Recent Labs     06/10/22  0835 06/11/22  0647   WBC 16.0* 12.3*   HGB 7.1* 6.6*   HCT 22.2* 20.9*   * 484*     Recent Labs     06/10/22  0835 06/11/22  0647   * 135*   K 3.5 3.6   CL 97* 97*   CO2 27 25   BUN 29* 37*   CREATININE 4.2* 5.4*   CALCIUM 8.7 9.1   PHOS  --  3.6     Recent Labs     06/10/22  0835   AST 27   ALT 13   BILITOT 0.6   ALKPHOS 611*     No results for input(s): INR in the last 72 hours. Recent Labs     06/10/22  0835 06/10/22  1341 06/10/22  1710   TROPONINI 0.80* 0.79* 0.79*       Urinalysis:      Lab Results   Component Value Date    NITRU Negative 05/19/2022    WBCUA 3-5 05/19/2022    BACTERIA Rare 05/25/2018    RBCUA 3-4 05/19/2022    BLOODU SMALL 05/19/2022    SPECGRAV 1.010 05/19/2022    GLUCOSEU Negative 05/19/2022       Radiology:  MRI BRAIN WO CONTRAST   Final Result   There are approximately 4 punctate areas of restricted diffusion consistent   with acute areas of infarct. There is a punctate area of restricted   diffusion in the left periventricular white matter, the right parietal lobe,   the right occipital lobe, and the left occipital lobe. An embolic source   should be considered. Cerebral atrophy. Moderate chronic small vessel ischemic changes. Remote   infarct in the right cerebellum. XR CHEST PORTABLE   Final Result   Mild vascular congestion is stable. CTA HEAD NECK W CONTRAST   Final Result   1. Severe (greater than 70%) stenosis of the proximal left ICA. 2. The right vertebral artery V4 segment after the PICA takeoff is not well   visualized and may be occluded or congenitally diminutive. 3. The basilar artery appears diminutive, may in part be congenital given   presence of bilateral posterior communicating arteries. 4. The remaining major intracranial arteries appear patent without   significant stenosis.    5. Multilevel cervical spondylosis with associated      DVT Prophylaxis: dc heparin - SCD  Diet: ADULT DIET; Dysphagia - Pureed; Low Phosphorus (Less than 1000 mg);  Less than 60 gm  Code Status: Full Code    PT/OT Eval Status: ordered     Dispo -1-2 days     Willie Ruth MD

## 2022-06-12 LAB
ALBUMIN SERPL-MCNC: 2.1 G/DL (ref 3.4–5)
ANION GAP SERPL CALCULATED.3IONS-SCNC: 10 MMOL/L (ref 3–16)
BUN BLDV-MCNC: 22 MG/DL (ref 7–20)
CALCIUM SERPL-MCNC: 8.3 MG/DL (ref 8.3–10.6)
CHLORIDE BLD-SCNC: 99 MMOL/L (ref 99–110)
CO2: 27 MMOL/L (ref 21–32)
CREAT SERPL-MCNC: 3.5 MG/DL (ref 0.8–1.3)
GFR AFRICAN AMERICAN: 21
GFR NON-AFRICAN AMERICAN: 17
GLUCOSE BLD-MCNC: 127 MG/DL (ref 70–99)
HCT VFR BLD CALC: 21.5 % (ref 40.5–52.5)
HCT VFR BLD CALC: 21.9 % (ref 40.5–52.5)
HEMOGLOBIN: 7 G/DL (ref 13.5–17.5)
HEMOGLOBIN: 7 G/DL (ref 13.5–17.5)
HEPATITIS B CORE TOTAL ANTIBODY: NEGATIVE
MCH RBC QN AUTO: 27.2 PG (ref 26–34)
MCHC RBC AUTO-ENTMCNC: 31.9 G/DL (ref 31–36)
MCV RBC AUTO: 85.2 FL (ref 80–100)
PDW BLD-RTO: 18.2 % (ref 12.4–15.4)
PHOSPHORUS: 2.5 MG/DL (ref 2.5–4.9)
PLATELET # BLD: 412 K/UL (ref 135–450)
PMV BLD AUTO: 6.9 FL (ref 5–10.5)
POTASSIUM REFLEX MAGNESIUM: 3.7 MMOL/L (ref 3.5–5.1)
POTASSIUM SERPL-SCNC: 3.7 MMOL/L (ref 3.5–5.1)
RBC # BLD: 2.56 M/UL (ref 4.2–5.9)
SODIUM BLD-SCNC: 136 MMOL/L (ref 136–145)
WBC # BLD: 12 K/UL (ref 4–11)

## 2022-06-12 PROCEDURE — 97530 THERAPEUTIC ACTIVITIES: CPT

## 2022-06-12 PROCEDURE — 36415 COLL VENOUS BLD VENIPUNCTURE: CPT

## 2022-06-12 PROCEDURE — 97166 OT EVAL MOD COMPLEX 45 MIN: CPT

## 2022-06-12 PROCEDURE — 6370000000 HC RX 637 (ALT 250 FOR IP): Performed by: INTERNAL MEDICINE

## 2022-06-12 PROCEDURE — 97162 PT EVAL MOD COMPLEX 30 MIN: CPT

## 2022-06-12 PROCEDURE — 85014 HEMATOCRIT: CPT

## 2022-06-12 PROCEDURE — 99223 1ST HOSP IP/OBS HIGH 75: CPT | Performed by: SURGERY

## 2022-06-12 PROCEDURE — 85027 COMPLETE CBC AUTOMATED: CPT

## 2022-06-12 PROCEDURE — 1200000000 HC SEMI PRIVATE

## 2022-06-12 PROCEDURE — 85018 HEMOGLOBIN: CPT

## 2022-06-12 PROCEDURE — 80069 RENAL FUNCTION PANEL: CPT

## 2022-06-12 RX ADMIN — ATORVASTATIN CALCIUM 20 MG: 10 TABLET, FILM COATED ORAL at 07:59

## 2022-06-12 RX ADMIN — CARVEDILOL 3.12 MG: 3.12 TABLET, FILM COATED ORAL at 07:59

## 2022-06-12 RX ADMIN — CLOPIDOGREL BISULFATE 75 MG: 75 TABLET ORAL at 10:06

## 2022-06-12 RX ADMIN — DOXAZOSIN 1 MG: 2 TABLET ORAL at 07:59

## 2022-06-12 RX ADMIN — SEVELAMER CARBONATE 800 MG: 800 TABLET, FILM COATED ORAL at 17:55

## 2022-06-12 RX ADMIN — ASPIRIN 81 MG: 81 TABLET, COATED ORAL at 10:06

## 2022-06-12 RX ADMIN — NEPHROCAP 1 MG: 1 CAP ORAL at 07:59

## 2022-06-12 RX ADMIN — CALCIUM 500 MG: 500 TABLET ORAL at 07:59

## 2022-06-12 RX ADMIN — SEVELAMER CARBONATE 800 MG: 800 TABLET, FILM COATED ORAL at 07:59

## 2022-06-12 RX ADMIN — CARVEDILOL 3.12 MG: 3.12 TABLET, FILM COATED ORAL at 17:54

## 2022-06-12 RX ADMIN — SEVELAMER CARBONATE 800 MG: 800 TABLET, FILM COATED ORAL at 12:05

## 2022-06-12 RX ADMIN — LEVOTHYROXINE SODIUM 137 MCG: 0.03 TABLET ORAL at 05:46

## 2022-06-12 ASSESSMENT — PAIN SCALES - GENERAL
PAINLEVEL_OUTOF10: 0

## 2022-06-12 NOTE — CONSULTS
Consult placed    Who:HIPOLITO QUINTERO  Date:6/12/2022,  Time:9:18 AM        Electronically signed by Ifeoma Plasencia on 6/12/2022 at 9:18 AM

## 2022-06-12 NOTE — PROGRESS NOTES
Hospitalist Progress Note      PCP: Betsy Selby MD    Date of Admission: 6/10/2022    Chief Complaint: Right sided weakness    Hospital Course:  h and p reviewed     Subjective: At HD  Denies GIB/ n or V  No dizziness or syncope or CP or sob   No obvious weakness over the limbs      Medications:  Reviewed    Infusion Medications    sodium chloride       Scheduled Medications    b complex-C-folic acid  1 capsule Oral Daily    [START ON 6/14/2022] epoetin katelynn-epbx  10,000 Units IntraVENous Once per day on Tue Thu Sat    atorvastatin  20 mg Oral Daily    calcium elemental  500 mg Oral Daily    carvedilol  3.125 mg Oral BID WC    clopidogrel  75 mg Oral Daily    doxazosin  1 mg Oral Daily    levothyroxine  137 mcg Oral Daily    sevelamer  800 mg Oral TID WC    aspirin  81 mg Oral Daily    Or    aspirin  300 mg Rectal Daily     PRN Meds: sodium chloride, ondansetron **OR** ondansetron, polyethylene glycol      Intake/Output Summary (Last 24 hours) at 6/12/2022 0858  Last data filed at 6/11/2022 1816  Gross per 24 hour   Intake 1090 ml   Output 2300 ml   Net -1210 ml       Physical Exam Performed:    BP (!) 130/54   Pulse 90   Temp 98.4 °F (36.9 °C) (Oral)   Resp 22   Ht 5' 10\" (1.778 m)   Wt 169 lb 1.5 oz (76.7 kg)   SpO2 98%   BMI 24.26 kg/m²     General appearance: No apparent distress, appears stated age and c. Frail   HEENT: . Conjunctivae/corneas clear. Neck: Supple, with full range of motion. No jugular venous distention. Trachea midline. Respiratory:  Normal respiratory effort. Reduced AE , bilaterally without Rales/Wheezes/Rhonchi. Cardiovascular: Regular rate and rhythm with normal S1/S2 without murmurs, rubs or gallops. Abdomen: Soft, non-tender, non-distended with normal bowel sounds. Musculoskeletal: No clubbing, cyanosis or edema bilaterally. Skin: Skin color, texture, turgor normal.  No rashes or lesions.   Neurologic:   Move all 4 extremities , no facial weakness Psychiatric: Alert and oriented x 2 ,         Labs:   Recent Labs     06/10/22  0835 06/10/22  0835 06/11/22  0647 06/11/22  1244 06/12/22  0216   WBC 16.0*  --  12.3*  --  12.0*   HGB 7.1*   < > 6.6* 7.7* 7.0*   HCT 22.2*   < > 20.9* 24.0* 21.9*   *  --  484*  --  412    < > = values in this interval not displayed. Recent Labs     06/10/22  0835 06/11/22  0647 06/12/22  0216   * 135* 136   K 3.5 3.6 3.7  3.7   CL 97* 97* 99   CO2 27 25 27   BUN 29* 37* 22*   CREATININE 4.2* 5.4* 3.5*   CALCIUM 8.7 9.1 8.3   PHOS  --  3.6 2.5     Recent Labs     06/10/22  0835   AST 27   ALT 13   BILITOT 0.6   ALKPHOS 611*     No results for input(s): INR in the last 72 hours. Recent Labs     06/10/22  0835 06/10/22  1341 06/10/22  1710   TROPONINI 0.80* 0.79* 0.79*       Urinalysis:      Lab Results   Component Value Date    NITRU Negative 05/19/2022    WBCUA 3-5 05/19/2022    BACTERIA Rare 05/25/2018    RBCUA 3-4 05/19/2022    BLOODU SMALL 05/19/2022    SPECGRAV 1.010 05/19/2022    GLUCOSEU Negative 05/19/2022       Radiology:  MRI BRAIN WO CONTRAST   Final Result   There are approximately 4 punctate areas of restricted diffusion consistent   with acute areas of infarct. There is a punctate area of restricted   diffusion in the left periventricular white matter, the right parietal lobe,   the right occipital lobe, and the left occipital lobe. An embolic source   should be considered. Cerebral atrophy. Moderate chronic small vessel ischemic changes. Remote   infarct in the right cerebellum. XR CHEST PORTABLE   Final Result   Mild vascular congestion is stable. CTA HEAD NECK W CONTRAST   Final Result   1. Severe (greater than 70%) stenosis of the proximal left ICA. 2. The right vertebral artery V4 segment after the PICA takeoff is not well   visualized and may be occluded or congenitally diminutive.    3. The basilar artery appears diminutive, may in part be congenital given   presence of bilateral posterior communicating arteries. 4. The remaining major intracranial arteries appear patent without   significant stenosis. 5. Multilevel cervical spondylosis with associated severe neural foraminal   narrowing as well as C5-C6 spinal canal stenosis. 6. Scattered periapical lucency such as around the left lower central incisor   and right lower 1st molar. Can correlate with dental examination. CT HEAD WO CONTRAST   Final Result   No acute intracranial abnormality. The findings were sent to the Radiology Results [address] at 8:43   am on 6/10/2022 to be communicated to a licensed caregiver. Assessment/Plan:    Active Hospital Problems    Diagnosis     Moderate malnutrition ([facility] [E44.0]      Priority: Medium    ESRD (end stage renal disease) ([facility] [N18.6]      Priority: Medium    Acute CVA (cerebrovascular accident) ([facility] [I63.9]      Priority: Medium    Left carotid stenosis [I65.22]      Priority: Medium    Severe aortic stenosis [I35.0]      Priority: Medium    Elevated troponin [R77.8]      Priority: Medium    NSTEMI (non-ST elevated myocardial infarction) ([facility]) [I21.4]     Hypothyroidism [E03.9]     Essential hypertension [I10]     Type 2 diabetes mellitus with renal complication ([facility]) [L42.89]      Possible recurrent Acute CVA: Right sided weakness is new compared to prior CVA where he had right/posterior CVA. CTA noted. Concern that left ICA may be contributing. Repeat Brain MRI possible right and left occipital embolic CVA,. Neuro evaluation. appreciated  PT/OT/SLP. Tele. ECHO done recently. Vascular consult    Event monitor at dc      Elevated troponin: Similar to prior, elevation is likely demand ischemia in setting of ESRD, severe AS. CVA could be contributing. No CP/SOB. Recent work-up including Stess and ECHO. Plan was for outpatient TAVR eval. Monitor.      ESRD (end stage renal disease): Nephrology evaluation.  getting HD now

## 2022-06-12 NOTE — CONSULTS
Consult placed    [facility], [address] [facility]  Date:6/12/2022,  Time:3:30 PM        Electronically signed by Nahed Sherman on 6/12/2022 at 3:30 PM

## 2022-06-12 NOTE — PROGRESS NOTES
[facility] Progress Note           Subjective/   66y.o. year old male who we are seeing in consultation for ESKD on HD. HPI:  Last HD on 6/11 with 1.5 liters removed, post-weight of 76.7 kg? .  +weak. ROS:  Intake reduced, no shortness of breath. Objective/   GEN:  Chronically ill, /71   Pulse 81   Temp 97.8 °F (36.6 °C) (Axillary)   Resp 20   Ht 5' 10\" (1.778 m)   Wt 169 lb 1.5 oz (76.7 kg)   SpO2 96%   BMI 24.26 kg/m²   HEENT: non-icteric, no JVD  CV: S1, S2 with +amanda, no r/g; no LE edema  RESP: CTA B without w/r/r; breathing wnl  ABD: +bs, soft, nt, no hsm  SKIN: warm, no rashes  ACCESS: R [facility]    Data/  Recent Labs     06/10/22  0835 06/10/22  0835 06/11/22  0647 06/11/22  1244 06/12/22  0216   WBC 16.0*  --  12.3*  --  12.0*   HGB 7.1*   < > 6.6* 7.7* 7.0*   HCT 22.2*   < > 20.9* 24.0* 21.9*   MCV 85.1  --  85.2  --  85.2   *  --  484*  --  412    < > = values in this interval not displayed. Recent Labs     06/10/22  0835 06/11/22  0647 06/12/22  0216   * 135* 136   K 3.5 3.6 3.7  3.7   CL 97* 97* 99   CO2 27 25 27   GLUCOSE 142* 113* 127*   PHOS  --  3.6 2.5   MG 1.70*  --   --    BUN 29* 37* 22*   CREATININE 4.2* 5.4* 3.5*   LABGLOM 14* 10* 17*   GFRAA 17* 13* 21*       Assessment/     - End stage kidney disease - on HD Tues-Thurs-Sat     - Suspicion for embolic source to recurrent CVA's              Punctate areas noted in the left periventricular white matter, right parietal lobe,               right occipital lobe, and left occipital lobe.  Previous right cerebellar infarct.    Vascular surgery consulted     - Left carotid artery stenosis     - Aortic stenosis - moderate to severe     - Hypertension     - DM2     - Anemia - VIRAJ with HD, prn prbc's     - Hypothyroidism    - Malnutrition - started on IDPN at outpatient HD    Plan/     - Next HD Tuesday, under listed outpatient EDW of 83.5 kg, suspect bed weight inaccuracy  - VIRAJ with HD, prn prbc's  - Trend labs, bp's    ____________________________________  Gerardo Reyes MD  [facility]  [email or web address]  Office: [phone]

## 2022-06-12 NOTE — PROGRESS NOTES
TODAYS DATE:  6/12/2022    Discussed personal risk factors for Stroke /TIA with patient/family, and ways to reduce the risk for a recurrent stroke. Patient's personal risk factors which were identified are:     [] Alcohol Abuse: check with your physician before any alcohol consumption. [] Atrial fibrillation: may cause blood clots. [] Drug Abuse: Seek help, talk with your doctor  [] Clotting Disorder  [] Diabetes  [x] Family history of stroke or heart disease  [x] High Blood Pressure/Hypertension: work with your physician.  [] High cholesterol: monitor cholesterol levels with your physician.   [] Overweight/Obesity: work with your physician for your ideal body weight. [x] Physical Inactivity: get regular exercise as directed by your physician. [] Personal history of previous TIA or stroke  [] Poor Diet; decrease salt (sodium) in your diet, follow diet directed by physician. [] Smoking: Cigarette/Cigar: stop smoking. Reviewed the Following Education with Patient and/or Family:   -Signs and Symptoms of Stroke:     (facial droop, weakness/numbness especially on one side, speech difficulty, sudden confusion, sudden loss of vision, sudden severe headache,       sudden loss of balance or having difficulty walking, syncope or seizure)  -How to activate EMS (911)   -Importance of Follow Up Appointment at Discharge   -Importance of Compliance with Medications Prescribed at Discharge     Pt verbalized understanding.      Family Present during Education: No  Stroke Education Booklet at [facility] signed by Carlota Bradley RN on 6/12/2022 at 5:01 AM

## 2022-06-12 NOTE — PROGRESS NOTES
Physical Therapy  Facility/Department: [facility] B3 - MED SURG  Physical Therapy Initial Assessment/Treatment    Name: Svetlana Berumen  : 1950  MRN: [id number]  Date of Service: 2022    Discharge Recommendations:  Subacute/Skilled Nursing Facility   PT Equipment Recommendations  Equipment Needed: No  Other: defer to next level of care      Patient Diagnosis(es): The primary encounter diagnosis was Altered mental status, unspecified altered mental status type. Diagnoses of Right-sided sensory deficit present, Right sided weakness, and Confusion were also pertinent to this visit. Past Medical History:  has a past medical history of Anemia, Diabetes mellitus ([facility] ESRD (end stage renal disease) on dialysis ([facility] Hyperlipidemia, and Hypertension. Past Surgical History:  has no past surgical history on file. Assessment   Body Structures, Functions, Activity Limitations Requiring Skilled Therapeutic Intervention: Decreased functional mobility ; Decreased endurance;Decreased cognition;Decreased coordination;Decreased ROM; Decreased sensation;Decreased balance;Decreased posture;Decreased strength;Decreased safe awareness  Assessment: Pt is a 66 y.o. male admitted to [facility] secondary to being found down at [facility] with new R sided weakness with B CVA. Prior to previous admission in may pt lived with wife and was I with functional mobility and gait. Pt was d/c to SNF, unsure of PORFIRIO or mobility pt was completing at SNF. Pt is currently functioning well below his baseline requiring maxA/TD x 2 for bed mobility and to maintain sitting balance at EOB. Pt maintains heavy R lateral and posterior lean with mobility and forward flexed head with minimal active correction despite cues. Pt also very lethargic this date. Unsafe to attempt t/f at this time. Pt will benefit from continued skilled PT In acute care setting to address above deficits. Recommend pt return to SNF at d/c.   Treatment Diagnosis: Impaired balance and functional mobility  Specific Instructions for Next Treatment: progress mobility as tolerated  Therapy Prognosis: Fair  Decision Making: Medium Complexity  Barriers to Learning: cognition, lethargy  Requires PT Follow-Up: Yes  Activity Tolerance  Activity Tolerance: Patient limited by fatigue;Patient limited by pain; Patient limited by endurance     Plan   Plan  Plan: 3-5 times per week  Specific Instructions for Next Treatment: progress mobility as tolerated  Current Treatment Recommendations: Joann Abebe mobility training,Equipment evaluation, education, & procurement,Balance training,Gait training,Functional mobility training,Positioning,Home exercise program,Neuromuscular re-education,Transfer training,Safety education & training,Therapeutic activities,Patient/Caregiver education & training,Endurance training  Safety Devices  Type of Devices: All jeovany prominences offloaded,Left in bed,Call light within reach,Bed alarm in place,Nurse notified,Patient at risk for falls     Restrictions  Restrictions/Precautions  Restrictions/Precautions: Fall Risk,General Precautions  Position Activity Restriction  Other position/activity restrictions: Telemetry, R chest port, IV, HD     Subjective   Pain: Pt denies c/o pain  General  Chart Reviewed: Yes  Patient assessed for rehabilitation services?: Yes  Additional Pertinent Hx: Per Marce Noel H&P \"70 y.o.  male, with a past medical history of DM2, ESRD on HD, hypertension, CVA, asymptomatic left carotid stenosis, and severe AS, who presented to [facility] from [facility]. He was found by the staff kneeling on the ground.   He reportedly had new weakness on the right side\"  Response To Previous Treatment: Not applicable  Family / Caregiver Present: No  Referring Practitioner: Nelda Gaston MD  Referral Date : 06/12/22  Diagnosis: CVA  Follows Commands: Impaired  General Comment  Comments: RN cleared pt for session  Subjective  Subjective: Pt resting in bed on approach, agreeable to PT evaluation, very lethargic throughout session         Social/Functional History  Social/Functional History  Lives With: Spouse  Type of Home: House  Home Layout: One level  Home Access: Stairs to enter with rails  Entrance Stairs - Number of Steps: 5 BALTAZAR  Bathroom Shower/Tub: Walk-in shower (Pt complete sponge baths)  Bathroom Toilet: Handicap height  Bathroom Equipment: Grab bars in shower  Home Equipment: Coleen Hand  Has the patient had two or more falls in the past year or any fall with injury in the past year?: No  Receives Help From: Family  ADL Assistance: Needs assistance (Per chart pt requires assist for a few prior to intial admission)  Ambulation Assistance: Independent (with 8MD)  Transfer Assistance: Independent  Active : No  Occupation: Retired  Additional Comments: Pt is questionable historian, above info from previous admission (5/20). Pt was admitted from SNF unsure of PORFIRIO required for mobility at SNF (pt reports A x 1 for t/f and gait - question accuracy)  Vision/Hearing  Vision  Vision: Impaired  Vision Exceptions: Wears glasses at all times  Hearing  Hearing: Within functional limits    Cognition   Orientation  Overall Orientation Status: Impaired  Orientation Level: Oriented to person;Disoriented to place; Disoriented to situation;Disoriented to time     Objective   Heart Rate: 79  Heart Rate Source: Monitor  BP: 109/71  BP Location: Right upper arm  BP Method: Automatic  Patient Position: High fowlers  MAP (Calculated): 83.67  Resp: 20  SpO2: 97 %  O2 Device: None (Room air)     Observation/Palpation  Posture: Poor  Observation: Heavy R and posterior lean in sitting, maintains head down  Gross Assessment  AROM: Grossly decreased, non-functional  PROM: Within functional limits  Strength: Grossly decreased, non-functional  Coordination: Grossly decreased, non-functional  Tone: Abnormal (hypotonic RLE/RUE)                    Bed mobility  Rolling to Left: Dependent/Total (maxA/TD, cues for use of BR)  Rolling to Right: Dependent/Total (maxA/TD, cues for use of BR)  Supine to Sit: Dependent/Total;2 Person assistance (maxA/TD x 2, HOB elevated, pt does intiate movement but requires maxA x 2 at trunk and BLE to achieve full upright)  Sit to Supine: Dependent/Total;2 Person assistance (maxA/TD x 2, HOB flat, max cues for sequence and technique, increased time to complete. requires A at trunk and BLE)  Scooting: Dependent/Total (with hercules bed)  Transfers  Sit to Stand: Unable to assess  Stand to sit: Unable to assess  Comment: unsafe to attempt, heavy R posterior lean in sitting despite maxA x 2 and minimal attempts to correct despite max cues. Balance  Posture: Poor  Sitting - Static: Poor  Sitting - Dynamic: Poor  Comments: Pt sitting EOB x 3 minutes requires maxA x 2 to maintain upright, pt maintains posterior pelvic tilt, heavy posterior and R lateral lean and maintains forward flexed head, cues provided to correct upright postioning with minimal active correction noted. AM-PAC Score     AM-PAC Inpatient Mobility without Stair Climbing Raw Score : 7 (06/12/22 1429)  AM-PAC Inpatient without Stair Climbing T-Scale Score : 28.66 (06/12/22 1429)  Mobility Inpatient CMS 0-100% Score: 86.29 (06/12/22 1429)  Mobility Inpatient without Stair CMS G-Code Modifier : CM (06/12/22 1429)       Goals  Short Term Goals  Time Frame for Short term goals: 1 week 6/19/22  Short term goal 1: Pt will complete supine to/from sit with modA x 2  Short term goal 2: Pt will complete bed <> chair t/f with lift equipment or maxA x 2 with STEDY to promote OOB mobility  Short term goal 3: Pt will demonstrate improved sitting balance maintaining x 8 minutes with Lorena without overt LOB  Short term goal 4: 6/15/22: Pt will participate in 10-12 reps BLE TE to improve strength and ROM and improve performance with functional mobility and gait.   Patient Goals   Patient goals : Pt is unable to state d/t cognition       Education  Patient Education  Education Given To: Patient  Education Provided: Role of Therapy;Plan of Care;Orientation;Precautions  Education Provided Comments: Educated on sequence with mobility, importance of OOB mobility and progression of activity  Education Method: Verbal  Barriers to Learning: Cognition  Education Outcome: Unable to demonstrate understanding;Continued education needed      Therapy Time   Individual Concurrent Group Co-treatment   Time In 1329         Time Out 1404         Minutes 35         Timed Code Treatment Minutes: 25 Minutes (10 minutes for eval)       If pt is unable to be seen after this session, please let this note serve as discharge summary. Please see case management note for discharge disposition. Thank you.     Fiorella Cornelius, PT, DPT

## 2022-06-12 NOTE — PROGRESS NOTES
Occupational Therapy  Facility/Department: [facility]  Occupational Therapy Initial Assessment    Name: Stiven Almanza  : 1950  MRN: [id number]  Date of Service: 2022    Discharge Recommendations:  Subacute/Skilled Nursing Facility   Barriers to home discharge:   [x] Steps to access home entry or bed/bath:   [x] Reported available assist at home upon discharge limited       Patient Diagnosis(es): The primary encounter diagnosis was Altered mental status, unspecified altered mental status type. Diagnoses of Right-sided sensory deficit present, Right sided weakness, and Confusion were also pertinent to this visit. Past Medical History:  has a past medical history of Anemia, Diabetes mellitus ([facility] [address] ESRD (end stage renal disease) on dialysis ([facility] [address] Hyperlipidemia, and Hypertension. Past Surgical History:  has no past surgical history on file. Assessment   Performance deficits / Impairments: Decreased safe awareness;Decreased balance;Decreased ADL status; Decreased cognition;Decreased strength;Decreased endurance  Assessment: per chart review pt normally independent with BADL's & functional mobility with 4 WW prior to admission May 2022; pt readmitted with altered mental status from SNF, now with poor to absent sitting Balance EOB, dependent of 2 for bed mobility/supine<-->sit & dependent with self care; pt to benefit from skilled OT services as tolerated  REQUIRES OT FOLLOW-UP: Yes  Activity Tolerance  Activity Tolerance: Patient limited by fatigue;Treatment limited secondary to decreased cognition  Activity Tolerance Comments: vitals stable at onset of eval        Plan   Plan  Times per Week: 2-3 x/week  Current Treatment Recommendations: Balance training,Safety education & training,Patient/Caregiver education & training,ROM,Neuromuscular re-education,Self-Care / ADL     Restrictions  Restrictions/Precautions  Restrictions/Precautions: Fall Risk,General Precautions  Position Activity Restriction  Other position/activity restrictions: Telemetry, R chest port, IV, HD    Subjective   General  Chart Reviewed: Yes  Patient assessed for rehabilitation services?: Yes  Family / Caregiver Present: No  Referring Practitioner: Dr. Michael Hogue  Diagnosis: confusion, CVA with new Right side weakness  General Comment  Comments: RN cleared pt for OT eval; pt somnolent, minimally participatory  Pain: Pt denies c/o pain  Social/Functional History  Social/Functional History  Lives With: Spouse  Type of Home: House  Home Layout: One level  Home Access: Stairs to enter with rails  Entrance Stairs - Number of Steps: 5 BALTAZAR  Bathroom Shower/Tub: Walk-in shower (Pt complete sponge baths)  Bathroom Toilet: Handicap height  Bathroom Equipment: Grab bars in shower  Home Equipment: Tamy Vergara  Has the patient had two or more falls in the past year or any fall with injury in the past year?: No  Receives Help From: Family  ADL Assistance: Needs assistance (Per chart pt requires assist for a few prior to intial admission)  Ambulation Assistance: Independent (with 5KI)  Transfer Assistance: Independent  Active : No  Occupation: Retired  Additional Comments: Pt is questionable historian, above info from previous admission (5/20). Pt was admitted from SNF unsure of PORFIRIO required for mobility at SNF (pt reports A x 1 for t/f and gait - question accuracy)       Objective   Heart Rate: 79  Heart Rate Source: Monitor  BP: 109/71  BP Location: Right upper arm  BP Method: Automatic  Patient Position: High fowlers  MAP (Calculated): 83.67  Resp: 20  SpO2: 97 %  O2 Device: None (Room air)          Observation/Palpation  Posture: Poor  Observation: Heavy R and posterior lean in sitting, maintains head down  Safety Devices  Type of Devices: All jeovany prominences offloaded;Left in bed;Call light within reach; Bed alarm in place;Nurse notified                    Bed mobility  Rolling to Left: Dependent/Total  Rolling to Right: Dependent/Total  Supine to Sit: 2 Person assistance (max assist of 2 log roll to RIght)  Sit to Supine: 2 Person assistance (max assist of 2)  Bed Mobility Comments: sat EOB ~ 2 minutes, heavy lean to RIght & posteriorly, no balance reactions  Transfers  Transfer Comments: SOFIA d/t absent sitting balance EOB, poor cognition     Cognition  Overall Cognitive Status: Exceptions  Arousal/Alertness: Inconsistent responses to stimuli  Following Commands: Inconsistently follows commands  Attention Span: Difficulty dividing attention  Memory: Decreased short term memory;Decreased recall of recent events;Decreased long term memory;Decreased recall of precautions  Safety Judgement: Decreased awareness of need for safety;Decreased awareness of need for assistance  Insights: Not aware of deficits  Initiation: Requires cues for all  Sequencing: Requires cues for all                  Education Given To: Patient  Education Provided: Role of Therapy;Plan of Care;Precautions  Education Provided Comments: disease specific: importance of use RED/nurse call light for Assist with ADL's, positioning  Education Method: Verbal;Demonstration  Barriers to Learning: Cognition  Education Outcome: Unable to demonstrate understanding;Continued education needed          AM-PAC Score        AM-[facility] Inpatient Daily Activity Raw Score: 6 (06/12/22 1412)  AM-PAC Inpatient ADL T-Scale Score : 17.07 (06/12/22 1412)  ADL Inpatient CMS 0-100% Score: 100 (06/12/22 1412)  ADL Inpatient CMS G-Code Modifier : CN (06/12/22 1412)    Goals  Short Term Goals  Time Frame for Short term goals: 1 week(6-19-22)  Short Term Goal 1: set up for UE light ADL's in supported sitting in bed or chair by 6-19-22  Short Term Goal 2: min assist with 10 reps  BUE AAROM exercises by 6-17-22  Patient Goals   Patient goals : unable to identify/verbalize at this time       Therapy Time   Individual Concurrent Group Co-treatment   Time In  1350         Time Out [address] [address], [place]

## 2022-06-12 NOTE — CONSULTS
Vascular Surgery Consultation    Anusha Bowles  [id number]  6/12/2022 1950    Date of Admission:  6/10/2022  8:06 AM  Date of Consultation:  6/12/2022    PCP:  Elena Cage MD       Chief Complaint: Left ICA stenosis    History of Present Illness:   Anusha Bowles is a 66 y.o. male who admitted on 6/10, from his SNF after being found down. He had increased right side weakness and aphasia. Apparently, he was recently admitted in May for CVA, but prior to that admission, was living at home with wife. He is a poor historian. Able to indicate to me that he is unable to express what he wants to say. He appears very weak. It is difficult for him to stay awake throughout the conversation. He is unable to feed himself. He had a CTA neck which showed a greater than 70% left ICA stenosis. MRI of the brain was notable for bilateral hemispheric strokes. Past Medical History:  Past Medical History:   Diagnosis Date    Anemia     Diabetes mellitus ([facility] [address]     ESRD (end stage renal disease) on dialysis ([facility] [address]     Tues-Thurs-Sat    Hyperlipidemia     Hypertension        Past Surgical History:  History reviewed. No pertinent surgical history. Home Medications:   Prior to Admission medications    Medication Sig Start Date End Date Taking? Authorizing Provider   epoetin katelynn (EPOGEN;PROCRIT) 4000 UNIT/ML injection Inject 8,000 Units into the skin three times a week T, Th, S with dialysis.    Yes Historical Provider, MD   doxazosin (CARDURA) 1 MG tablet Take 1 tablet by mouth daily 5/26/22   Amber Levine MD   clopidogrel (PLAVIX) 75 MG tablet Take 1 tablet by mouth daily 5/26/22   Amber Levine MD   megestrol (MEGACE) 40 MG/ML suspension Take 15.6 mLs by mouth daily 5/26/22   Amber Levine MD   sevelamer (RENVELA) 800 MG tablet Take 1 tablet by mouth 3 times daily (with meals)    Historical Provider, MD   carvedilol (COREG) 3.125 MG tablet Take 1 tablet by mouth 2 times daily (with meals) 5/10/18   Guanako Mora MD   atorvastatin (LIPITOR) 20 MG tablet Take 1 tablet by mouth daily 18   Guanako Mora MD   nitroGLYCERIN (NITROSTAT) 0.4 MG SL tablet up to max of 3 total doses. If no relief after 1 dose, call 911. Patient not taking: Reported on 6/10/2022 5/10/18   Guanako Mora MD   calcium elemental (OSCAL) 500 MG TABS tablet Take 1 tablet by mouth daily 18   Luann Jamison MD   levothyroxine (SYNTHROID) 125 MCG tablet Take 137 mcg by mouth Daily     Historical Provider, MD        Facility Administered Medications:    b complex-C-folic acid  1 capsule Oral Daily    [START ON 2022] epoetin katelynn-epbx  10,000 Units IntraVENous Once per day on  Sat    atorvastatin  20 mg Oral Daily    calcium elemental  500 mg Oral Daily    carvedilol  3.125 mg Oral BID WC    clopidogrel  75 mg Oral Daily    doxazosin  1 mg Oral Daily    levothyroxine  137 mcg Oral Daily    sevelamer  800 mg Oral TID WC    aspirin  81 mg Oral Daily    Or    aspirin  300 mg Rectal Daily       Allergies:  Patient has no known allergies.      Social History:      Social History     Socioeconomic History    Marital status:      Spouse name: Not on file    Number of children: Not on file    Years of education: Not on file    Highest education level: Not on file   Occupational History    Not on file   Tobacco Use    Smoking status: Former Smoker     Packs/day: 0.50     Types: Cigarettes     Quit date: 2018     Years since quittin.2    Smokeless tobacco: Never Used   Vaping Use    Vaping Use: Never used   Substance and Sexual Activity    Alcohol use: No     Alcohol/week: 35.0 standard drinks     Types: 35 Cans of beer per week     Comment: last drink  \"Pt states he hasn't drank in four weeks\"    Drug use: No    Sexual activity: Not Currently   Other Topics Concern    Not on file   Social History Narrative    Not on file     Social Determinants of Health     Financial Resource Strain:     Difficulty of Paying Living Expenses: Not on file   Food Insecurity:     Worried About Running Out of Food in the Last Year: Not on file    Margarita of Food in the Last Year: Not on file   Transportation Needs:     Lack of Transportation (Medical): Not on file    Lack of Transportation (Non-Medical): Not on file   Physical Activity:     Days of Exercise per Week: Not on file    Minutes of Exercise per Session: Not on file   Stress:     Feeling of Stress : Not on file   Social Connections:     Frequency of Communication with Friends and Family: Not on file    Frequency of Social Gatherings with Friends and Family: Not on file    Attends Jewish Services: Not on file    Active Member of [employer] or Organizations: Not on file    Attends Club or Organization Meetings: Not on file    Marital Status: Not on file   Intimate Partner Violence:     Fear of Current or Ex-Partner: Not on file    Emotionally Abused: Not on file    Physically Abused: Not on file    Sexually Abused: Not on file   Housing Stability:     Unable to Pay for Housing in the Last Year: Not on file    Number of [place] in the Last Year: Not on file    Unstable Housing in the Last Year: Not on file       Review of Systems:  Review of systems not obtained due to patient factors. Physical Examination:    BP (!) 130/54   Pulse 90   Temp 98.4 °F (36.9 °C) (Oral)   Resp 22   Ht 5' 10\" (1.778 m)   Wt 169 lb 1.5 oz (76.7 kg)   SpO2 98%   BMI 24.26 kg/m²        Admission Weight: 172 lb 8 oz (78.2 kg)     General appearance: Very frail-appearing gentleman, appears older than his stated age. He awakens with prompting and briefly answers questions, though quickly goes back to sleep. He appears to have some expressive aphasia. Eyes: PERRLA  Neck: Supple, no masses. No JVD. Carotids are 2+ bilaterally without bruit  Respiratory: Relatively clear. No wheezes.   Cardiovascular: RRR no m/r/g  Abdomen:  Soft, NT/ND, no r/g  Extremities: no cyanosis, clubbing or edema present; no obvious deformities  Skin: warm and dry, no rash or erythema  Neuro/psychiatric: Expressive aphasia. Able to move all extremities, though right arm appears weak 3/5. He is able to lift it off the bed. Labs:   CBC:   Recent Labs     06/10/22  0835 06/10/22  0835 06/11/22  0647 06/11/22  1244 06/12/22  0216   WBC 16.0*  --  12.3*  --  12.0*   HGB 7.1*   < > 6.6* 7.7* 7.0*   HCT 22.2*   < > 20.9* 24.0* 21.9*   MCV 85.1  --  85.2  --  85.2   *  --  484*  --  412    < > = values in this interval not displayed. BMP:   Recent Labs     06/10/22  0835 06/11/22  0647 06/12/22  0216   * 135* 136   K 3.5 3.6 3.7  3.7   CL 97* 97* 99   CO2 27 25 27   PHOS  --  3.6 2.5   BUN 29* 37* 22*   CREATININE 4.2* 5.4* 3.5*   CALCIUM 8.7 9.1 8.3   MG 1.70*  --   --      Cardiac Enzymes:   Recent Labs     06/10/22  0835 06/10/22  1341 06/10/22  1710   TROPONINI 0.80* 0.79* 0.79*       Diagnosis:    Acute CVA  Severe L ICA stenosis    Plan:   Given the distribution of the stroke, I do not think that the ICA stenosis is the source of his CVA. That said, he does not appear in any way to be a good candidate for surgical intervention at this time. Recommend treating this medically for now and agree with Holter monitor to assess for PAF. Left ICA stenosis can be further addressed at another time, should he have a reasonable recovery from current medical issues.

## 2022-06-12 NOTE — PLAN OF CARE
Problem: Discharge Planning  Goal: Discharge to home or other facility with appropriate resources  Outcome: Progressing     Problem: Skin/Tissue Integrity  Goal: Absence of new skin breakdown  Description: 1. Monitor for areas of redness and/or skin breakdown  2. Assess vascular access sites hourly  3. Every 4-6 hours minimum:  Change oxygen saturation probe site  4. Every 4-6 hours:  If on nasal continuous positive airway pressure, respiratory therapy assess nares and determine need for appliance change or resting period. Outcome: Progressing     Problem: Safety - Adult  Goal: Free from fall injury  Outcome: Progressing     Problem: Confusion  Goal: Confusion, delirium, dementia, or psychosis is improved or at baseline  Description: INTERVENTIONS:  1. Assess for possible contributors to thought disturbance, including medications, impaired vision or hearing, underlying metabolic abnormalities, dehydration, psychiatric diagnoses, and notify attending LIP  2. [place] high risk fall precautions, as indicated  3. Provide frequent short contacts to provide reality reorientation, refocusing and direction  4. Decrease environmental stimuli, including noise as appropriate  5. Monitor and intervene to maintain adequate nutrition, hydration, elimination, sleep and activity  6. If unable to ensure safety without constant attention obtain sitter and review sitter guidelines with assigned personnel  7.  Initiate Psychosocial CNS and Spiritual Care consult, as indicated  Outcome: Progressing     Problem: Neurosensory - Adult  Goal: Achieves stable or improved neurological status  Outcome: Progressing     Problem: Musculoskeletal - Adult  Goal: Return mobility to safest level of function  Outcome: Progressing     Problem: Genitourinary - Adult  Goal: Urinary catheter remains patent  Outcome: Progressing     Problem: Metabolic/Fluid and Electrolytes - Adult  Goal: Electrolytes maintained within normal limits  Outcome:

## 2022-06-13 LAB
ALBUMIN SERPL-MCNC: 2.3 G/DL (ref 3.4–5)
ANION GAP SERPL CALCULATED.3IONS-SCNC: 13 MMOL/L (ref 3–16)
BUN BLDV-MCNC: 32 MG/DL (ref 7–20)
CALCIUM SERPL-MCNC: 9.4 MG/DL (ref 8.3–10.6)
CHLORIDE BLD-SCNC: 99 MMOL/L (ref 99–110)
CO2: 26 MMOL/L (ref 21–32)
CREAT SERPL-MCNC: 5 MG/DL (ref 0.8–1.3)
GFR AFRICAN AMERICAN: 14
GFR NON-AFRICAN AMERICAN: 11
GLUCOSE BLD-MCNC: 112 MG/DL (ref 70–99)
PHOSPHORUS: 3.2 MG/DL (ref 2.5–4.9)
POTASSIUM SERPL-SCNC: 4.3 MMOL/L (ref 3.5–5.1)
SODIUM BLD-SCNC: 138 MMOL/L (ref 136–145)

## 2022-06-13 PROCEDURE — 99233 SBSQ HOSP IP/OBS HIGH 50: CPT | Performed by: PSYCHIATRY & NEUROLOGY

## 2022-06-13 PROCEDURE — 36415 COLL VENOUS BLD VENIPUNCTURE: CPT

## 2022-06-13 PROCEDURE — 1200000000 HC SEMI PRIVATE

## 2022-06-13 PROCEDURE — 6360000002 HC RX W HCPCS: Performed by: INTERNAL MEDICINE

## 2022-06-13 PROCEDURE — 6360000002 HC RX W HCPCS: Performed by: REGISTERED NURSE

## 2022-06-13 PROCEDURE — 6370000000 HC RX 637 (ALT 250 FOR IP): Performed by: INTERNAL MEDICINE

## 2022-06-13 PROCEDURE — C9113 INJ PANTOPRAZOLE SODIUM, VIA: HCPCS | Performed by: INTERNAL MEDICINE

## 2022-06-13 PROCEDURE — C9113 INJ PANTOPRAZOLE SODIUM, VIA: HCPCS | Performed by: REGISTERED NURSE

## 2022-06-13 PROCEDURE — 80069 RENAL FUNCTION PANEL: CPT

## 2022-06-13 RX ORDER — PANTOPRAZOLE SODIUM 40 MG/10ML
40 INJECTION, POWDER, LYOPHILIZED, FOR SOLUTION INTRAVENOUS 2 TIMES DAILY
Status: DISCONTINUED | OUTPATIENT
Start: 2022-06-13 | End: 2022-06-17 | Stop reason: HOSPADM

## 2022-06-13 RX ORDER — PANTOPRAZOLE SODIUM 40 MG/10ML
40 INJECTION, POWDER, LYOPHILIZED, FOR SOLUTION INTRAVENOUS DAILY
Status: DISCONTINUED | OUTPATIENT
Start: 2022-06-13 | End: 2022-06-13

## 2022-06-13 RX ADMIN — CARVEDILOL 3.12 MG: 3.12 TABLET, FILM COATED ORAL at 18:49

## 2022-06-13 RX ADMIN — PANTOPRAZOLE SODIUM 40 MG: 40 INJECTION, POWDER, FOR SOLUTION INTRAVENOUS at 11:53

## 2022-06-13 RX ADMIN — LEVOTHYROXINE SODIUM 137 MCG: 0.03 TABLET ORAL at 06:36

## 2022-06-13 RX ADMIN — PANTOPRAZOLE SODIUM 40 MG: 40 INJECTION, POWDER, FOR SOLUTION INTRAVENOUS at 21:29

## 2022-06-13 RX ADMIN — DOXAZOSIN 1 MG: 2 TABLET ORAL at 09:50

## 2022-06-13 RX ADMIN — CLOPIDOGREL BISULFATE 75 MG: 75 TABLET ORAL at 09:49

## 2022-06-13 RX ADMIN — NEPHROCAP 1 MG: 1 CAP ORAL at 09:50

## 2022-06-13 RX ADMIN — SEVELAMER CARBONATE 800 MG: 800 TABLET, FILM COATED ORAL at 09:55

## 2022-06-13 RX ADMIN — CARVEDILOL 3.12 MG: 3.12 TABLET, FILM COATED ORAL at 09:55

## 2022-06-13 RX ADMIN — CALCIUM 500 MG: 500 TABLET ORAL at 09:49

## 2022-06-13 RX ADMIN — ATORVASTATIN CALCIUM 20 MG: 10 TABLET, FILM COATED ORAL at 09:49

## 2022-06-13 RX ADMIN — ASPIRIN 81 MG: 81 TABLET, COATED ORAL at 09:50

## 2022-06-13 NOTE — PROGRESS NOTES
[facility] Progress Note           Subjective/   66y.o. year old male who we are seeing in consultation for ESKD on HD. HPI:  Last HD on 6/11 , post-weight of 76.7 kg? .  +weak. ROS:  Intake reduced, no shortness of breath. Objective/   GEN:  Chronically ill, /70   Pulse 88   Temp 98 °F (36.7 °C) (Axillary)   Resp 22   Ht 5' 10\" (1.778 m)   Wt 177 lb 7.5 oz (80.5 kg) Comment: sheets and blanket on bed  SpO2 95%   BMI 25.46 kg/m²   HEENT: non-icteric, no JVD  CV: S1, S2 with +amanda, no r/g; no LE edema  RESP: CTA B without w/r/r; breathing wnl  ABD: +bs, soft, nt, no hsm  SKIN: warm, no rashes  ACCESS: R [facility]    Data/  Recent Labs     06/10/22  0835 06/10/22  0835 06/11/22  0647 06/11/22  0647 06/11/22  1244 06/12/22  0216 06/12/22  1740   WBC 16.0*  --  12.3*  --   --  12.0*  --    HGB 7.1*   < > 6.6*   < > 7.7* 7.0* 7.0*   HCT 22.2*   < > 20.9*   < > 24.0* 21.9* 21.5*   MCV 85.1  --  85.2  --   --  85.2  --    *  --  484*  --   --  412  --     < > = values in this interval not displayed. Recent Labs     06/10/22  0835 06/10/22  0835 06/11/22  0647 06/12/22  0216 06/13/22  0657   *   < > 135* 136 138   K 3.5   < > 3.6 3.7  3.7 4.3   CL 97*   < > 97* 99 99   CO2 27   < > 25 27 26   GLUCOSE 142*   < > 113* 127* 112*   PHOS  --   --  3.6 2.5 3.2   MG 1.70*  --   --   --   --    BUN 29*   < > 37* 22* 32*   CREATININE 4.2*   < > 5.4* 3.5* 5.0*   LABGLOM 14*   < > 10* 17* 11*   GFRAA 17*   < > 13* 21* 14*    < > = values in this interval not displayed. Assessment/     - End stage kidney disease - on HD Tues-Thurs-Sat     - Suspicion for embolic source to recurrent CVA's              Punctate areas noted in the left periventricular white matter, right parietal lobe,               right occipital lobe, and left occipital lobe.  Previous right cerebellar infarct.    Vascular surgery consulted     - Left carotid artery stenosis     - Aortic stenosis - moderate to severe     - Hypertension     - DM2     - Anemia - VIRAJ with HD, prn prbc's     - Hypothyroidism    - Malnutrition - started on IDPN at outpatient HD    Plan/     - Next HD Tuesday, under listed outpatient EDW of 83.5 kg, suspect bed weight inaccuracy  - VIRAJ with HD, prn prbc's  - Trend labs, bp's    ____________________________________  Daralene Boas, MD  [facility]  www.[employer]  Office: [phone]

## 2022-06-13 NOTE — PROGRESS NOTES
Arlene Danielle  Neurology Follow-up  [facility]    Date of Service: 6/13/2022    Subjective:   CC: Follow up today regarding: Acute right-sided weakness    Events noted. Chart and lab reviewed. Hospital day 3. No family present. Patient is weak and drowsy. He is waxing and waning. Denies any headache or chest pain. Other review of system was limited. ROS : A 10-12 system review obtained from discussion with patient's family/and or nurse, was unremarkable.     Family history: Noncontributory, reviewed the patient    Past Medical History:   Diagnosis Date    Anemia     Diabetes mellitus ([facility] [address]     ESRD (end stage renal disease) on dialysis ([facility] [address]     Tues-Thurs-Sat    Hyperlipidemia     Hypertension      Current Facility-Administered Medications   Medication Dose Route Frequency Provider Last Rate Last Admin    pantoprazole (PROTONIX) injection 40 mg  40 mg IntraVENous Daily VIVIEN Flanagan - CNP   40 mg at 06/13/22 1153    0.9 % sodium chloride infusion   IntraVENous PRN Agustín Flores MD        b complex-C-folic acid (NEPHROCAPS) capsule 1 mg  1 capsule Oral Daily Raj Dyson MD   1 mg at 06/13/22 0950    [START ON 6/14/2022] epoetin katelynn-epbx (RETACRIT) injection 10,000 Units  10,000 Units IntraVENous Once per day on Tue Thu Sat Raj Dyson MD        atorvastatin (LIPITOR) tablet 20 mg  20 mg Oral Daily Fredy Hill MD   20 mg at 06/13/22 0949    calcium elemental (OSCAL) tablet 500 mg  500 mg Oral Daily Fredy Hill MD   500 mg at 06/13/22 0949    carvedilol (COREG) tablet 3.125 mg  3.125 mg Oral BID WC Fredy Hill MD   3.125 mg at 06/13/22 0955    clopidogrel (PLAVIX) tablet 75 mg  75 mg Oral Daily Fredy Hill MD   75 mg at 06/13/22 0949    doxazosin (CARDURA) tablet 1 mg  1 mg Oral Daily Fredy Hill MD   1 mg at 06/13/22 0950    levothyroxine (SYNTHROID) tablet 137 mcg  137 mcg Oral Daily Fredy Hill MD   137 mcg at 06/13/22 0636     06/13/2022    K 4.3 06/13/2022    K 3.7 06/12/2022    CL 99 06/13/2022    CO2 26 06/13/2022    BUN 32 06/13/2022    CREATININE 5.0 06/13/2022    GFRAA 14 06/13/2022    LABGLOM 11 06/13/2022    GLUCOSE 112 06/13/2022    PHOS 3.2 06/13/2022    MG 1.70 06/10/2022    CALCIUM 9.4 06/13/2022     Lab Results   Component Value Date    WBC 12.0 06/12/2022    RBC 2.56 06/12/2022    HGB 7.0 06/12/2022    HCT 21.5 06/12/2022    MCV 85.2 06/12/2022    RDW 18.2 06/12/2022     06/12/2022     Lab Results   Component Value Date    INR 1.21 (H) 04/04/2018    PROTIME 13.7 (H) 04/04/2018       Neuroimaging and labs were independently reviewed by me  Reviewed notes from different physicians. Impression:    Acute, possibly embolic, bilateral CVAs - punctate areas noted in the left periventricular white matter, right parietal lobe, right occipital lobe, and left occipital lobe. Previous right cerebellar infarct noted. Left carotid stenosis, 70%  ESRD on HD  Anemia  Hypertension  Hyperlipidemia  Diabetes type 2, controlled. A1c 5.9. Aortic stenosis  Hypothyroidism    Recommendation    Monitor on tele. Continue home Plavix. Continue home BP meds. SSI coverage while inpatient. PT/OT/SLP  HD per nephrology. Vascular consulted for left ICA stenosis, although given bilateral strokes, suspicious for embolic source such as A. Fib. He is a poor surgical candidate at this time. GI following for anemia - no evidence of overt bleeding.      Needs 30 day event monitor to rule out PAF.       Recommend goals of care discussion with family. I will call his wife later today. Stanislaw Kelly, LIZA    Attending Supervising Physician's Attestation Statement   I reviewed and agree with the findings and plan as documented in NP  note   Patient is currently awake and alert. He can follow direction but not a good historian. Looks older than stated age  Generalized diffuse weakness more right than left.   Diminished DTRs distally  No gaze preference  Soft speech but fluent  Poor attention and concentration fund of knowledge    Impression  Acute ischemic strokes affecting different vessel distributions  Acute metabolic encephalopathy, 33  Left ICA stenosis  Dysphagia  Diabetes  Generalized condition and debilitation    Recommendation:  PT, OT and speech  GI work-up for anemia  Continue Plavix  Statin  Blood sugar control  Insulin sliding scale  Continue current blood pressure medications  Stroke education and prevention was discussed  Agree with vascular re left ICA stenosis. MDM: High    Electronically signed by Barbara Ellison MD on 6/13/22 at 3:53 PM EDT      This dictation was generated by voice recognition computer software. Although all attempts are made to edit the dictation for accuracy, there may be errors in the transcription that are not intended.

## 2022-06-13 NOTE — CARE COORDINATION
Pt case discussed during huddle and plan remains for the Pt to return to [facility] at d/c to complete SNF stay. Call placed to [facility] with [facility] to advise to start pre cert as the Pt may be ready in next 1-2 days. [facility] states that he will start pre cert this evening, will likely get it back tomorrow and then it should be good for 48 hours. Will follow.

## 2022-06-13 NOTE — CONSULTS
Consultation Note    Patient Name: Ronald Antonio  : 1950  Age: 66 y.o. Admitting Physician: Jaime Randhawa MD   Date of Admission: 6/10/2022  8:06 AM   Primary Care Physician: Leida Linton MD        Ronald Antonio is being seen at the request of Jaime Randhawa MD for anemia-ESRD, hgb trending down even with PRBC. History of Present Illness:  66year old M with PMH significant for diabetes mellitus, ESRD on HD, hyperlipidemia, and HTN. No prior abdominal surgeries noted. Presented to the ER on 6/10 with new right sided weakness, thought to be related to recurrent acute CVA. He had a previous admission in May for acute CVA. He has been on aspirin and Plavix since that time. GI has been consulted for anemia. Hemoglobin 6.6 down from 7.1 the day prior. Baseline hemoglobin appears to be 7-8. No overt GI bleeding has been reported. No prior history of EGD or colonoscopy. Patient is lethargic and continues to have right-sided weakness. Family at the bedside. [de-identified] of history was obtained from family and nursing. No reports of abdominal pain or nausea/vomiting. Hemodynamically stable. Continues on aspirin and Plavix due to recent strokes. Family reports that he has had ongoing issues with lethargy even before his stroke. Known anemia of chronic disease. Wife reports that patient has been receiving iron supplement with dialysis on Tuesday, Thursday, and Saturday. GI History:  None noted. Past Medical History:  Past Medical History:   Diagnosis Date    Anemia     [facility] mellitus ([facility] [address]     ESRD (end stage renal disease) on dialysis ([facility] [address]     Tues-Thurs-Sat    Hyperlipidemia     Hypertension         Past Surgical History:  History reviewed. No pertinent surgical history. Historical Medications:  Prior to Visit Medications    Medication Sig Taking?  Authorizing Provider   epoetin katelynn (EPOGEN;PROCRIT) 4000 UNIT/ML injection Inject 8,000 Units into the skin three times a week T, Th, S with dialysis. Yes Historical Provider, MD   doxazosin (CARDURA) 1 MG tablet Take 1 tablet by mouth daily  Thurnell Saint, MD   clopidogrel (PLAVIX) 75 MG tablet Take 1 tablet by mouth daily  Thurnell Saint, MD   megestrol (MEGACE) 40 MG/ML suspension Take 15.6 mLs by mouth daily  Thurnell Saint, MD   sevelamer (RENVELA) 800 MG tablet Take 1 tablet by mouth 3 times daily (with meals)  Historical Provider, MD   carvedilol (COREG) 3.125 MG tablet Take 1 tablet by mouth 2 times daily (with meals)  Joaquin Guy MD   atorvastatin (LIPITOR) 20 MG tablet Take 1 tablet by mouth daily  Joaquin Guy MD   nitroGLYCERIN (NITROSTAT) 0.4 MG SL tablet up to max of 3 total doses. If no relief after 1 dose, call 911.   Patient not taking: Reported on 6/10/2022  Joaquin Guy MD   calcium elemental (OSCAL) 500 MG TABS tablet Take 1 tablet by mouth daily  Joan Colon MD   levothyroxine (SYNTHROID) 125 MCG tablet Take 137 mcg by mouth Daily   Historical Provider, MD        Hospital Medications:  Current Facility-Administered Medications: 0.9 % sodium chloride infusion, , IntraVENous, PRN  b complex-C-folic acid (NEPHROCAPS) capsule 1 mg, 1 capsule, Oral, Daily  [START ON 6/14/2022] epoetin katelynn-epbx (RETACRIT) injection 10,000 Units, 10,000 Units, IntraVENous, Once per day on Tue Thu Sat  atorvastatin (LIPITOR) tablet 20 mg, 20 mg, Oral, Daily  calcium elemental (OSCAL) tablet 500 mg, 500 mg, Oral, Daily  carvedilol (COREG) tablet 3.125 mg, 3.125 mg, Oral, BID WC  clopidogrel (PLAVIX) tablet 75 mg, 75 mg, Oral, Daily  doxazosin (CARDURA) tablet 1 mg, 1 mg, Oral, Daily  levothyroxine (SYNTHROID) tablet 137 mcg, 137 mcg, Oral, Daily  sevelamer (RENVELA) tablet 800 mg, 800 mg, Oral, TID WC  ondansetron (ZOFRAN-ODT) disintegrating tablet 4 mg, 4 mg, Oral, Q8H PRN **OR** ondansetron (ZOFRAN) injection 4 mg, 4 mg, IntraVENous, Q6H PRN  polyethylene glycol (GLYCOLAX) packet 17 g, 17 g, Oral, Daily PRN  aspirin EC tablet 81 mg, 81 mg, Oral, Daily **OR** aspirin suppository 300 mg, 300 mg, Rectal, Daily     Social History:   Social History     Tobacco History     Smoking Status  Former Smoker Quit date  2/28/2018 Smoking Frequency  0.5 packs/day Smoking Tobacco Type  Cigarettes    Smokeless Tobacco Use  Never Used          Alcohol History     Alcohol Use Status  No Comment  last drink 2/28 \"Pt states he hasn't drank in four weeks\"          Drug Use     Drug Use Status  No          Sexual Activity     Sexually Active  Not Currently                 Family History:  Family History   Family history unknown: Yes        Allergies:  No Known Allergies     ROS:   General: No fever or weight change  Hematologic: No unexpected submucosal bleeding or bruising  HEENT: No sore throat or facial pain  Respiratory: No cough or dyspnea  Cardiovascular: No angina or dependent edema  Gastrointestinal: See HPI  Musculoskeletal: No usual joint pain or stiffness  Skin: No skin eruptions or changing lesions  Neurologic: No focal weakness or numbness  Psychiatric: No anxiety or sleep disturbance    Physical Exam:  Vital Signs:   Vitals:    06/13/22 0319   BP: 136/70   Pulse: 88   Resp:    Temp: 98 °F (36.7 °C)   SpO2: 95%       General: Lethargic. Appears chronically ill. No acute distress. HEENT: Sclera anicteric, mucosal membranes moist  Cardiovascular: Regular rate and rhythm. No murmurs. Respiratory: Respirations nonlabored, no crepitus  GI: Abdomen nondistended, soft, and nontender. Normal active bowel sounds. No masses palpable. Rectal: Deferred  Musculoskeletal: No pitting edema of the lower legs. Neurological: Gross memory appears intact. Right-sided weakness. Recent labs and imaging reviewed. Assessment:    66year old M with PMH significant for diabetes mellitus, ESRD on HD, hyperlipidemia, and HTN. Admitted with right-sided weakness and acute CVA. Patient recently admitted in May with acute CVA.   Since

## 2022-06-13 NOTE — PROGRESS NOTES
Hospitalist Progress Note      PCP: Jessica Muse MD    Date of Admission: 6/10/2022    Chief Complaint: Right sided weakness     Hospital Course:  h and p reviewed     Subjective: resting in bed, family at bedside, able to move rt arm/leg       Medications:  Reviewed    Infusion Medications    sodium chloride       Scheduled Medications    b complex-C-folic acid  1 capsule Oral Daily    [START ON 6/14/2022] epoetin katelynn-epbx  10,000 Units IntraVENous Once per day on Tue Thu Sat    atorvastatin  20 mg Oral Daily    calcium elemental  500 mg Oral Daily    carvedilol  3.125 mg Oral BID WC    clopidogrel  75 mg Oral Daily    doxazosin  1 mg Oral Daily    levothyroxine  137 mcg Oral Daily    sevelamer  800 mg Oral TID WC    aspirin  81 mg Oral Daily    Or    aspirin  300 mg Rectal Daily     PRN Meds: sodium chloride, ondansetron **OR** ondansetron, polyethylene glycol      Intake/Output Summary (Last 24 hours) at 6/13/2022 0927  Last data filed at 6/12/2022 1542  Gross per 24 hour   Intake 200 ml   Output --   Net 200 ml       Physical Exam Performed:    BP (!) 149/76   Pulse 93   Temp 98.1 °F (36.7 °C) (Oral)   Resp 18   Ht 5' 10\" (1.778 m)   Wt 177 lb 7.5 oz (80.5 kg) Comment: sheets and blanket on bed  SpO2 93%   BMI 25.46 kg/m²     General appearance: No apparent distress, appears stated age and c. Frail   HEENT: . Conjunctivae/corneas clear. Neck: Supple, with full range of motion. No jugular venous distention. Trachea midline. Respiratory:  Normal respiratory effort. Reduced AE , bilaterally without Rales/Wheezes/Rhonchi. Chest: tunneled vas cath noted  Cardiovascular: Regular rate and rhythm with normal S1/S2 without murmurs, rubs or gallops. Abdomen: Soft, non-tender, non-distended with normal bowel sounds. Musculoskeletal: No clubbing, cyanosis or edema bilaterally. Skin: Skin color, texture, turgor normal.  No rashes or lesions.   Neurologic:   Move all 4 extremities , no facial weakness    Psychiatric: Alert and oriented x 2 ,       Labs:   Recent Labs     06/11/22  0647 06/11/22  0647 06/11/22  1244 06/12/22  0216 06/12/22  1740   WBC 12.3*  --   --  12.0*  --    HGB 6.6*   < > 7.7* 7.0* 7.0*   HCT 20.9*   < > 24.0* 21.9* 21.5*   *  --   --  412  --     < > = values in this interval not displayed. Recent Labs     06/11/22  0647 06/12/22  0216 06/13/22  0657   * 136 138   K 3.6 3.7  3.7 4.3   CL 97* 99 99   CO2 25 27 26   BUN 37* 22* 32*   CREATININE 5.4* 3.5* 5.0*   CALCIUM 9.1 8.3 9.4   PHOS 3.6 2.5 3.2     No results for input(s): AST, ALT, BILIDIR, BILITOT, ALKPHOS in the last 72 hours. No results for input(s): INR in the last 72 hours. Recent Labs     06/10/22  1341 06/10/22  1710   TROPONINI 0.79* 0.79*       Urinalysis:      Lab Results   Component Value Date    NITRU Negative 05/19/2022    WBCUA 3-5 05/19/2022    BACTERIA Rare 05/25/2018    RBCUA 3-4 05/19/2022    BLOODU SMALL 05/19/2022    SPECGRAV 1.010 05/19/2022    GLUCOSEU Negative 05/19/2022       Radiology:  MRI BRAIN WO CONTRAST   Final Result   There are approximately 4 punctate areas of restricted diffusion consistent   with acute areas of infarct. There is a punctate area of restricted   diffusion in the left periventricular white matter, the right parietal lobe,   the right occipital lobe, and the left occipital lobe. An embolic source   should be considered. Cerebral atrophy. Moderate chronic small vessel ischemic changes. Remote   infarct in the right cerebellum. XR CHEST PORTABLE   Final Result   Mild vascular congestion is stable. CTA HEAD NECK W CONTRAST   Final Result   1. Severe (greater than 70%) stenosis of the proximal left ICA. 2. The right vertebral artery V4 segment after the PICA takeoff is not well   visualized and may be occluded or congenitally diminutive.    3. The basilar artery appears diminutive, may in part be congenital given   presence of bilateral posterior communicating arteries. 4. The remaining major intracranial arteries appear patent without   significant stenosis. 5. Multilevel cervical spondylosis with associated severe neural foraminal   narrowing as well as C5-C6 spinal canal stenosis. 6. Scattered periapical lucency such as around the left lower central incisor   and right lower 1st molar. Can correlate with dental examination. CT HEAD WO CONTRAST   Final Result   No acute intracranial abnormality. The findings were sent to the Radiology Results [address] at 8:43   am on 6/10/2022 to be communicated to a licensed caregiver. Assessment/Plan:    Active Hospital Problems    Diagnosis     Moderate malnutrition ([facility] [address] [E44.0]      Priority: Medium    ESRD (end stage renal disease) ([facility] [address] [N18.6]      Priority: Medium    Acute CVA (cerebrovascular accident) ([facility] [address] [I63.9]      Priority: Medium    Left carotid stenosis [I65.22]      Priority: Medium    Severe aortic stenosis [I35.0]      Priority: Medium    Elevated troponin [R77.8]      Priority: Medium    NSTEMI (non-ST elevated myocardial infarction) ([facility]) [I21.4]     Hypothyroidism [E03.9]     Essential hypertension [I10]     Type 2 diabetes mellitus with renal complication ([facility]) [R57.11]          Possible recurrent Acute CVA: Right sided weakness is new compared to prior CVA where he had right/posterior CVA. CTA noted. Concern that left ICA may be contributing. Repeat Brain MRI possible right and left occipital embolic CVA,. Neuro evaluation . appreciated  PT/OT/SLP. Tele. ECHO done recently.   -Vascular surg consulted, rec med mgmt(not good surgical candidate), did not feel at this time Left ICA stenosis is source of cva, but will need to be addressed in future pending recovery  -Will need Event monitor at dc      Elevated troponin: Similar to prior, elevation is likely demand ischemia in setting of ESRD, severe AS. CVA could be contributing.  No

## 2022-06-13 NOTE — PROGRESS NOTES
TODAYS DATE:  6/13/2022     Discussed personal risk factors for Stroke /TIA with patient/family, and ways to reduce the risk for a recurrent stroke. Patient's personal risk factors which were identified are:      []? Alcohol Abuse: check with your physician before any alcohol consumption. []? Atrial fibrillation: may cause blood clots. []? Drug Abuse: Seek help, talk with your doctor  []? Clotting Disorder  [x]? Diabetes  [x]? Family history of stroke or heart disease  [x]? High Blood Pressure/Hypertension: work with your physician.  []? High cholesterol: monitor cholesterol levels with your physician.   []? Overweight/Obesity: work with your physician for your ideal body weight. [x]? Physical Inactivity: get regular exercise as directed by your physician. [x]? Personal history of previous TIA or stroke  []? Poor Diet; decrease salt (sodium) in your diet, follow diet directed by physician.   []? Smoking: Cigarette/Cigar: stop smoking.        Reviewed the Following Education with Patient and/or Family:   -Signs and Symptoms of Stroke:     (facial droop, weakness/numbness especially on one side, speech difficulty, sudden confusion, sudden loss of vision, sudden severe headache,       sudden loss of balance or having difficulty walking, syncope or seizure)  -Importance of Follow Up Appointment at Discharge   -Importance of Compliance with Medications Prescribed at Discharge      Pt with poor attention throughout education, needs reinforcement.      Family Present during Education: No  Stroke Education Booklet at Bedside: Yes     Terrence Damon RN  6/13/2022

## 2022-06-14 LAB
ALBUMIN SERPL-MCNC: 2.2 G/DL (ref 3.4–5)
ANION GAP SERPL CALCULATED.3IONS-SCNC: 20 MMOL/L (ref 3–16)
BUN BLDV-MCNC: 49 MG/DL (ref 7–20)
CALCIUM SERPL-MCNC: 9.5 MG/DL (ref 8.3–10.6)
CHLORIDE BLD-SCNC: 97 MMOL/L (ref 99–110)
CO2: 20 MMOL/L (ref 21–32)
CREAT SERPL-MCNC: 6.4 MG/DL (ref 0.8–1.3)
GFR AFRICAN AMERICAN: 10
GFR NON-AFRICAN AMERICAN: 9
GLUCOSE BLD-MCNC: 127 MG/DL (ref 70–99)
HCT VFR BLD CALC: 25.1 % (ref 40.5–52.5)
HEMOGLOBIN: 7.4 G/DL (ref 13.5–17.5)
MCH RBC QN AUTO: 26.9 PG (ref 26–34)
MCHC RBC AUTO-ENTMCNC: 29.6 G/DL (ref 31–36)
MCV RBC AUTO: 90.7 FL (ref 80–100)
PDW BLD-RTO: 18.8 % (ref 12.4–15.4)
PHOSPHORUS: 4.4 MG/DL (ref 2.5–4.9)
PLATELET # BLD: 448 K/UL (ref 135–450)
PMV BLD AUTO: 7.4 FL (ref 5–10.5)
POTASSIUM SERPL-SCNC: 4.7 MMOL/L (ref 3.5–5.1)
RBC # BLD: 2.76 M/UL (ref 4.2–5.9)
SODIUM BLD-SCNC: 137 MMOL/L (ref 136–145)
WBC # BLD: 17.4 K/UL (ref 4–11)

## 2022-06-14 PROCEDURE — 6360000002 HC RX W HCPCS: Performed by: INTERNAL MEDICINE

## 2022-06-14 PROCEDURE — 97112 NEUROMUSCULAR REEDUCATION: CPT

## 2022-06-14 PROCEDURE — 6370000000 HC RX 637 (ALT 250 FOR IP): Performed by: INTERNAL MEDICINE

## 2022-06-14 PROCEDURE — 85027 COMPLETE CBC AUTOMATED: CPT

## 2022-06-14 PROCEDURE — 80069 RENAL FUNCTION PANEL: CPT

## 2022-06-14 PROCEDURE — 97530 THERAPEUTIC ACTIVITIES: CPT

## 2022-06-14 PROCEDURE — 90935 HEMODIALYSIS ONE EVALUATION: CPT

## 2022-06-14 PROCEDURE — 1200000000 HC SEMI PRIVATE

## 2022-06-14 PROCEDURE — C9113 INJ PANTOPRAZOLE SODIUM, VIA: HCPCS | Performed by: INTERNAL MEDICINE

## 2022-06-14 PROCEDURE — 36415 COLL VENOUS BLD VENIPUNCTURE: CPT

## 2022-06-14 RX ORDER — HEPARIN SODIUM 1000 [USP'U]/ML
3500 INJECTION, SOLUTION INTRAVENOUS; SUBCUTANEOUS PRN
Status: DISCONTINUED | OUTPATIENT
Start: 2022-06-14 | End: 2022-06-17

## 2022-06-14 RX ADMIN — PANTOPRAZOLE SODIUM 40 MG: 40 INJECTION, POWDER, FOR SOLUTION INTRAVENOUS at 20:39

## 2022-06-14 RX ADMIN — DOXAZOSIN 1 MG: 2 TABLET ORAL at 12:19

## 2022-06-14 RX ADMIN — CALCIUM 500 MG: 500 TABLET ORAL at 12:19

## 2022-06-14 RX ADMIN — ATORVASTATIN CALCIUM 20 MG: 10 TABLET, FILM COATED ORAL at 12:30

## 2022-06-14 RX ADMIN — NEPHROCAP 1 MG: 1 CAP ORAL at 12:19

## 2022-06-14 RX ADMIN — HEPARIN SODIUM 3500 UNITS: 1000 INJECTION INTRAVENOUS; SUBCUTANEOUS at 11:29

## 2022-06-14 RX ADMIN — EPOETIN ALFA-EPBX 10000 UNITS: 10000 INJECTION, SOLUTION INTRAVENOUS; SUBCUTANEOUS at 09:57

## 2022-06-14 RX ADMIN — LEVOTHYROXINE SODIUM 137 MCG: 0.03 TABLET ORAL at 06:34

## 2022-06-14 RX ADMIN — CLOPIDOGREL BISULFATE 75 MG: 75 TABLET ORAL at 12:19

## 2022-06-14 RX ADMIN — PANTOPRAZOLE SODIUM 40 MG: 40 INJECTION, POWDER, FOR SOLUTION INTRAVENOUS at 12:18

## 2022-06-14 NOTE — PROGRESS NOTES
Occupational Therapy  Facility/Department: [facility] B3 - MED SURG  Daily Treatment Note  NAME: Lalo Brandt  : 1950  MRN: [id number]    Date of Service: 2022    Discharge Recommendations:  [address]         Patient Diagnosis(es): The primary encounter diagnosis was Altered mental status, unspecified altered mental status type. Diagnoses of Right-sided sensory deficit present, Right sided weakness, and Confusion were also pertinent to this visit. Assessment    Assessment: Pt with fair tolerance of OT treatment. Pt requires VCs for initation and encouragement for participation throughout. Pt demos cognitive deficits and disorientation limiting pt ability to verbalize understanding of situation and need for therapy participation. Pt required max Ax2 to min+max Ax2 for bed mobility. Pt sat EOB with a posterior lean requiring max-total A to remain in an upright posture. Pt unsafe to attempt OOB mobility. Pt declining all ADLS but agreeable to rolling in bed for positioning. Pt would benefit from continued skilled OT in SNF setting at d/c. Activity Tolerance: Patient limited by pain; Patient limited by endurance;Treatment limited secondary to agitation;Patient limited by fatigue    Discharge Recommendations: [address]  Times per Week: 2-3 x/week  Current Treatment Recommendations: Balance training; Safety education & training;Patient/Caregiver education & training;ROM;Neuromuscular re-education;Self-Care / ADL     Restrictions  Restrictions/Precautions  Restrictions/Precautions: Fall Risk;General Precautions  Position Activity Restriction  Other position/activity restrictions: Telemetry, R chest port, IV, HD    Subjective   Subjective  Subjective: Pt resting in bed at approach, agreeable to OT treatment with encouragement. Pain: Pt denies pain at rest. Pt reporting increased back pain with mobility.   Orientation  Overall Orientation Status: Impaired  Orientation Level: Disoriented to place; Disoriented to situation;Disoriented to time;Disoriented to person (Pt gave wrong birthday repeatedly)  Pain: Complaint of back pain with activity. Cognition  Overall Cognitive Status: Exceptions  Arousal/Alertness: Inconsistent responses to stimuli  Following Commands: Inconsistently follows commands  Attention Span: Difficulty dividing attention; Difficulty attending to directions; Unable to maintain attention  Memory: Decreased short term memory;Decreased recall of recent events;Decreased long term memory;Decreased recall of precautions;Decreased recall of biographical Information  Safety Judgement: Decreased awareness of need for safety;Decreased awareness of need for assistance  Insights: Not aware of deficits  Initiation: Requires cues for all  Sequencing: Requires cues for all        Objective    Vitals: /60, HR 91, O2 97%     Bed Mobility Training  Bed Mobility Training: Yes  Overall Level of Assistance: Maximum assistance;Assist X2;Additional time  Interventions: Verbal cues; Tactile cues; Safety awareness training  Rolling: Maximum assistance; Adaptive equipment; Additional time (VC for use of bed rail)  Supine to Sit: Maximum assistance;Assist X2;Adaptive equipment; Additional time (VC for initation of movement; assist at BLE and trunk)  Sit to Supine: Maximum assistance;Minimum assistance;Assist X2;Additional time; Adaptive equipment (Max(A)x1 at trunk + min(A)x1 at RLE)  Scooting: Assist X2;Maximum assistance    Balance  Sitting: Impaired  Sitting - Static: Poor (constant support) (Strong posterior lean; pt tolerates sitting EOB x2-3 minutes)  Sitting - Dynamic: Poor (constant support)  Transfer Training  Transfer Training: No     ADL  Additional Comments: Pt declining all ADLs this date. OT Exercises  Static Sitting Balance Exercises: Seated EOB 2-3minutes. Pt encouraged to use BR's for stability d/t increased posterior lean.  Pt unable to correct

## 2022-06-14 NOTE — PROGRESS NOTES
Physical Therapy/Occupational Therapy    Attempted to see pt for follow up PT/OT session. Pt currently off floor for dialysis. Will continue to follow as schedule allows.      Elena Maharaj, PT, DPT  [facility] OTR/L

## 2022-06-14 NOTE — PROGRESS NOTES
[facility] Progress Note           Subjective/   66y.o. year old male who we are seeing in consultation for ESKD on HD. HPI:  HD TTS      ROS:  Seen during HD today  Tolerating the procedure well    SOC: no visitors    Objective/   GEN:  Chronically ill, BP (!) 154/71   Pulse 92   Temp 98.4 °F (36.9 °C) (Axillary)   Resp 18   Ht 5' 10\" (1.778 m)   Wt 177 lb 7.5 oz (80.5 kg) Comment: sheets and blanket on bed  SpO2 94%   BMI 25.46 kg/m²   HEENT: non-icteric, no JVD  CV: S1, S2 with +amanda, no r/g; no LE edema  RESP: CTA B without w/r/r; breathing wnl  ABD: +bs, soft, nt, no hsm  SKIN: warm, no rashes  ACCESS: R [facility]    Data/  Recent Labs     06/12/22  0216 06/12/22  1740 06/14/22  0719   WBC 12.0*  --  17.4*   HGB 7.0* 7.0* 7.4*   HCT 21.9* 21.5* 25.1*   MCV 85.2  --  90.7     --  448     Recent Labs     06/12/22  0216 06/13/22  0657    138   K 3.7  3.7 4.3   CL 99 99   CO2 27 26   GLUCOSE 127* 112*   PHOS 2.5 3.2   BUN 22* 32*   CREATININE 3.5* 5.0*   LABGLOM 17* 11*   GFRAA 21* 14*       Assessment/     - End stage kidney disease - on HD Tues-Thurs-Sat     - Suspicion for embolic source to recurrent CVA's              Punctate areas noted in the left periventricular white matter, right parietal lobe,               right occipital lobe, and left occipital lobe.  Previous right cerebellar infarct. Vascular surgery consulted     - Left carotid artery stenosis     - Aortic stenosis - moderate to severe     - Hypertension     - DM2     - Anemia - VIRAJ with HD, prn prbc's     - Hypothyroidism    - Malnutrition - started on IDPN at outpatient HD    Plan/     - seen during  HD today, under listed outpatient EDW of 83.5 kg, suspect bed weight inaccuracy  - VIRAJ with HD, prn prbc's  - Trend labs, bp's    ____________________________________  Clara Armas MD  [facility]  [email or web address]  Office: [phone]

## 2022-06-14 NOTE — PROGRESS NOTES
Hospitalist Progress Note      PCP: Carlton Sanches MD    Date of Admission: 6/10/2022    Chief Complaint: Right sided weakness     Hospital Course:   66 y.o. male who presented to [facility] with complains of right sided weakness. PMHx significant for DM2, ESRD, HTN, recent admission for CVA. Prior work-up included right/posterior CVA by MRI and some left carotid stenosis (not the symptomatic side). Cardiac work-up revealed severe AS and he was referred for TAVR. Discharged to [facility]. He returns after staff found him kneeling on the ground. He apparently had some new weakness on the right side compared to prior. LKW was the evening prior. He was a poor historian on presentation. Stroke team was contacted. CTA showed 70% left ICA stenosis, no acute findings. He was not a candidate for any TPA or intervention. Subjective:   Pt seen during dialysis. On RA. Afebrile. VSS. No complaints currently. Appears very tired. Medications:  Reviewed    Infusion Medications    sodium chloride       Scheduled Medications    pantoprazole  40 mg IntraVENous BID    b complex-C-folic acid  1 capsule Oral Daily    epoetin katelynn-epbx  10,000 Units IntraVENous Once per day on Tue Thu Sat    atorvastatin  20 mg Oral Daily    calcium elemental  500 mg Oral Daily    carvedilol  3.125 mg Oral BID WC    clopidogrel  75 mg Oral Daily    doxazosin  1 mg Oral Daily    levothyroxine  137 mcg Oral Daily    sevelamer  800 mg Oral TID WC     PRN Meds: sodium chloride, ondansetron **OR** ondansetron, polyethylene glycol    No intake or output data in the 24 hours ending 06/14/22 0926    Physical Exam Performed:    /68   Pulse 91   Temp 98.5 °F (36.9 °C)   Resp 18   Ht 5' 10\" (1.778 m)   Wt 164 lb 10.9 oz (74.7 kg)   SpO2 95%   BMI 23.63 kg/m²     General appearance: No apparent distress, appears stated age and cooperative. HEENT: Pupils equal, round, and reactive to light. Conjunctivae/corneas clear.   Neck: Supple, with full range of motion. No jugular venous distention. Trachea midline. Respiratory:  Normal respiratory effort. Clear anterior breath sounds. Cardiovascular: Regular rate and rhythm with normal S1/S2 without murmurs, rubs or gallops. Abdomen: Soft, non-tender, non-distended with normal bowel sounds. Musculoskeletal: No clubbing, cyanosis or edema bilaterally. Skin: Skin color, texture, turgor normal.  No rashes or lesions. Neurologic:  Right facial droop, generalized weakness. Psychiatric: Alert and oriented  Capillary Refill: Brisk,3 seconds, normal   Peripheral Pulses: +2 palpable, equal bilaterally       Labs:   Recent Labs     06/12/22  0216 06/12/22  1740 06/14/22  0719   WBC 12.0*  --  17.4*   HGB 7.0* 7.0* 7.4*   HCT 21.9* 21.5* 25.1*     --  448     Recent Labs     06/12/22  0216 06/13/22  0657 06/14/22  0719    138 137   K 3.7  3.7 4.3 4.7   CL 99 99 97*   CO2 27 26 20*   BUN 22* 32* 49*   CREATININE 3.5* 5.0* 6.4*   CALCIUM 8.3 9.4 9.5   PHOS 2.5 3.2 4.4     Urinalysis:      Lab Results   Component Value Date    NITRU Negative 05/19/2022    WBCUA 3-5 05/19/2022    BACTERIA Rare 05/25/2018    RBCUA 3-4 05/19/2022    BLOODU SMALL 05/19/2022    SPECGRAV 1.010 05/19/2022    GLUCOSEU Negative 05/19/2022       Radiology:  MRI BRAIN WO CONTRAST   Final Result   There are approximately 4 punctate areas of restricted diffusion consistent   with acute areas of infarct. There is a punctate area of restricted   diffusion in the left periventricular white matter, the right parietal lobe,   the right occipital lobe, and the left occipital lobe. An embolic source   should be considered. Cerebral atrophy. Moderate chronic small vessel ischemic changes. Remote   infarct in the right cerebellum. XR CHEST PORTABLE   Final Result   Mild vascular congestion is stable. CTA HEAD NECK W CONTRAST   Final Result   1.   Severe (greater than 70%) stenosis of the proximal left ICA.   2. The right vertebral artery V4 segment after the PICA takeoff is not well   visualized and may be occluded or congenitally diminutive. 3. The basilar artery appears diminutive, may in part be congenital given   presence of bilateral posterior communicating arteries. 4. The remaining major intracranial arteries appear patent without   significant stenosis. 5. Multilevel cervical spondylosis with associated severe neural foraminal   narrowing as well as C5-C6 spinal canal stenosis. 6. Scattered periapical lucency such as around the left lower central incisor   and right lower 1st molar. Can correlate with dental examination. CT HEAD WO CONTRAST   Final Result   No acute intracranial abnormality. The findings were sent to the Radiology Results [address] at 8:43   am on 6/10/2022 to be communicated to a licensed caregiver. Assessment/Plan:    Active Hospital Problems    Diagnosis     Altered mental status [R41.82]      Priority: Medium    Moderate malnutrition ([address] [E44.0]      Priority: Medium    ESRD (end stage renal disease) ([facility] [N18.6]      Priority: Medium    Acute CVA (cerebrovascular accident) ([address] [I63.9]      Priority: Medium    Left carotid stenosis [I65.22]      Priority: Medium    Severe aortic stenosis [I35.0]      Priority: Medium    Elevated troponin [R77.8]      Priority: Medium    NSTEMI (non-ST elevated myocardial infarction) ([address] [I21.4]     Hypothyroidism [E03.9]     Essential hypertension [I10]     Type 2 diabetes mellitus with renal complication (HCC) [J88.61]        Acute bilateral CVAs:  - CT head in the ED showed no acute intracranial abnormality.   - CTA head and neck showed severe > 70% stenosis of the left ICA.    - MRI brain showed 4 punctate areas of restricted diffusion consistent with acute areas of infarct -- left periventricular white matter, right parietal lobe, right occipital lobe and left occipital lobe -- embolic source should be considered. Cereal atrophy, moderate chronic small vessel ischemic changes, remote infarct in the right cerebellum.   - ECHO 5/19/22 showed normal LVEF of 55-60%, no RWMAs, G1DD with normal filling pressure, mod-severe AS.  - Monitor pt on telemetry.   - Neurology consulted/following. Will need cardiac event monitor at discharge to evaluate for atrial fibrillation.   - PT/OT/SLP evaluations. Recs for SNF.   - Continue plavix and statin. Left ICA stenosis:  - Vascular surgery consulted. Recommend medical management, not a good surgical candidate. Does not feel left ICA stenosis is source of CVA but will need to be addressed in future pending recovery.      Elevated troponin:   - Similar to prior, elevation is likely demand ischemia in setting of ESRD, severe AS. CVA could be contributing. No CP/SOB. Had has recent work-up including Stess and ECHO.      ESRD on hemodialysis T/Th/S:  - Nephrology consulted/following.   - On nephrocaps, elemental calcium, sevelamer and retacrit.      Anemia, acute on chronic:  - Hgb dipped to 6.6 on 6/11. S/p 1 unit PRBC.   - No overt signs of bleeding. FOBT was negative in the past.   - Suspect due to ESRD. Pt is on VIRAJ per Nephro. - GI consulted/following. No scope planned as of now unless overt signs of bleeding are observed. - Continue PPI BID.      Severe aortic stenosis:   - Plan for outpatient TAVR evaluation.   - Family wished to f/u with [address] [place] [address] cardiology, appointment scheduled already.     Diabetes mellitus type 2:  - Controlled, A1C 5.9.  - On no meds, diet controlled.      Hypertension, benign:   - Controlled. Continue current medication regimen, monitor and adjust as needed.      Hypothyroidism:   - TSH 96.09 with normal FT4 on 5/21/22.  - Continue levothyroxine.   - Recommend recheck of his TSH in 4 weeks. Leukocytosis:  - Etiology unclear. Persistently elevated since May '22. Pt is afebrile. Procal not helpful given ESRD.  No overt signs of infection. Repeat CBC in am.       DVT Prophylaxis: SCDs  Diet: ADULT DIET;  Regular  ADULT ORAL NUTRITION SUPPLEMENT; Breakfast, Dinner; Renal Oral Supplement  Code Status: Full Code    PT/OT Eval Status: ordered with recs for SNF    Dispo - ~2 days pending clinical course, when okay with specialists (Neuro, GI, Nephro)    [address] [place] [address], APRN - CNP

## 2022-06-14 NOTE — FLOWSHEET NOTE
06/14/22 0825 06/14/22 1130   Vital Signs   /68 133/66   Temp 98.5 °F (36.9 °C) 98.6 °F (37 °C)   Heart Rate 91 97   Resp 20 20     Treatment time: 3 hours    Net UF: 1 L    Pre weight: 74.7 kg (bed scale)  Post weight: 73.7 kg (bed scale)  EDW: TBD    Access used: RIJ HD Tunneled cath  Access function:  No problems    Medications or blood products given: Retacrit 57824 units IVP    Regular outpatient schedule: Gigi STILES    Summary of response to treatment: Tolerated 3 hour HD tx without difficulty. Crit line: H1=24.5, H2=24.4, difference of .1. No refill. Ending profile A. Copy of dialysis treatment record placed in chart, to be scanned into EMR. Report called to Aliya Mcneill RN.

## 2022-06-14 NOTE — PROGRESS NOTES
Gastroenterology Progress Note      Patient Cathleen Queen  MRN: [id number]  YOB: 1950 Age: 66 y.o. Sex: male  Room: [address] [place] [address]       Admitting Physician: No admitting provider for patient encounter. Date of Admission: 6/10/2022  8:06 AM   Primary Care Physician: Tati Andrade MD     Subjective:   Cathleen Queen was seen and examined. We are following for acute on chronic anemia with hemoglobin down trended to 6.6 g/dL from 7.1 g/dL. Denies abdominal pain, hematochezia or melenic stools. Current Hospital Schedued Meds   pantoprazole  40 mg IntraVENous BID    b complex-C-folic acid  1 capsule Oral Daily    epoetin katelynn-epbx  10,000 Units IntraVENous Once per day on Tue Thu Sat    atorvastatin  20 mg Oral Daily    calcium elemental  500 mg Oral Daily    carvedilol  3.125 mg Oral BID WC    clopidogrel  75 mg Oral Daily    doxazosin  1 mg Oral Daily    levothyroxine  137 mcg Oral Daily    sevelamer  800 mg Oral TID      Current Hospital IV Meds    Current Hospital Prn Meds  ondansetron **OR** ondansetron, polyethylene glycol    No intake/output data recorded. Physical Exam:  VITAL SIGNS: /68   Pulse 91   Temp 98.5 °F (36.9 °C)   Resp 18   Ht 5' 10\" (1.778 m)   Wt 164 lb 10.9 oz (74.7 kg)   SpO2 95%   BMI 23.63 kg/m²   Wt Readings from Last 3 Encounters:   06/14/22 164 lb 10.9 oz (74.7 kg)   06/05/22 194 lb (88 kg)   05/26/22 194 lb 3.6 oz (88.1 kg)     Constitutional: Well developed, Well nourished, No acute distress, Non-toxic appearance. HENT: Normocephalic, Atraumatic, Bilateral external ears normal, Oropharynx moist, No oral exudates, Nose normal.   Eyes: Conjunctiva normal, No discharge. Neck: Normal range of motion, No tenderness  Cardiovascular: Normal heart rate, Normal rhythm, No murmurs, No rubs. Thorax & Lungs: Normal breath sounds, No respiratory distress.    Abdomen: normal bowel sounds, soft, non tender, non distended, no hernias   Rectal: Deferred. Skin: Warm, Dry, No erythema  Neurologic: Alert & oriented x 3. Right sided weakness. Labs:  Recent Labs     06/12/22  0216 06/12/22  1740 06/14/22  0719   WBC 12.0*  --  17.4*   HGB 7.0* 7.0* 7.4*   HCT 21.9* 21.5* 25.1*     --  448       Recent Labs     06/12/22  0216 06/13/22  0657 06/14/22  0719    138 137   K 3.7  3.7 4.3 4.7   CL 99 99 97*   CO2 27 26 20*   BUN 22* 32* 49*       Lab Results   Component Value Date    LABALBU 2.2 06/14/2022    LABPROT 1.4 05/25/2018    ALKPHOS 611 06/10/2022    ALT 13 06/10/2022    AST 27 06/10/2022    BILITOT 0.6 06/10/2022    BILIDIR 0.5 04/02/2018       Lab Results   Component Value Date    LIPASE 16.0 06/05/2022    LIPASE 12.0 (L) 05/17/2022    LIPASE 27.0 05/03/2018    LIPASE 139.0 (H) 04/13/2018     No results found for: AMYLASE    Lab Results   Component Value Date    INR 1.21 (H) 04/04/2018    INR 1.23 (H) 04/03/2018    INR 1.23 (H) 02/16/2018    PROTIME 13.7 (H) 04/04/2018    PROTIME 13.9 (H) 04/03/2018    PROTIME 13.9 (H) 02/16/2018       Assessment and Plan:  69-year-old male with history of diabetes mellitus type 2, end-stage renal disease on hemodialysis, anemia of chronic disease, hypertension and hyperlipidemia and recent acute CVA on aspirin and Plavix admitted for new right-sided weakness concerning for recurrent CVA. GI follows for acute on chronic anemia with hemoglobin down trended to 6.6 g/dL from 7.1 g/dL    Impression: Acute on chronic anemia in setting of end-stage renal disease on hemodialysis and recent CVA on DAPT. No evidence of acute GI bleed. Plan:  Continue Protonix 40 mg orally twice daily  Hb/Hct stable. Hb 7.4 g/dL today. Monitor hemoglobin and hematocrit and transfuse to maintain hemoglobin > 7 g/dL  Clinical watch for now. To consider endoscopic intervention in the setting of acute overt GI bleeding. GI to sign off. Recall as needed. Neetu Hess MD  [facility]  6/14/2022    [phone].  Also available via [employer]    Please note that some or all of this record was generated using voice recognition software. If there are any questions about the content of this document, please contact the author as some errors in translation may have occurred.

## 2022-06-14 NOTE — PROGRESS NOTES
Physical Therapy  Facility/Department: [facility] B[facility] - MED SURG  Daily Treatment Note  NAME: Maxi Angeles  : 1950  MRN: [id number]    Date of Service: 2022    Discharge Recommendations:  Subacute/Skilled Nursing Facility   PT Equipment Recommendations  Equipment Needed: No  Other: defer to next level of care    Patient Diagnosis(es): The primary encounter diagnosis was Altered mental status, unspecified altered mental status type. Diagnoses of Right-sided sensory deficit present, Right sided weakness, and Confusion were also pertinent to this visit. Assessment   Assessment: Pt seen as cotx with OT to progress functional mobility safely and maximize functional outcomes compared to 1:1 session. Pt requires max(A)x2 for supine to sit; max(A)x1 + min(A)x1 for sit to supine and max(A) for sitting balance for limited time (2-3 minutes). Session limited by fatigue and endurance and pt becoming agitated easily. Pt would benefit from continued skilled PT to address current deficits. Recommend SNF upon d/c  Activity Tolerance: Patient limited by fatigue;Patient limited by pain; Patient limited by endurance;Treatment limited secondary to agitation  Equipment Needed: No  Other: defer to next level of care     Plan    Plan  Plan: 3-5 times per week  Specific Instructions for Next Treatment: progress mobility as tolerated  Current Treatment Recommendations: Strengthening; Wheelchair mobility training;Equipment evaluation, education, & procurement;Balance training;Gait training;Functional mobility training;Positioning;Home exercise program;Neuromuscular re-education;Transfer training; Safety education & training; Therapeutic activities; Patient/Caregiver education & training; Endurance training     Restrictions  Restrictions/Precautions  Restrictions/Precautions: Fall Risk,General Precautions  Position Activity Restriction  Other position/activity restrictions: Telemetry, R chest port, IV, HD     Subjective Subjective  Subjective: Pt supine in bed upon arrival, appears drowsy throughout session. HD this AM  Pain: Complaint of back pain with activity. Orientation  Overall Orientation Status: Impaired  Orientation Level: Disoriented to place; Disoriented to situation;Disoriented to time;Disoriented to person (Pt gave wrong birthday repeatedly)  Cognition  Overall Cognitive Status: Exceptions  Arousal/Alertness: Inconsistent responses to stimuli  Following Commands: Inconsistently follows commands  Attention Span: Difficulty dividing attention; Difficulty attending to directions; Unable to maintain attention  Memory: Decreased short term memory;Decreased recall of recent events;Decreased long term memory;Decreased recall of precautions;Decreased recall of biographical Information  Safety Judgement: Decreased awareness of need for safety;Decreased awareness of need for assistance  Insights: Not aware of deficits  Initiation: Requires cues for all  Sequencing: Requires cues for all     Objective   Vitals  Heart Rate: 91  Heart Rate Source: Monitor  BP: 117/60  BP Location: Left upper arm  BP Method: Automatic  Patient Position: Semi fowlers  MAP (Calculated): 79  SpO2: 97 %  O2 Device: None (Room air)     Bed Mobility Training  Bed Mobility Training: Yes  Overall Level of Assistance: Maximum assistance;Assist X2;Additional time  Interventions: Verbal cues; Tactile cues; Safety awareness training  Rolling: Maximum assistance; Adaptive equipment; Additional time (VC for use of bed rail)  Supine to Sit: Maximum assistance;Assist X2;Adaptive equipment; Additional time (VC for initation of movement; assist at BLE and trunk)  Sit to Supine: Maximum assistance;Minimum assistance;Assist X2;Additional time; Adaptive equipment (Max(A)x1 at trunk + min(A)x1 at RLE)  Balance  Sitting: Impaired  Sitting - Static: Poor (constant support) (Strong posterior lean; pt tolerates sitting EOB x2-3 minutes)  Sitting - Dynamic: Poor (constant support)           Safety Devices  Type of Devices: All jeovany prominences offloaded;Left in bed;Call light within reach; Bed alarm in place;Nurse notified; Patient at risk for falls; Heels elevated for pressure relief       Goals  Short Term Goals  Time Frame for Short term goals: 1 week 6/19/22  Short term goal 1: Pt will complete supine to/from sit with modA x 2--6/14 progressing  Short term goal 2: Pt will complete bed <> chair t/f with lift equipment or maxA x 2 with STEDY to promote OOB mobility--6/14 progressing  Short term goal 3: Pt will demonstrate improved sitting balance maintaining x 8 minutes with Lorena without overt LOB--6/14 progressing  Short term goal 4: 6/15/22: Pt will participate in 10-12 reps BLE TE to improve strength and ROM and improve performance with functional mobility and gait. --6/14 progressing  Patient Goals   Patient goals : Pt is unable to state d/t cognition    Education  Patient Education  Education Given To: Patient  Education Provided: Role of Therapy;Plan of Care;Orientation;Precautions  Education Provided Comments: Educated on sequence with mobility, importance of OOB mobility and progression of activity  Education Method: Verbal  Barriers to Learning: Cognition  Education Outcome: Unable to demonstrate understanding;Continued education needed    Therapy Time   Individual Concurrent Group Co-treatment   Time In 1441         Time Out 1505         Minutes 24         Timed Code Treatment Minutes: 24 Minutes     If pt is unable to be seen after this session, please let this note serve as discharge summary. Please see case management note for discharge disposition. Thank you.     Iggy Cartagena, PT

## 2022-06-15 ENCOUNTER — APPOINTMENT (OUTPATIENT)
Dept: GENERAL RADIOLOGY | Age: 72
DRG: 064 | End: 2022-06-15
Payer: MEDICARE

## 2022-06-15 ENCOUNTER — APPOINTMENT (OUTPATIENT)
Dept: CT IMAGING | Age: 72
DRG: 064 | End: 2022-06-15
Payer: MEDICARE

## 2022-06-15 PROBLEM — E43 SEVERE MALNUTRITION (HCC): Status: ACTIVE | Noted: 2022-06-15

## 2022-06-15 PROBLEM — E44.0 MODERATE MALNUTRITION (HCC): Status: RESOLVED | Noted: 2022-06-11 | Resolved: 2022-06-15

## 2022-06-15 LAB
ALBUMIN SERPL-MCNC: 2.1 G/DL (ref 3.4–5)
ALP BLD-CCNC: 362 U/L (ref 40–129)
ALT SERPL-CCNC: 9 U/L (ref 10–40)
AMMONIA: 12 UMOL/L (ref 16–60)
ANION GAP SERPL CALCULATED.3IONS-SCNC: 11 MMOL/L (ref 3–16)
AST SERPL-CCNC: 18 U/L (ref 15–37)
BASE EXCESS VENOUS: -1.7 MMOL/L (ref -3–3)
BILIRUB SERPL-MCNC: 0.6 MG/DL (ref 0–1)
BILIRUBIN DIRECT: 0.3 MG/DL (ref 0–0.3)
BILIRUBIN, INDIRECT: 0.3 MG/DL (ref 0–1)
BUN BLDV-MCNC: 35 MG/DL (ref 7–20)
CALCIUM SERPL-MCNC: 9.3 MG/DL (ref 8.3–10.6)
CARBOXYHEMOGLOBIN: 1.9 % (ref 0–1.5)
CHLORIDE BLD-SCNC: 101 MMOL/L (ref 99–110)
CO2: 25 MMOL/L (ref 21–32)
CREAT SERPL-MCNC: 4.7 MG/DL (ref 0.8–1.3)
GFR AFRICAN AMERICAN: 15
GFR NON-AFRICAN AMERICAN: 12
GLUCOSE BLD-MCNC: 135 MG/DL (ref 70–99)
HCO3 VENOUS: 21.7 MMOL/L (ref 23–29)
HCT VFR BLD CALC: 23.8 % (ref 40.5–52.5)
HEMOGLOBIN: 7.7 G/DL (ref 13.5–17.5)
MCH RBC QN AUTO: 27.4 PG (ref 26–34)
MCHC RBC AUTO-ENTMCNC: 32.3 G/DL (ref 31–36)
MCV RBC AUTO: 84.9 FL (ref 80–100)
METHEMOGLOBIN VENOUS: 0.8 %
O2 SAT, VEN: 72 %
O2 THERAPY: ABNORMAL
PCO2, VEN: 31.2 MMHG (ref 40–50)
PDW BLD-RTO: 18.5 % (ref 12.4–15.4)
PH VENOUS: 7.46 (ref 7.35–7.45)
PHOSPHORUS: 2.8 MG/DL (ref 2.5–4.9)
PLATELET # BLD: 470 K/UL (ref 135–450)
PMV BLD AUTO: 7.4 FL (ref 5–10.5)
PO2, VEN: 38.6 MMHG (ref 25–40)
POTASSIUM SERPL-SCNC: 4.6 MMOL/L (ref 3.5–5.1)
PROCALCITONIN: 7.63 NG/ML (ref 0–0.15)
RBC # BLD: 2.8 M/UL (ref 4.2–5.9)
SODIUM BLD-SCNC: 137 MMOL/L (ref 136–145)
T4 FREE: 1.1 NG/DL (ref 0.9–1.8)
TCO2 CALC VENOUS: 23 MMOL/L
TOTAL PROTEIN: 5.8 G/DL (ref 6.4–8.2)
TSH REFLEX: 92.39 UIU/ML (ref 0.27–4.2)
WBC # BLD: 16.1 K/UL (ref 4–11)

## 2022-06-15 PROCEDURE — 87449 NOS EACH ORGANISM AG IA: CPT

## 2022-06-15 PROCEDURE — 71045 X-RAY EXAM CHEST 1 VIEW: CPT

## 2022-06-15 PROCEDURE — 97530 THERAPEUTIC ACTIVITIES: CPT

## 2022-06-15 PROCEDURE — 85027 COMPLETE CBC AUTOMATED: CPT

## 2022-06-15 PROCEDURE — 82803 BLOOD GASES ANY COMBINATION: CPT

## 2022-06-15 PROCEDURE — 92526 ORAL FUNCTION THERAPY: CPT

## 2022-06-15 PROCEDURE — 82140 ASSAY OF AMMONIA: CPT

## 2022-06-15 PROCEDURE — 80069 RENAL FUNCTION PANEL: CPT

## 2022-06-15 PROCEDURE — C9113 INJ PANTOPRAZOLE SODIUM, VIA: HCPCS | Performed by: INTERNAL MEDICINE

## 2022-06-15 PROCEDURE — 70450 CT HEAD/BRAIN W/O DYE: CPT

## 2022-06-15 PROCEDURE — 80076 HEPATIC FUNCTION PANEL: CPT

## 2022-06-15 PROCEDURE — 87040 BLOOD CULTURE FOR BACTERIA: CPT

## 2022-06-15 PROCEDURE — 6370000000 HC RX 637 (ALT 250 FOR IP): Performed by: INTERNAL MEDICINE

## 2022-06-15 PROCEDURE — 87324 CLOSTRIDIUM AG IA: CPT

## 2022-06-15 PROCEDURE — 6360000002 HC RX W HCPCS: Performed by: INTERNAL MEDICINE

## 2022-06-15 PROCEDURE — 84443 ASSAY THYROID STIM HORMONE: CPT

## 2022-06-15 PROCEDURE — 84439 ASSAY OF FREE THYROXINE: CPT

## 2022-06-15 PROCEDURE — 84145 PROCALCITONIN (PCT): CPT

## 2022-06-15 PROCEDURE — 1200000000 HC SEMI PRIVATE

## 2022-06-15 PROCEDURE — 36415 COLL VENOUS BLD VENIPUNCTURE: CPT

## 2022-06-15 RX ADMIN — DOXAZOSIN 1 MG: 2 TABLET ORAL at 10:56

## 2022-06-15 RX ADMIN — PANTOPRAZOLE SODIUM 40 MG: 40 INJECTION, POWDER, FOR SOLUTION INTRAVENOUS at 10:57

## 2022-06-15 RX ADMIN — LEVOTHYROXINE SODIUM 137 MCG: 0.03 TABLET ORAL at 06:00

## 2022-06-15 RX ADMIN — CLOPIDOGREL BISULFATE 75 MG: 75 TABLET ORAL at 10:57

## 2022-06-15 RX ADMIN — SEVELAMER CARBONATE 800 MG: 800 TABLET, FILM COATED ORAL at 11:40

## 2022-06-15 RX ADMIN — ATORVASTATIN CALCIUM 20 MG: 10 TABLET, FILM COATED ORAL at 10:57

## 2022-06-15 RX ADMIN — CARVEDILOL 3.12 MG: 3.12 TABLET, FILM COATED ORAL at 11:38

## 2022-06-15 RX ADMIN — PANTOPRAZOLE SODIUM 40 MG: 40 INJECTION, POWDER, FOR SOLUTION INTRAVENOUS at 20:53

## 2022-06-15 RX ADMIN — SEVELAMER CARBONATE 800 MG: 800 TABLET, FILM COATED ORAL at 18:16

## 2022-06-15 RX ADMIN — NEPHROCAP 1 MG: 1 CAP ORAL at 10:56

## 2022-06-15 RX ADMIN — CARVEDILOL 3.12 MG: 3.12 TABLET, FILM COATED ORAL at 18:23

## 2022-06-15 RX ADMIN — CALCIUM 500 MG: 500 TABLET ORAL at 10:56

## 2022-06-15 ASSESSMENT — PAIN SCALES - GENERAL
PAINLEVEL_OUTOF10: 0
PAINLEVEL_OUTOF10: 0

## 2022-06-15 NOTE — PROGRESS NOTES
[facility] Progress Note           Subjective/   66y.o. year old male who we are seeing in consultation for ESKD on HD. HPI:  HD TTS      ROS:no CP/SOB    SOC: no visitors    Objective/   GEN:  Chronically ill, BP (!) 93/50   Pulse 91   Temp 97.7 °F (36.5 °C) (Axillary)   Resp 20   Ht 5' 10\" (1.778 m)   Wt 162 lb 7.7 oz (73.7 kg)   SpO2 96%   BMI 23.31 kg/m²   HEENT: non-icteric, no JVD  CV: S1, S2 with +amanda, no r/g; no LE edema  RESP: CTA B without w/r/r; breathing wnl  ABD: +bs, soft, nt, no hsm  SKIN: warm, no rashes  ACCESS: R [facility]    Data/  Recent Labs     06/12/22  1740 06/14/22  0719 06/15/22  0643   WBC  --  17.4* 16.1*   HGB 7.0* 7.4* 7.7*   HCT 21.5* 25.1* 23.8*   MCV  --  90.7 84.9   PLT  --  448 470*     Recent Labs     06/13/22  0657 06/14/22  0719 06/15/22  0643    137 137   K 4.3 4.7 4.6   CL 99 97* 101   CO2 26 20* 25   GLUCOSE 112* 127* 135*   PHOS 3.2 4.4 2.8   BUN 32* 49* 35*   CREATININE 5.0* 6.4* 4.7*   LABGLOM 11* 9* 12*   GFRAA 14* 10* 15*       Assessment/     - End stage kidney disease - on HD Tues-Thurs-Sat     - Suspicion for embolic source to recurrent CVA's              Punctate areas noted in the left periventricular white matter, right parietal lobe,               right occipital lobe, and left occipital lobe.  Previous right cerebellar infarct. Vascular surgery consulted     - Left carotid artery stenosis     - Aortic stenosis - moderate to severe     - Hypertension     - DM2     - Anemia - VIRAJ with HD, prn prbc's     - Hypothyroidism    - Malnutrition - started on IDPN at outpatient HD    Plan/     - EDW of 83.5 kg, suspect bed weight inaccuracy  - VIRAJ with HD, prn prbc's  - Trend labs, bp's    ____________________________________  Briseida Conway MD  [facility]  [email or web address]  Office: [phone]

## 2022-06-15 NOTE — PLAN OF CARE
Problem: Discharge Planning  Goal: Discharge to home or other facility with appropriate resources  Outcome: Progressing  Flowsheets (Taken 6/15/2022 1328)  Discharge to home or other facility with appropriate resources: Identify barriers to discharge with patient and caregiver     Problem: Skin/Tissue Integrity  Goal: Absence of new skin breakdown  Description: 1. Monitor for areas of redness and/or skin breakdown  2. Assess vascular access sites hourly  3. Every 4-6 hours minimum:  Change oxygen saturation probe site  4. Every 4-6 hours:  If on nasal continuous positive airway pressure, respiratory therapy assess nares and determine need for appliance change or resting period. Outcome: Progressing     Problem: Safety - Adult  Goal: Free from fall injury  Outcome: Progressing     Problem: Confusion  Goal: Confusion, delirium, dementia, or psychosis is improved or at baseline  Description: INTERVENTIONS:  1. Assess for possible contributors to thought disturbance, including medications, impaired vision or hearing, underlying metabolic abnormalities, dehydration, psychiatric diagnoses, and notify attending LIP  2. [place] high risk fall precautions, as indicated  3. Provide frequent short contacts to provide reality reorientation, refocusing and direction  4. Decrease environmental stimuli, including noise as appropriate  5. Monitor and intervene to maintain adequate nutrition, hydration, elimination, sleep and activity  6. If unable to ensure safety without constant attention obtain sitter and review sitter guidelines with assigned personnel  7.  Initiate Psychosocial CNS and Spiritual Care consult, as indicated  Outcome: Progressing  Flowsheets  Taken 6/15/2022 1328  Effect of thought disturbance (confusion, delirium, dementia, or psychosis) are managed with adequate functional status: [place] high risk fall precautions, as indicated  Taken 6/15/2022 4283  Effect of thought disturbance (confusion, delirium, dementia, or psychosis) are managed with adequate functional status: [place] high risk fall precautions, as indicated     Problem: Neurosensory - Adult  Goal: Achieves stable or improved neurological status  Outcome: Progressing  Flowsheets (Taken 6/15/2022 0900)  Achieves stable or improved neurological status: Assess for and report changes in neurological status     Problem: Musculoskeletal - Adult  Goal: Return mobility to safest level of function  Outcome: Progressing  Flowsheets (Taken 6/15/2022 1328)  Return Mobility to Safest Level of Function: Assess patient stability and activity tolerance for standing, transferring and ambulating with or without assistive devices     Problem: Genitourinary - Adult  Goal: Urinary catheter remains patent  Outcome: Progressing  Flowsheets (Taken 6/15/2022 1328)  Urinary catheter remains patent: Assess patency of urinary catheter     Problem: Metabolic/Fluid and Electrolytes - Adult  Goal: Electrolytes maintained within normal limits  Outcome: Progressing  Flowsheets (Taken 6/15/2022 1328)  Electrolytes maintained within normal limits: Monitor labs and assess patient for signs and symptoms of electrolyte imbalances     Problem: Hematologic - Adult  Goal: Maintains hematologic stability  Outcome: Progressing  Flowsheets (Taken 6/15/2022 1328)  Maintains hematologic stability: Assess for signs and symptoms of bleeding or hemorrhage     Problem: Skin/Tissue Integrity - Adult  Goal: Skin integrity remains intact  Outcome: Progressing  Flowsheets (Taken 6/15/2022 1328)  Skin Integrity Remains Intact: Monitor for areas of redness and/or skin breakdown     Problem: Infection - Adult  Goal: Absence of infection at discharge  Outcome: Progressing  Flowsheets (Taken 6/15/2022 1328)  Absence of infection at discharge: Assess and monitor for signs and symptoms of infection     Problem: Pain  Goal: Verbalizes/displays adequate comfort level or baseline comfort level  Outcome: Progressing     Problem: Chronic Conditions and Co-morbidities  Goal: Patient's chronic conditions and co-morbidity symptoms are monitored and maintained or improved  Outcome: Progressing  Flowsheets (Taken 6/15/2022 1328)  Care Plan - Patient's Chronic Conditions and Co-Morbidity Symptoms are Monitored and Maintained or Improved: Monitor and assess patient's chronic conditions and comorbid symptoms for stability, deterioration, or improvement     Problem: Nutrition Deficit:  Goal: Optimize nutritional status  Outcome: Progressing     Problem: ABCDS Injury Assessment  Goal: Absence of physical injury  Outcome: Progressing

## 2022-06-15 NOTE — PROGRESS NOTES
Physical Therapy  Facility/Department: [facility] B3 - MED SURG  Daily Treatment Note  NAME: Anayeli Mathis  : 1950  MRN: [id number]    Date of Service: 6/15/2022    Discharge Recommendations:  Subacute/Skilled Nursing Facility   PT Equipment Recommendations  Equipment Needed: No  Other: defer to next level of care     AM-PAC Mobility Inpatient   How much difficulty turning over in bed?: A Lot  How much difficulty sitting down on / standing up from a chair with arms?: Unable  How much difficulty moving from lying on back to sitting on side of bed?: A Lot  How much help from another person moving to and from a bed to a chair?: Total  How much help from another person needed to walk in hospital room?: Total  How much help from another person for climbing 3-5 steps with a railing?: Total  AM-PAC Inpatient Mobility Raw Score : 8  AM-PAC Inpatient T-Scale Score : 28.52  Mobility Inpatient CMS 0-100% Score: 86.62  Mobility Inpatient CMS G-Code Modifier : CM    Patient Diagnosis(es): The primary encounter diagnosis was Altered mental status, unspecified altered mental status type. Diagnoses of Right-sided sensory deficit present, Right sided weakness, and Confusion were also pertinent to this visit. Assessment   Assessment: Pt seen as cotx with OT to progress functional mobility safely and maximize functional outcomes compared to 1:1 session. Pt requires max/dep (A)x2 for supine <> sit and max A  for sitting balance for 7 minutes. Session limited by fatigue and endurance and pt becoming agitated easily and non-participatory. Pt would benefit from continued skilled PT to address current deficits. Recommend SNF upon d/c  Activity Tolerance: Other (comment); Patient limited by fatigue  Equipment Needed: No  Other: defer to next level of care     Plan    Plan  Plan: 3-5 times per week  Specific Instructions for Next Treatment: progress mobility as tolerated  Current Treatment Recommendations: Strengthening; Wheelchair mobility training;Equipment evaluation, education, & procurement;Balance training;Gait training;Functional mobility training;Positioning;Home exercise program;Neuromuscular re-education;Transfer training; Safety education & training; Therapeutic activities; Patient/Caregiver education & training; Endurance training     Restrictions  Restrictions/Precautions  Restrictions/Precautions: Fall Risk,General Precautions  Position Activity Restriction  Other position/activity restrictions: Telemetry, R chest port, IV, HD     Subjective    Subjective  Subjective: Pt supine in bed upon arrival, appears drowsy throughout session. Neither agreeable or disagreeable at first but later becomes disagreeable. Pain: no c/o  Orientation  Overall Orientation Status: Impaired  Orientation Level: Unable to assess (pt did not provide verbal response to questions other than \"yes,\" when asked if he goes by \"Lawrence\")  Cognition  Overall Cognitive Status: Exceptions  Arousal/Alertness: Inconsistent responses to stimuli  Following Commands: Inconsistently follows commands (follows less than 10% of commands)  Attention Span: Unable to maintain attention  Initiation: Requires cues for all  Sequencing: Requires cues for all  Cognition Comment: Pt provides minimal attention to therapy staff in room, difficult to assess how much is behavioral versus cognition or a combination of the two. Objective   Vitals:  Vitals  Heart Rate: 87  BP: (!) 103/55  BP Location: Left upper arm  BP Method: Automatic  Patient Position: Semi fowlers  MAP (Calculated): 71  SpO2: 100 %  O2 Device: None (Room air)       Bed Mobility Training  Bed Mobility Training: Yes  Overall Level of Assistance: Total assistance;Assist X2  Interventions: Verbal cues; Tactile cues; Safety awareness training  Rolling: Total assistance  Supine to Sit: Total assistance;Assist X2  Sit to Supine: Total assistance;Assist X2  Scooting:  Total assistance  Balance  Sitting: Impaired (pt sat EOB 7 mins, max A leaning to R and posterior)  Sitting - Static: Poor (constant support)  Transfer Training  Transfer Training: No (unsafe d/t level of alertness/active participation)     PT Exercises  Exercise Treatment: pt seen to be moving all extremeties actively but does not follow commands for ex. Does not speak when spoken to or questioned about pain etc but clearly opens eyes and speaks when offered food stating loud and clear \"no\"     Safety Devices  Type of Devices: Left in bed;Bed alarm in place;Call light within reach;Nurse notified; All jeovany prominences offloaded       Goals  Short Term Goals  Time Frame for Short term goals: 1 week 6/19/22  Short term goal 1: Pt will complete supine to/from sit with modA x 2--6/14 progressing  Short term goal 2: Pt will complete bed <> chair t/f with lift equipment or maxA x 2 with STEDY to promote OOB mobility--6/14 progressing  Short term goal 3: Pt will demonstrate improved sitting balance maintaining x 8 minutes with [place] without overt LOB--6/14 progressing  Short term goal 4: 6/15/22: Pt will participate in 10-12 reps BLE TE to improve strength and ROM and improve performance with functional mobility and gait. --6/14 progressing  Patient Goals   Patient goals : Pt is unable to state d/t cognition    Education  Patient Education  Education Given To: Patient  Education Provided: Role of Therapy;Plan of Care;Orientation;Precautions  Education Provided Comments: Educated on sequence with mobility, importance of OOB mobility and progression of activity  Education Method: Verbal  Barriers to Learning: Cognition  Education Outcome: Unable to demonstrate understanding;Continued education needed    Therapy Time   Individual Concurrent Group Co-treatment   Time In 1305         Time Out 1330         Minutes [id number] [facility], [address] [facility]

## 2022-06-15 NOTE — PROGRESS NOTES
Per Dr. Anne Gallo, please check for c-diff d/t pt. Having multiple loose stools. Pt. Laymon Alessandro with symptoms of loss of appetite and WBC's continued to be elevated. Blood cultures pending. Stool obtained for cdiff specimen.

## 2022-06-15 NOTE — PROGRESS NOTES
Speech Language Pathology  Facility/Department: [facility] B3 - MED SURG  Dysphagia Daily Treatment Note      Recommendations:  Solid Consistency: Downgrade to Soft and Bite Sized (IDDSI 6)  Liquid Consistency: Thin liquids (IDDSI 0)  Medication: Crushable meds crushed in puree  · Assist feed  · Only feed when most alert      NAME: Agustín Mitchell  : 1950  MRN: [id number]    Patient Diagnosis(es):   Patient Active Problem List    Diagnosis Date Noted    Iron deficiency anemia     Acute infective pericarditis     Altered mental status     Moderate malnutrition ([address] 2022    ESRD (end stage renal disease) ([address] 06/10/2022    Acute CVA (cerebrovascular accident) ([address] 06/10/2022    Left carotid stenosis 06/10/2022    Severe aortic stenosis 06/10/2022    Cognitive impairment     Abnormal ECG     PVC (premature ventricular contraction)     Murmur     Anemia in other chronic diseases classified elsewhere     Weakness 2022    Elevated troponin 2022    NSTEMI (non-ST elevated myocardial infarction) ([address] 2018    Chronic kidney disease, stage 5 ([address] 2018    Anemia of chronic renal failure 2018    Crescentic glomerulonephritis 2018    Sepsis ([address] 2018    ARF (acute renal failure) ([address] 2018    Pulmonary nodule     Type 2 diabetes mellitus with renal complication ([address]     Essential hypertension 2018    Hypothyroidism 2018    Mixed hyperlipidemia 2018    Tobacco use disorder 2018    Hyponatremia 2018     Allergies: No Known Allergies     Subjective: Pt seen upright in bed, leans to R side despite multiple attempts at repositioning. Pt kept eyes closed throughout majority of session despite cues and encouragement. Pt minimally interactive with SLP, only providing verbal responses intermittently. RN OK'd SLP entry and therapy.      Pain: pt denied    Current Diet: ADULT ORAL NUTRITION 06/15/2022)  Goal 1: The patient will tolerate recommended diet with no clinical s/s of aspiration 5/5. 06/15: ongoing, see above. Goal 2: The patient/caregiver will demonstrate understanding of compensatory swallow strategies, for improved swallow safety. 06/15: ongoing; pt needs reinforcement     Long Term Goals:   Timeframe for Long Term Goals: (7 days 06/17/2022)  Goal 1: The patient will tolerate least restrictive diet with no clinical s/s of aspiration or worsening respiratory/pulmonary status. 06/15: ongoing; solid PO diet downgraded to soft and bite sized this date    Speech/Language/Cog Goals: n/a    Recommendations:  Solid Consistency: Downgrade to Soft and Bite Sized (IDDSI 6)  Liquid Consistency: Thin liquids (IDDSI 0)  Medication: Crushable meds crushed in puree  Assist feed    Patient/Family/Caregiver Education:  See above    Compensatory Strategies:   HOB 90* and 30\" after meals; small bites/sips; alternate solids/liquids every 3-5 bites; oral care after every meal; assist feed    Plan:    Continued Dysphagia treatment with goals per plan of care. Discharge Recommendations: per PT/OT recs    If pt discharges from hospital prior to Speech/Swallowing discharge, this note serves as tx and discharge summary.      Total Treatment Time / Charges     Time in Time out Total Time / units   Cognitive Tx         Speech Tx      Dysphagia Tx 0877 0426 18 min / 1 unit     Signature:  Sebas Lang M.S. [id number] [facility]  Speech-language pathologist  [id number]

## 2022-06-15 NOTE — PROGRESS NOTES
Measures:  Height: 5' 10\" (177.8 cm)  Ideal Body Weight (IBW): 166 lbs (75 kg)       Current Body Weight: 162 lb (73.5 kg), 97.6 % IBW. Weight Source: Bed Scale  Current BMI (kg/m2): 23.2  Usual Body Weight: 191 lb (86.6 kg) (5/19/22 bed scale)  % Weight Change (Calculated): -11.5                    BMI Categories: Normal Weight (BMI 22.0 to 24.9) age over 72    Estimated Daily Nutrient Needs:  Energy Requirements Based On: Kcal/kg (25-30)  Weight Used for Energy Requirements: Current  Energy (kcal/day): 1985-2232  Weight Used for Protein Requirements: Current (1-1.2)  Protein (g/day): 77-92  Method Used for Fluid Requirements: 1 ml/kcal  Fluid (ml/day): 6185-2522    Nutrition Diagnosis:   · Moderate malnutrition related to inadequate protein-energy intake as evidenced by intake 0-25%,Criteria as identified in malnutrition assessment,weight loss    Nutrition Interventions:   Food and/or Nutrient Delivery: Continue Current Diet,Continue Oral Nutrition Supplement  Nutrition Education/Counseling: No recommendation at this time  Coordination of Nutrition Care: Continue to monitor while inpatient  Plan of Care discussed with: RN    Goals:  Previous Goal Met: No Progress toward Goal(s)  Goals: PO intake 50% or greater,prior to discharge       Nutrition Monitoring and Evaluation:   Behavioral-Environmental Outcomes: None Identified  Food/Nutrient Intake Outcomes: Food and Nutrient Intake,Supplement Intake  Physical Signs/Symptoms Outcomes: Weight,Nutrition Focused Physical Findings,Biochemical Data    Discharge Planning:     Too soon to determine     Adele Vance [address] [facility] [address]  Contact: [phone]

## 2022-06-15 NOTE — PROGRESS NOTES
Hospitalist Progress Note      PCP: Arias Vee MD    Date of Admission: 6/10/2022    Chief Complaint: Right sided weakness     Hospital Course:   66 y.o. male who presented to [facility] with complaints of right sided weakness. PMHx significant for DM2, ESRD (started HD in April '21), HTN, recent admission for CVA. Prior work-up included right/posterior CVA by MRI and some left carotid stenosis (not the symptomatic side). Cardiac work-up revealed severe AS and he was referred for TAVR. Discharged to [facility]. He returns after staff found him kneeling on the ground. He apparently had some new weakness on the right side compared to prior. LKW was the evening prior. He was a poor historian on presentation. Stroke team was contacted. CTA showed 70% left ICA stenosis, no acute findings. He was not a candidate for any TPA or intervention. Subjective:   Pt is on RA. Afebrile. VSS. Pt lethargic, opens eyes to voice, answers questions with brief yes or no. Says he feels \"fine\". No new focal neuro findings. Pt with loose stools per nursing.      Medications:  Reviewed    Infusion Medications     Scheduled Medications    pantoprazole  40 mg IntraVENous BID    b complex-C-folic acid  1 capsule Oral Daily    epoetin katelynn-epbx  10,000 Units IntraVENous Once per day on Tue Thu Sat    atorvastatin  20 mg Oral Daily    calcium elemental  500 mg Oral Daily    carvedilol  3.125 mg Oral BID WC    clopidogrel  75 mg Oral Daily    doxazosin  1 mg Oral Daily    levothyroxine  137 mcg Oral Daily    sevelamer  800 mg Oral TID WC     PRN Meds: heparin (porcine), ondansetron **OR** ondansetron, polyethylene glycol      Intake/Output Summary (Last 24 hours) at 6/15/2022 1116  Last data filed at 6/15/2022 0646  Gross per 24 hour   Intake --   Output 0 ml   Net 0 ml       Physical Exam Performed:    BP (!) 93/50   Pulse 91   Temp 97.5 °F (36.4 °C) (Axillary)   Resp 20   Ht 5' 10\" (1.778 m)   Wt 162 lb 7.7 oz (73.7 kg) SpO2 96%   BMI 23.31 kg/m²     General appearance: No apparent distress, appears stated age. Lethargic. HEENT: Pupils equal, round, and reactive to light. Conjunctivae/corneas clear. Neck: Supple, with full range of motion. No jugular venous distention. Trachea midline. Respiratory:  Normal respiratory effort. Clear anterior breath sounds. Cardiovascular: Regular rate and rhythm with normal S1/S2. Abdomen: Soft, non-tender, non-distended with normal bowel sounds. Musculoskeletal: No clubbing, cyanosis or edema bilaterally. Skin: Skin color, texture, turgor normal.  No rashes or lesions. Neurologic: Lethargic, right facial droop, generalized weakness. Psychiatric: Defer  Capillary Refill: Brisk,3 seconds, normal   Peripheral Pulses: +2 palpable, equal bilaterally       Labs:   Recent Labs     06/12/22  1740 06/14/22  0719 06/15/22  0643   WBC  --  17.4* 16.1*   HGB 7.0* 7.4* 7.7*   HCT 21.5* 25.1* 23.8*   PLT  --  448 470*     Recent Labs     06/13/22  0657 06/14/22  0719 06/15/22  0643    137 137   K 4.3 4.7 4.6   CL 99 97* 101   CO2 26 20* 25   BUN 32* 49* 35*   CREATININE 5.0* 6.4* 4.7*   CALCIUM 9.4 9.5 9.3   PHOS 3.2 4.4 2.8     Urinalysis:      Lab Results   Component Value Date    NITRU Negative 05/19/2022    WBCUA 3-5 05/19/2022    BACTERIA Rare 05/25/2018    RBCUA 3-4 05/19/2022    BLOODU SMALL 05/19/2022    SPECGRAV 1.010 05/19/2022    GLUCOSEU Negative 05/19/2022       Radiology:  XR CHEST PORTABLE   Final Result   No acute cardiopulmonary disease is identified. Stable enlargement of cardiac silhouette. MRI BRAIN WO CONTRAST   Final Result   There are approximately 4 punctate areas of restricted diffusion consistent   with acute areas of infarct. There is a punctate area of restricted   diffusion in the left periventricular white matter, the right parietal lobe,   the right occipital lobe, and the left occipital lobe. An embolic source   should be considered. Cerebral atrophy. Moderate chronic small vessel ischemic changes. Remote   infarct in the right cerebellum. XR CHEST PORTABLE   Final Result   Mild vascular congestion is stable. CTA HEAD NECK W CONTRAST   Final Result   1. Severe (greater than 70%) stenosis of the proximal left ICA. 2. The right vertebral artery V4 segment after the PICA takeoff is not well   visualized and may be occluded or congenitally diminutive. 3. The basilar artery appears diminutive, may in part be congenital given   presence of bilateral posterior communicating arteries. 4. The remaining major intracranial arteries appear patent without   significant stenosis. 5. Multilevel cervical spondylosis with associated severe neural foraminal   narrowing as well as C5-C6 spinal canal stenosis. 6. Scattered periapical lucency such as around the left lower central incisor   and right lower 1st molar. Can correlate with dental examination. CT HEAD WO CONTRAST   Final Result   No acute intracranial abnormality. The findings were sent to the Radiology Results [address] at 8:43   am on 6/10/2022 to be communicated to a licensed caregiver.              Assessment/Plan:    Active Hospital Problems    Diagnosis     Altered mental status [R41.82]      Priority: Medium    Moderate malnutrition ([address] [E44.0]      Priority: Medium    ESRD (end stage renal disease) ([address] [N18.6]      Priority: Medium    Acute CVA (cerebrovascular accident) ([address] [I63.9]      Priority: Medium    Left carotid stenosis [I65.22]      Priority: Medium    Severe aortic stenosis [I35.0]      Priority: Medium    Elevated troponin [R77.8]      Priority: Medium    NSTEMI (non-ST elevated myocardial infarction) ([address] [I21.4]     Hypothyroidism [E03.9]     Essential hypertension [I10]     Type 2 diabetes mellitus with renal complication (HCC) [B36.89]        Acute bilateral CVAs:  - CT head in the ED showed no acute benign:   - Controlled. Continue current medication regimen, monitor and adjust as needed.      Hypothyroidism:   - TSH 96.09 with low/normal FT4 on 5/21/22. Plan was to increase his Synthroid to 150 mcg from 137 mcg during his May hospital admission, however it appears he was dc'd on 137 mcg.   - Continue levothyroxine 137 mcg for now. Repeat TSH sent on 6/15. If elevated would recommend increasing the dose. Lethargy:  - Has been an ongoing issue since his initial stroke per his daughter.   - Could be secondary to CVAs in setting of HD.   - He is not on any sedating meds. - VBG unremarkable. Ammonia is normal. Check TSH. Repeat head CT today. Recent B12/folate were normal.     Leukocytosis:  - Etiology unclear. Persistently elevated since May '22.   - Pt is afebrile. No overt signs of infection. CXR 6/15 negative for infiltrate. Pt does not make urine therefore will not send UA/culture. Check blood cx's. Send C. Diff if he develops diarrhea. Procal elevated but not helpful in context of ESRD. - Repeat CBC in am.     History of CHRIS:  - Per pt's daughter. Not on CPAP. Goals of care:  - Addressed code status / goals of care with his daughter Virgilio Sellers today. Pt's wife had valve replacement today. Plan to continue medical treatment. Pt is to remain a full code. DVT Prophylaxis: SCDs  Diet: ADULT ORAL NUTRITION SUPPLEMENT; Breakfast, Dinner; Renal Oral Supplement  ADULT DIET; Dysphagia - Soft and Bite Sized  Code Status: Full Code    PT/OT Eval Status: ordered with recs for SNF    Dispo - ~2 days pending clinical course, [facility]      [address], APRN - CNP

## 2022-06-15 NOTE — PROGRESS NOTES
Occupational Therapy  Facility/Department: [facility] B3 - MED SURG  Daily Treatment Note  NAME: Regina Bass  : 1950  MRN: [id number]    Date of Service: 6/15/2022    Discharge Recommendations:  Subacute/Skilled Nursing Facility     AM-PAC score  AM-PAC Inpatient Daily Activity Raw Score: 6 (06/15/22 144)  AM-PAC Inpatient ADL T-Scale Score : 17.07 (06/15/22 144)  ADL Inpatient CMS 0-100% Score: 100 (06/15/22 1442)  ADL Inpatient CMS G-Code Modifier : CN (06/15/22 1442)      Patient Diagnosis(es): The primary encounter diagnosis was Altered mental status, unspecified altered mental status type. Diagnoses of Right-sided sensory deficit present, Right sided weakness, and Confusion were also pertinent to this visit. Assessment    Assessment: Pt with poor tolerance of OT treatment, provides minimal to no participation in most tasks, following less than 10% of commands. Pt total A of 2 for bed mobility and max A to maintain sitting balance at EOB. Co-tx collaboration this date with PT staff to safely progress pt toward goals. Pt will have better occupational performance outcomes within a co-treatment than 1:1 session. Pt functioning well below his baseline and would benefit from continued skilled OT as participation allows. Activity Tolerance: Other;Patient limited by fatigue (limited by participation)  Discharge Recommendations: [address]  Times per Week: 2-3 x/week     Restrictions  Restrictions/Precautions  Restrictions/Precautions: Fall Risk;General Precautions  Position Activity Restriction  Other position/activity restrictions: Telemetry, R chest port, IV, HD    Subjective   Subjective  Subjective: Pt resting in bed at approach, lethargic, minimal attention to writer and opening eyes on few occasions briefly. Pain: No c/o pain made during treatment.     Orientation  Overall Orientation Status: Impaired  Orientation Level: Unable to assess (pt did not provide verbal response to questions other than \"yes,\" when asked if he goes by \"Lawrence\")    Cognition  Overall Cognitive Status: Exceptions  Arousal/Alertness: Inconsistent responses to stimuli  Following Commands: Inconsistently follows commands (follows less than 10% of commands)  Attention Span: Unable to maintain attention  Initiation: Requires cues for all  Sequencing: Requires cues for all  Cognition Comment: Pt provides minimal attention to writer and PTA in room, difficult to assess how much is behavioral versus cognition or a combination of the two. Objective    Vitals  Vitals  Heart Rate: 87  BP: (!) 103/55  BP Location: Left upper arm  BP Method: Automatic  Patient Position: Semi fowlers  MAP (Calculated): 71  SpO2: 100 %  O2 Device: None (Room air)    Bed Mobility Training  Bed Mobility Training: Yes  Overall Level of Assistance: Total assistance;Assist X2  Interventions: Verbal cues; Tactile cues; Safety awareness training  Rolling: Total assistance  Supine to Sit: Total assistance;Assist X2  Sit to Supine: Total assistance;Assist X2  Scooting: Total assistance    Balance  Sitting: Impaired (pt sat EOB 7 mins, max A leaning to R and posterior)  Sitting - Static: Poor (constant support)  Transfer Training  Transfer Training: No (unsafe d/t level of alertness/active participation)     ADL  Feeding: Dependent/Total  Feeding Skilled Clinical Factors: Pt ate one bite of ice cream then clearly looked writer in the eyes and stated \"no,\" with attempts at further bites/sips of food     OT Exercises  Exercise Treatment: Pt not attempting participation in UE therex despite cues, pt observed scatching nose with both UE, so does have AROM in BUE, however does not follow commands for exercise. Safety Devices  Type of Devices: Left in bed;Bed alarm in place;Call light within reach;Nurse notified; All jeovany prominences offloaded     Patient Education  Education Given To: Patient  Education Provided: Role of Therapy;Plan of Care;Orientation  Education Method: Verbal;Demonstration  Barriers to Learning: Cognition  Education Outcome: Unable to verbalize; Unable to demonstrate understanding;Continued education needed    Goals  Short Term Goals  Time Frame for Short term goals: 1 week(6-19-22)  Short Term Goal 1: set up for UE light ADL's in supported sitting in bed or chair by 6-19-22  Short Term Goal 2: min assist with 10 reps  BUE AAROM exercises by 6-17-22  Patient Goals   Patient goals : unable to identify/verbalize at this time       Therapy Time   Individual Concurrent Group Co-treatment   Time In [address] [place] [address]         Time Out 1330         Minutes [address], MADERA/L

## 2022-06-15 NOTE — PROGRESS NOTES
MD notified: Arvid Dress pt WBC jumped from 12.0 on 6/13 to 17.4 on 6/14 does not appear to have been addressed today in MD note. Is any follow up or abx needed?  Need Callback: NO CALLBACK REQ B3 TELEMETRY ROUTINE  Read 11:44 PM   6/14/22 11:45 PM   Read Roseline Night note, Dx # 10      Note stated repeate CBC in am

## 2022-06-16 LAB
ALBUMIN SERPL-MCNC: 2.3 G/DL (ref 3.4–5)
ANION GAP SERPL CALCULATED.3IONS-SCNC: 13 MMOL/L (ref 3–16)
BUN BLDV-MCNC: 54 MG/DL (ref 7–20)
CALCIUM SERPL-MCNC: 9.7 MG/DL (ref 8.3–10.6)
CHLORIDE BLD-SCNC: 100 MMOL/L (ref 99–110)
CO2: 23 MMOL/L (ref 21–32)
CREAT SERPL-MCNC: 6.2 MG/DL (ref 0.8–1.3)
GFR AFRICAN AMERICAN: 11
GFR NON-AFRICAN AMERICAN: 9
GLUCOSE BLD-MCNC: 122 MG/DL (ref 70–99)
HCT VFR BLD CALC: 23.7 % (ref 40.5–52.5)
HEMOGLOBIN: 7.5 G/DL (ref 13.5–17.5)
MCH RBC QN AUTO: 27.6 PG (ref 26–34)
MCHC RBC AUTO-ENTMCNC: 31.5 G/DL (ref 31–36)
MCV RBC AUTO: 87.8 FL (ref 80–100)
PDW BLD-RTO: 18.3 % (ref 12.4–15.4)
PHOSPHORUS: 2.5 MG/DL (ref 2.5–4.9)
PLATELET # BLD: 446 K/UL (ref 135–450)
PMV BLD AUTO: 7.3 FL (ref 5–10.5)
POTASSIUM SERPL-SCNC: 4.6 MMOL/L (ref 3.5–5.1)
RBC # BLD: 2.7 M/UL (ref 4.2–5.9)
SODIUM BLD-SCNC: 136 MMOL/L (ref 136–145)
WBC # BLD: 14.1 K/UL (ref 4–11)

## 2022-06-16 PROCEDURE — 36415 COLL VENOUS BLD VENIPUNCTURE: CPT

## 2022-06-16 PROCEDURE — 6360000002 HC RX W HCPCS

## 2022-06-16 PROCEDURE — 90935 HEMODIALYSIS ONE EVALUATION: CPT

## 2022-06-16 PROCEDURE — 6360000002 HC RX W HCPCS: Performed by: INTERNAL MEDICINE

## 2022-06-16 PROCEDURE — 94761 N-INVAS EAR/PLS OXIMETRY MLT: CPT

## 2022-06-16 PROCEDURE — 85027 COMPLETE CBC AUTOMATED: CPT

## 2022-06-16 PROCEDURE — 6370000000 HC RX 637 (ALT 250 FOR IP): Performed by: INTERNAL MEDICINE

## 2022-06-16 PROCEDURE — 1200000000 HC SEMI PRIVATE

## 2022-06-16 PROCEDURE — 80069 RENAL FUNCTION PANEL: CPT

## 2022-06-16 PROCEDURE — C9113 INJ PANTOPRAZOLE SODIUM, VIA: HCPCS | Performed by: INTERNAL MEDICINE

## 2022-06-16 PROCEDURE — 2700000000 HC OXYGEN THERAPY PER DAY

## 2022-06-16 RX ORDER — HEPARIN SODIUM 1000 [USP'U]/ML
INJECTION, SOLUTION INTRAVENOUS; SUBCUTANEOUS
Status: DISPENSED
Start: 2022-06-16 | End: 2022-06-17

## 2022-06-16 RX ORDER — LEVOTHYROXINE SODIUM 0.15 MG/1
150 TABLET ORAL DAILY
Status: DISCONTINUED | OUTPATIENT
Start: 2022-06-17 | End: 2022-06-17 | Stop reason: HOSPADM

## 2022-06-16 RX ADMIN — EPOETIN ALFA-EPBX 10000 UNITS: 10000 INJECTION, SOLUTION INTRAVENOUS; SUBCUTANEOUS at 22:36

## 2022-06-16 RX ADMIN — PANTOPRAZOLE SODIUM 40 MG: 40 INJECTION, POWDER, FOR SOLUTION INTRAVENOUS at 09:30

## 2022-06-16 RX ADMIN — LEVOTHYROXINE SODIUM 137 MCG: 0.03 TABLET ORAL at 05:58

## 2022-06-16 ASSESSMENT — PAIN SCALES - GENERAL
PAINLEVEL_OUTOF10: 0

## 2022-06-16 ASSESSMENT — PAIN SCALES - WONG BAKER: WONGBAKER_NUMERICALRESPONSE: 0

## 2022-06-16 NOTE — CARE COORDINATION
Gerard spoke with nursing and attending Yas STEVENS. Tomer spoke with daughter Nirav Gore. Pt's wife discharging from hospital today. They are agreeable for a palliative care consult to discuss options. Pt's precert pending with [facility] [place] [place].

## 2022-06-16 NOTE — PROGRESS NOTES
[facility] Progress Note           Subjective/   66y.o. year old male who we are seeing in consultation for ESKD on HD. HPI:  HD TTS  For HD today    ROS:no CP/SOB    SOC: no visitors    Objective/   GEN:  Chronically ill, BP (!) 115/59   Pulse 84   Temp 98 °F (36.7 °C) (Oral)   Resp 16   Ht 5' 10\" (1.778 m)   Wt 162 lb 7.7 oz (73.7 kg)   SpO2 97%   BMI 23.31 kg/m²   HEENT: non-icteric, no JVD  CV: S1, S2 with +amanda, no r/g; no LE edema  RESP: CTA B without w/r/r; breathing wnl  ABD: +bs, soft, nt, no hsm  SKIN: warm, no rashes  ACCESS: R [facility] [place]    Data/  Recent Labs     06/14/22  0719 06/15/22  0643 06/16/22  0659   WBC 17.4* 16.1* 14.1*   HGB 7.4* 7.7* 7.5*   HCT 25.1* 23.8* 23.7*   MCV 90.7 84.9 87.8    470* 446     Recent Labs     06/14/22  0719 06/15/22  0643    137   K 4.7 4.6   CL 97* 101   CO2 20* 25   GLUCOSE 127* 135*   PHOS 4.4 2.8   BUN 49* 35*   CREATININE 6.4* 4.7*   LABGLOM 9* 12*   GFRAA 10* 15*       Assessment/     - End stage kidney disease - on HD Tues-Thurs-Sat     - Suspicion for embolic source to recurrent CVA's              Punctate areas noted in the left periventricular white matter, right parietal lobe,               right occipital lobe, and left occipital lobe.  Previous right cerebellar infarct.       - Left carotid artery stenosis     - Aortic stenosis - moderate to severe     - Hypertension     - DM2     - Anemia - VIRAJ with HD, prn prbc's     - Hypothyroidism    - Malnutrition - started on IDPN at outpatient HD    Plan/     -HD today; EDW of 83.5 kg, suspect bed weight inaccuracy  - VIRAJ with HD, prn prbc's  - Trend labs, bp's    ____________________________________  Miladys Bates MD  [facility]  [email or web address]  Office: [phone]

## 2022-06-16 NOTE — PROGRESS NOTES
Hospitalist Progress Note      PCP: Sameer Valdivia MD    Date of Admission: 6/10/2022    Chief Complaint:   Right sided weakness     Hospital Course:   66 y. o. male who presented to [facility] with complaints of right sided weakness. PMHx significant for DM2, ESRD (started HD in April '21), HTN, recent admission for CVA. Prior work-up included right/posterior CVA by MRI and some left carotid stenosis (not the symptomatic side). Cardiac work-up revealed severe AS and he was referred for TAVR. [facility]. He returns after staff found him kneeling on the ground. He apparently had some new weakness on the right side compared to prior. LKW was the evening prior. He was a poor historian on presentation. Stroke team was contacted. CTA showed 70% left ICA stenosis, no acute findings. He was not a candidate for any TPA or intervention.       Subjective:   Seen lying in bed, on room air,  opens eyes to voice, does not follow commands, only grunts in response to questions,  Severely confused       Medications:  Reviewed    Infusion Medications   Scheduled Medications    [START ON 6/17/2022] levothyroxine  150 mcg Oral Daily    pantoprazole  40 mg IntraVENous BID    b complex-C-folic acid  1 capsule Oral Daily    epoetin katelynn-epbx  10,000 Units IntraVENous Once per day on Tue Thu Sat    atorvastatin  20 mg Oral Daily    calcium elemental  500 mg Oral Daily    carvedilol  3.125 mg Oral BID WC    clopidogrel  75 mg Oral Daily    doxazosin  1 mg Oral Daily    sevelamer  800 mg Oral TID WC     PRN Meds: mineral oil-hydrophilic petrolatum, heparin (porcine), ondansetron **OR** ondansetron, polyethylene glycol      Intake/Output Summary (Last 24 hours) at 6/16/2022 0545  Last data filed at 6/16/2022 1007  Gross per 24 hour   Intake 60 ml   Output --   Net 60 ml       Physical Exam Performed:    BP (!) 124/59   Pulse 88   Temp 98.8 °F (37.1 °C) (Oral)   Resp 16   Ht 5' 10\" (1.778 m)   Wt 162 lb 7.7 oz (73.7 kg)   SpO2 97%   BMI 23.31 kg/m²     General appearance: does not respond to questions, does not appear to be in distress   HEENT: Pupils equal, round, and reactive to light. Conjunctivae/corneas clear. Neck: Supple, with full range of motion. No jugular venous distention. Trachea midline. Respiratory:  Normal respiratory effort. Clear to auscultation, bilaterally without Rales/Wheezes/Rhonchi. Cardiovascular: Regular rate and rhythm with normal S1/S2 without murmurs, rubs or gallops. Abdomen: Soft, non-tender, non-distended with normal bowel sounds. Musculoskeletal: No clubbing, cyanosis or edema bilaterally. Full range of motion without deformity. Skin: Skin color, texture, turgor normal.  No rashes or lesions. Neurologic:  Right facial droop, generalized weakness, somnolent   Psychiatric: Severely confused   Capillary Refill: Brisk,3 seconds, normal   Peripheral Pulses: +2 palpable, equal bilaterally       Labs:   Recent Labs     06/14/22  0719 06/15/22  0643 06/16/22  0659   WBC 17.4* 16.1* 14.1*   HGB 7.4* 7.7* 7.5*   HCT 25.1* 23.8* 23.7*    470* 446     Recent Labs     06/14/22  0719 06/15/22  0643 06/16/22  0700    137 136   K 4.7 4.6 4.6   CL 97* 101 100   CO2 20* 25 23   BUN 49* 35* 54*   CREATININE 6.4* 4.7* 6.2*   CALCIUM 9.5 9.3 9.7   PHOS 4.4 2.8 2.5     Recent Labs     06/15/22  0643   AST 18   ALT 9*   BILIDIR 0.3   BILITOT 0.6   ALKPHOS 362*     No results for input(s): INR in the last 72 hours. No results for input(s): Gladys Bailey in the last 72 hours. Urinalysis:      Lab Results   Component Value Date    NITRU Negative 05/19/2022    WBCUA 3-5 05/19/2022    BACTERIA Rare 05/25/2018    RBCUA 3-4 05/19/2022    BLOODU SMALL 05/19/2022    SPECGRAV 1.010 05/19/2022    GLUCOSEU Negative 05/19/2022       Radiology:  CT HEAD WO CONTRAST   Final Result   Atrophy and small-vessel ischemic change, similar to prior.   No hemorrhage or   mass noted         XR CHEST PORTABLE   Final Result   No acute cardiopulmonary disease is identified. Stable enlargement of cardiac silhouette. MRI BRAIN WO CONTRAST   Final Result   There are approximately 4 punctate areas of restricted diffusion consistent   with acute areas of infarct. There is a punctate area of restricted   diffusion in the left periventricular white matter, the right parietal lobe,   the right occipital lobe, and the left occipital lobe. An embolic source   should be considered. Cerebral atrophy. Moderate chronic small vessel ischemic changes. Remote   infarct in the right cerebellum. XR CHEST PORTABLE   Final Result   Mild vascular congestion is stable. CTA HEAD NECK W CONTRAST   Final Result   1. Severe (greater than 70%) stenosis of the proximal left ICA. 2. The right vertebral artery V4 segment after the PICA takeoff is not well   visualized and may be occluded or congenitally diminutive. 3. The basilar artery appears diminutive, may in part be congenital given   presence of bilateral posterior communicating arteries. 4. The remaining major intracranial arteries appear patent without   significant stenosis. 5. Multilevel cervical spondylosis with associated severe neural foraminal   narrowing as well as C5-C6 spinal canal stenosis. 6. Scattered periapical lucency such as around the left lower central incisor   and right lower 1st molar. Can correlate with dental examination. CT HEAD WO CONTRAST   Final Result   No acute intracranial abnormality. The findings were sent to the Radiology Results [address] at 8:43   am on 6/10/2022 to be communicated to a licensed caregiver.              Consults  IP CONSULT TO NEPHROLOGY  IP CONSULT TO NEUROLOGY  IP CONSULT TO VASCULAR SURGERY  IP CONSULT TO GI      Assessment/Plan:    Active Hospital Problems    Diagnosis     Severe malnutrition ([facility] [E43]      Priority: Medium    Altered mental status [R41.82] Priority: Medium    ESRD (end stage renal disease) ([facility] [N18.6]      Priority: Medium    Acute CVA (cerebrovascular accident) ([facility] [I63.9]      Priority: Medium    Left carotid stenosis [I65.22]      Priority: Medium    Severe aortic stenosis [I35.0]      Priority: Medium    Elevated troponin [R77.8]      Priority: Medium    NSTEMI (non-ST elevated myocardial infarction) [facility] [I21.4]     Hypothyroidism [E03.9]     Essential hypertension [I10]     Type 2 diabetes mellitus with renal complication (HCC) [F51.80]      Acute bilateral CVAs:  - CT head in the ED showed no acute intracranial abnormality.   - CTA head and neck showed severe > 70% stenosis of the left ICA. - MRI brain showed 4 punctate areas of restricted diffusion consistent with acute areas of infarct -- left periventricular white matter, right parietal lobe, right occipital lobe and left occipital lobe -- embolic source should be considered. Cereal atrophy, moderate chronic small vessel ischemic changes, remote infarct in the right cerebellum.   - ECHO 5/19/22 showed normal LVEF of 55-60%, no RWMAs, G1DD with normal filling pressure, mod-severe AS.  - Continue to monitor pt on telemetry.   - Neurology consulted/following. Will need cardiac event monitor at discharge to evaluate for atrial fibrillation.   - PT/OT/SLP evaluations. Recs for SNF.   - Continue plavix and statin.   -Palliative care consulted for goals of care given above      Left ICA stenosis:  - Vascular surgery consulted. Recommend medical management, not a good surgical candidate. Does not feel left ICA stenosis is source of CVA but will need to be addressed in future pending recovery.      Elevated troponin:   - Similar to prior, elevation is likely demand ischemia in setting of ESRD, severe AS. CVA could be contributing. No CP/SOB.  Had has recent work-up including Stess and ECHO.      ESRD on hemodialysis T/Th/S:  - Nephrology consulted/following.   - On nephrocaps, elemental calcium, sevelamer and retacrit.      Anemia, acute on chronic:  - Hgb dipped to 6.6 on 6/11. S/p 1 unit PRBC. H&H remains stable. - No overt signs of bleeding. FOBT was negative in the recent past.   - Suspect due to ESRD. Pt is on VIRAJ per Nephro. - GI consulted/following, now signed off. No scope planned as of now unless overt signs of bleeding are observed. - Continue PPI BID.      Severe aortic stenosis:   - Plan for outpatient TAVR evaluation.   - Family wished to f/u with [address] [place] [address] cardiology, appointment scheduled already.     Diabetes mellitus type 2:  - Controlled, A1C 5.9.  - On no meds, diet controlled.      Hypertension, benign:   - Controlled. Continue current medication regimen, monitor and adjust as needed.      Hypothyroidism:   - TSH 96.09 with low/normal FT4 on 5/21/22. Plan was to increase his Synthroid to 150 mcg from 137 mcg during his May hospital admission, however it appears he was dc'd on 137 mcg.   - Continue levothyroxine 137 mcg for now. Repeat TSH sent on 6/15. If elevated would recommend increasing the dose.      Lethargy:  - Has been an ongoing issue since his initial stroke per his daughter.   - Could be secondary to CVAs in setting of HD.   - He is not on any sedating meds. - VBG unremarkable. Ammonia is normal. Check TSH. Repeat head CT today. Recent B12/folate were normal.        Leukocytosis:  - Etiology unclear. Persistently elevated since May '22.   - Pt is afebrile. No overt signs of infection. CXR 6/15 negative for infiltrate. Pt does not make urine therefore will not send UA/culture. Check blood cx's. Send C. Diff if he develops diarrhea. Procal elevated but not helpful in context of ESRD. - Trend CBC       History of CHRIS:  - Per pt's daughter. Not on CPAP.      Goals of care:  - Addressed code status / goals of care with his daughter Francine Harp today. Pt's wife had valve replacement today. Plan to continue medical treatment. Pt is to remain a full code.  Updated patient daughter this AM, patient's wife released from hospital today, planning on goals of care discussion tomorrow with Palliative care    DVT Prophylaxis: SCDs   Diet: Diet NPO  Code Status: Full Code    PT/OT Eval Status: SNF    Dispo - Pending clinical course     José Manuel Acosta

## 2022-06-16 NOTE — CONSULTS
PALLIATIVE MEDICINE CONSULTATION     Patient name:Mekhi Morton   DAA:[id number]    :1950  Room/Bed:0361/0361-01   LOS: 6 days         Date of consult:2022    Consult Information  Palliative Medicine Consult performed by: VIVIEN Pradhan CNP      Consults  Reason for consult:  Goals of care  Number of admissions past 12 months: 2      ASSESSMENT/RECOMMENDATIONS     66 y.o. male with ESRD on dialysis, multiple CVAs      Symptom Management:  Goals of Care - Currently full code. Family has not been able to make [address] decisions yet due to patient's wife being in the hospital herself. Aiming to talk to wife and daughter about [address] by Friday afternoon. Patient does not participate with my questioning about goals. Malnutrition - albumin 2.3, total protein 5.8. BMI is 23. Per chart, almost 20 lb weight loss just prior to this admission. Debility - due to CVA-associated weakness and co-morbid conditions. Came from SNF for prior CVA rehab.  + falls. Full assist now. Poor oral intake. On dialysis    Patient/Family Goals of Care :    22    Met with patient at bedside. He opens eyes to name, but is nonverbal and does not move his body or head from his side-lying position. He does follow very basic commands for me. I asked him to squeeze my hand if he had pain and he did not squeeze. I asked him to squeeze my hand if he wanted me to leave him alone and he squeezed my hand. Called daughter, Nirav Gore. She has spoken with the hospitalist PA today and is updated on the current medical status for patient. She is unsure yet what the [address] are as she says she has not been able to have real conversation with mom yet. Kori Peacock Her mother/patient's wife is currently admitted for a surgery but will hopefully be discharged today and be able to come and see patient. She does not want to make any decisions without mom.   Daughter says that mom's father had a similar course with CVA and feeding tube and had a poor outcome, so she believes wife understands what patient is up against.  She does not know if wife will want to try a feeding tube and SNF or not. She is not sure herself what is best.  She initially wanted patient  to go see his [facility] cardiologist to see if that could help identify and treat his fatigue, but now she is not sure he could even do this. She would like to defer this conversation about C[place]orro Mosqueda until mom is out of the hospital (later today or tomorrow) and can take part. She says as of right now she wants patient to remain a full code. Disposition/Discharge Plan:   - pending medical course and GOC  -An outpatient [facility] referral may be appropriate for post-discharge follow up when patient returns home (palliative to follow)    Advance Directives:  Surrogate Decision Maker: Wife - Shant Briggs. Shant Briggs is in the hospital herself leaving dtr Lauren. Code status:  Full Code      Palliative Performance Scale:     [] 60%  Amb reduced; Sig dz. Can't do hobbies/housework; Intake normal or reduced, Occasional assist; LOC full/confusion   [] 50%  Mainly sit/lie; Extensive disease. Mainly assist, Intake normal or reduced; Occasional assist; LOC full/confusion   [] 40%  Mainly in bed; Extensive disease; Mainly assist; Intake normal or reduced; Occasional assist; LOC full/confusion   [] 30%  Bed bound, Extensive disease; Total care; Intake reduced; LOC full/confusion   [x] 20%  Bed bound; Extensive disease; Total care; Intake minimal; Drowsy/coma   [] 10%  Bed bound; Extensive disease; Total care; Mouth care only; Drowsy/coma   []  0%   Death      Case discussed with: patient, floor RN, attending PA  Thank you for allowing us to participate in the care of this patient. HISTORY     CC: weakness  HPI: The patient is a 66 y.o. male with complaints of right sided weakness. PMHx significant for DM2, ESRD (started HD in April '21), HTN, recent admission for CVA.  Prior work-up included

## 2022-06-17 VITALS
BODY MASS INDEX: 23.23 KG/M2 | DIASTOLIC BLOOD PRESSURE: 51 MMHG | TEMPERATURE: 97 F | HEIGHT: 70 IN | WEIGHT: 162.26 LBS | SYSTOLIC BLOOD PRESSURE: 101 MMHG | RESPIRATION RATE: 18 BRPM | OXYGEN SATURATION: 99 % | HEART RATE: 87 BPM

## 2022-06-17 LAB
ALBUMIN SERPL-MCNC: 2.3 G/DL (ref 3.4–5)
ANION GAP SERPL CALCULATED.3IONS-SCNC: 13 MMOL/L (ref 3–16)
BUN BLDV-MCNC: 36 MG/DL (ref 7–20)
CALCIUM SERPL-MCNC: 9.2 MG/DL (ref 8.3–10.6)
CHLORIDE BLD-SCNC: 100 MMOL/L (ref 99–110)
CO2: 25 MMOL/L (ref 21–32)
CREAT SERPL-MCNC: 4.1 MG/DL (ref 0.8–1.3)
GFR AFRICAN AMERICAN: 17
GFR NON-AFRICAN AMERICAN: 14
GLUCOSE BLD-MCNC: 113 MG/DL (ref 70–99)
HCT VFR BLD CALC: 22.7 % (ref 40.5–52.5)
HEMOGLOBIN: 7.1 G/DL (ref 13.5–17.5)
MCH RBC QN AUTO: 27.2 PG (ref 26–34)
MCHC RBC AUTO-ENTMCNC: 31.2 G/DL (ref 31–36)
MCV RBC AUTO: 87.2 FL (ref 80–100)
PDW BLD-RTO: 18.4 % (ref 12.4–15.4)
PHOSPHORUS: 2.1 MG/DL (ref 2.5–4.9)
PLATELET # BLD: 387 K/UL (ref 135–450)
PMV BLD AUTO: 7.4 FL (ref 5–10.5)
POTASSIUM SERPL-SCNC: 4.7 MMOL/L (ref 3.5–5.1)
RBC # BLD: 2.61 M/UL (ref 4.2–5.9)
SODIUM BLD-SCNC: 138 MMOL/L (ref 136–145)
WBC # BLD: 15.1 K/UL (ref 4–11)

## 2022-06-17 PROCEDURE — 80069 RENAL FUNCTION PANEL: CPT

## 2022-06-17 PROCEDURE — 2700000000 HC OXYGEN THERAPY PER DAY

## 2022-06-17 PROCEDURE — 94761 N-INVAS EAR/PLS OXIMETRY MLT: CPT

## 2022-06-17 PROCEDURE — 85027 COMPLETE CBC AUTOMATED: CPT

## 2022-06-17 PROCEDURE — C9113 INJ PANTOPRAZOLE SODIUM, VIA: HCPCS | Performed by: INTERNAL MEDICINE

## 2022-06-17 PROCEDURE — 6360000002 HC RX W HCPCS: Performed by: INTERNAL MEDICINE

## 2022-06-17 PROCEDURE — 36415 COLL VENOUS BLD VENIPUNCTURE: CPT

## 2022-06-17 RX ORDER — MORPHINE SULFATE 20 MG/ML
5 SOLUTION ORAL
Status: DISCONTINUED | OUTPATIENT
Start: 2022-06-17 | End: 2022-06-17 | Stop reason: HOSPADM

## 2022-06-17 RX ORDER — ACETAMINOPHEN 650 MG/1
650 SUPPOSITORY RECTAL EVERY 4 HOURS PRN
Status: DISCONTINUED | OUTPATIENT
Start: 2022-06-17 | End: 2022-06-17 | Stop reason: HOSPADM

## 2022-06-17 RX ORDER — LORAZEPAM 2 MG/ML
1 CONCENTRATE ORAL
Status: DISCONTINUED | OUTPATIENT
Start: 2022-06-17 | End: 2022-06-17 | Stop reason: HOSPADM

## 2022-06-17 RX ORDER — PROMETHAZINE HYDROCHLORIDE 25 MG/1
25 SUPPOSITORY RECTAL EVERY 4 HOURS PRN
Status: DISCONTINUED | OUTPATIENT
Start: 2022-06-17 | End: 2022-06-17 | Stop reason: HOSPADM

## 2022-06-17 RX ADMIN — PANTOPRAZOLE SODIUM 40 MG: 40 INJECTION, POWDER, FOR SOLUTION INTRAVENOUS at 11:35

## 2022-06-17 ASSESSMENT — PAIN SCALES - GENERAL
PAINLEVEL_OUTOF10: 0

## 2022-06-17 ASSESSMENT — PAIN SCALES - WONG BAKER: WONGBAKER_NUMERICALRESPONSE: 0

## 2022-06-17 NOTE — CARE COORDINATION
Has DC plan [facility]. Palliative care to see. Discussed w Rich Jones Hagarys [address] 78- likely no DC today. Varsha Oshea RN     [phone] [facility]. CM following.   Varsha Oshea RN

## 2022-06-17 NOTE — FLOWSHEET NOTE
Treatment time: 3 hrs    Net UF: 1 liter    Pre weight: 74.6 kg  Post weight: 73.6 kg  EDW: TBD    Access used: RCW TDC  Access function: Arterial pressures at times to -230. Reversed and flushed. . Medications or blood products given: Retacrit    Regular outpatient schedule: Bo STILES    Summary of response to treatment: Pt tolerated treatment hemodynamically. Issues with catheter and BFR. Avg -225 during treatment. Flushed, repositioned, reversed lines, no resolve. System clotted after 2:30 of treatment, reset up and finished treatment. Crit Line: Initial Hct: 24.7;   Unable to obtain crit line info secondary to system clotting and crit line inaccuracy of data.        Copy of dialysis treatment record placed in chart, to be scanned into EMR.       06/16/22 1936 06/16/22 2305   Treatment   Time On 1936  --    Time Off  --  2258   Vital Signs   /72 120/61   Heart Rate 92 99   Weight 164 lb 7.4 oz (74.6 kg) 162 lb 4.1 oz (73.6 kg)   Weight Method Bed scale Bed scale   Post-Hemodialysis Assessment   Total Liters Processed (l/min)  --  47.4 l/min   Duration of Treatment (minutes)  --  180 minutes   NET Removed (ml)  --  1000   Dialysis Bath   K+ (Potassium) 3  --    Ca+ (Calcium) 2.5  --    Na+ (Sodium) 138  --    HCO3 (Bicarb) 34  --

## 2022-06-17 NOTE — PROGRESS NOTES
[facility] Progress Note           Subjective/   66y.o. year old male who we are seeing in consultation for ESKD on HD. HPI:  Plans for hospice care with withdrawal from HD , noted      ROS:no CP/SOB    SOC: no visitors    Objective/   GEN:  Chronically ill, BP (!) 110/52   Pulse 92   Temp 98.1 °F (36.7 °C) (Axillary)   Resp 16   Ht 5' 10\" (1.778 m)   Wt 162 lb 4.1 oz (73.6 kg)   SpO2 99%   BMI 23.28 kg/m²   HEENT: non-icteric, no JVD  CV: S1, S2 with +amanda, no r/g; no LE edema  RESP: CTA B without w/r/r; breathing wnl  ABD: +bs, soft, nt, no hsm  SKIN: warm, no rashes  ACCESS: [facility] [place]    Data/  Recent Labs     06/15/22  0643 06/16/22  0659 06/17/22  0628   WBC 16.1* 14.1* 15.1*   HGB 7.7* 7.5* 7.1*   HCT 23.8* 23.7* 22.7*   MCV 84.9 87.8 87.2   * 446 387     Recent Labs     06/15/22  0643 06/16/22  0700 06/17/22  0628    136 138   K 4.6 4.6 4.7    100 100   CO2 25 23 25   GLUCOSE 135* 122* 113*   PHOS 2.8 2.5 2.1*   BUN 35* 54* 36*   CREATININE 4.7* 6.2* 4.1*   LABGLOM 12* 9* 14*   GFRAA 15* 11* 17*       Assessment/     - End stage kidney disease - on HD Tues-Thurs-Sat     - Suspicion for embolic source to recurrent CVA's              Punctate areas noted in the left periventricular white matter, right parietal lobe,               right occipital lobe, and left occipital lobe.  Previous right cerebellar infarct.       - Left carotid artery stenosis     - Aortic stenosis - moderate to severe     - Hypertension     - DM2     - Anemia - VIRAJ with HD, prn prbc's     - Hypothyroidism    - Malnutrition - started on IDPN at outpatient HD    Plan/     -hospice care seems appropriate  ____________________________________  Lilia Galvan MD  [facility]  [email or web address]  Office: [phone]

## 2022-06-17 NOTE — PROGRESS NOTES
Hospice [address] held with family an patient to discuss [facility] philosophy and levels of care. Educational brochure provided for the family and patient to review. All contact numbers provided Patient will be transported via [address] [place] [address] Street at [phone].  I have updated the staff      Any questions please call [phone]      Thank you for the referral    Jose HATHAWAY, [facility]

## 2022-06-17 NOTE — PLAN OF CARE
Problem: Discharge Planning  Goal: Discharge to home or other facility with appropriate resources  Outcome: Progressing  Flowsheets (Taken 6/17/2022 3790)  Discharge to home or other facility with appropriate resources: Identify barriers to discharge with patient and caregiver     Problem: Skin/Tissue Integrity  Goal: Absence of new skin breakdown  Description: 1. Monitor for areas of redness and/or skin breakdown  2. Assess vascular access sites hourly  3. Every 4-6 hours minimum:  Change oxygen saturation probe site  4. Every 4-6 hours:  If on nasal continuous positive airway pressure, respiratory therapy assess nares and determine need for appliance change or resting period. Outcome: Progressing     Problem: Safety - Adult  Goal: Free from fall injury  Outcome: Progressing     Problem: Confusion  Goal: Confusion, delirium, dementia, or psychosis is improved or at baseline  Description: INTERVENTIONS:  1. Assess for possible contributors to thought disturbance, including medications, impaired vision or hearing, underlying metabolic abnormalities, dehydration, psychiatric diagnoses, and notify attending LIP  2. [place] high risk fall precautions, as indicated  3. Provide frequent short contacts to provide reality reorientation, refocusing and direction  4. Decrease environmental stimuli, including noise as appropriate  5. Monitor and intervene to maintain adequate nutrition, hydration, elimination, sleep and activity  6. If unable to ensure safety without constant attention obtain sitter and review sitter guidelines with assigned personnel  7.  Initiate Psychosocial CNS and Spiritual Care consult, as indicated  Outcome: Progressing  Flowsheets (Taken 6/17/2022 5129)  Effect of thought disturbance (confusion, delirium, dementia, or psychosis) are managed with adequate functional status: Assess for contributors to thought disturbance, including medications, impaired vision or hearing, underlying metabolic abnormalities, dehydration, psychiatric diagnoses, notify LIP     Problem: Neurosensory - Adult  Goal: Achieves stable or improved neurological status  Outcome: Progressing  Flowsheets (Taken 6/17/2022 0922)  Achieves stable or improved neurological status: Assess for and report changes in neurological status     Problem: Musculoskeletal - Adult  Goal: Return mobility to safest level of function  Outcome: Progressing  Flowsheets (Taken 6/17/2022 2285)  Return Mobility to Safest Level of Function: Assess patient stability and activity tolerance for standing, transferring and ambulating with or without assistive devices     Problem: Genitourinary - Adult  Goal: Urinary catheter remains patent  Outcome: Progressing  Flowsheets (Taken 6/17/2022 0421)  Urinary catheter remains patent: Assess patency of urinary catheter     Problem: Metabolic/Fluid and Electrolytes - Adult  Goal: Electrolytes maintained within normal limits  Outcome: Progressing  Flowsheets (Taken 6/17/2022 0739)  Electrolytes maintained within normal limits: Monitor labs and assess patient for signs and symptoms of electrolyte imbalances     Problem: Hematologic - Adult  Goal: Maintains hematologic stability  Outcome: Progressing  Flowsheets (Taken 6/17/2022 0922)  Maintains hematologic stability: Assess for signs and symptoms of bleeding or hemorrhage     Problem: Skin/Tissue Integrity - Adult  Goal: Skin integrity remains intact  Outcome: Progressing  Flowsheets (Taken 6/17/2022 0922)  Skin Integrity Remains Intact: Monitor for areas of redness and/or skin breakdown     Problem: Infection - Adult  Goal: Absence of infection at discharge  Outcome: Progressing  Flowsheets (Taken 6/17/2022 8471)  Absence of infection at discharge: Assess and monitor for signs and symptoms of infection     Problem: Pain  Goal: Verbalizes/displays adequate comfort level or baseline comfort level  Outcome: Progressing  Flowsheets (Taken 6/17/2022 0915)  Verbalizes/displays adequate comfort level or baseline comfort level: Assess pain using appropriate pain scale     Problem: Chronic Conditions and Co-morbidities  Goal: Patient's chronic conditions and co-morbidity symptoms are monitored and maintained or improved  Outcome: Progressing  Flowsheets (Taken 6/17/2022 0922)  Care Plan - Patient's Chronic Conditions and Co-Morbidity Symptoms are Monitored and Maintained or Improved: Monitor and assess patient's chronic conditions and comorbid symptoms for stability, deterioration, or improvement     Problem: Nutrition Deficit:  Goal: Optimize nutritional status  Outcome: Progressing     Problem: ABCDS Injury Assessment  Goal: Absence of physical injury  Outcome: Progressing

## 2022-06-17 NOTE — PROGRESS NOTES
Comprehensive Nutrition Assessment    Type and Reason for Visit:  Reassess    Nutrition Recommendations/Plan:   1. Continue current diet and ONS as tolerated  2. Please consult RD if further nutrition intervention is desired. Malnutrition Assessment:  Malnutrition Status:  Severe malnutrition (06/15/22 1525)    Context:  Acute Illness     Findings of the 6 clinical characteristics of malnutrition:  Energy Intake:  50% or less of estimated energy requirements for 5 or more days  Weight Loss:  Greater than 5% over 1 month     Body Fat Loss:  Mild body fat loss Orbital   Muscle Mass Loss:  Mild muscle mass loss Temples (temporalis),Clavicles (pectoralis & deltoids)  Fluid Accumulation:  Mild     Strength:  Not Performed    Nutrition Assessment:    Follow up: Per chart review, PO intakes remain minimal. Family now wanting to shift focus to comfort care. Plan to d/c with HOC to [address]. D/t hospice status, will follow pt at low nutrition risk. Please consult RD if further nutrition intervention is desired. Nutrition Related Findings:    BM x4 on 6/16 Wound Type: None       Current Nutrition Intake & Therapies:    Average Meal Intake: 1-25%,0%  Average Supplements Intake: 0% (per RN)  Diet NPO    Anthropometric Measures:  Height: 5' 10\" (177.8 cm)  Ideal Body Weight (IBW): 166 lbs (75 kg)       Current Body Weight: 162 lb 4.1 oz (73.6 kg), 97.6 % IBW.  Weight Source: Bed Scale  Current BMI (kg/m2): 23.3  Usual Body Weight: 191 lb (86.6 kg) (5/19/22 bed scale)  % Weight Change (Calculated): -11.5                    BMI Categories: Normal Weight (BMI 22.0 to 24.9) age over 72    Estimated Daily Nutrient Needs:  Energy Requirements Based On: Kcal/kg (25-30)  Weight Used for Energy Requirements: Current  Energy (kcal/day): 9185-8947  Weight Used for Protein Requirements: Current (1-1.2)  Protein (g/day): 77-92  Method Used for Fluid Requirements: 1 ml/kcal  Fluid (ml/day): 7754-6678    Nutrition Diagnosis:   · Severe malnutrition related to inadequate protein-energy intake as evidenced by intake 0-25%,Criteria as identified in malnutrition assessment,weight loss      Nutrition Interventions:   Food and/or Nutrient Delivery: Continue Current Diet,Continue Oral Nutrition Supplement  Nutrition Education/Counseling: No recommendation at this time  Coordination of Nutrition Care: Continue to monitor while inpatient  Plan of Care discussed with: RN    Goals:  Previous Goal Met: No Progress toward Goal(s)  Goals: PO intake 50% or greater,prior to discharge       Nutrition Monitoring and Evaluation:   Behavioral-Environmental Outcomes: None Identified  Food/Nutrient Intake Outcomes: Food and Nutrient Intake,Supplement Intake  Physical Signs/Symptoms Outcomes: Weight,Nutrition Focused Physical Findings,Biochemical Data    Discharge Planning:    Continue current diet,Continue Oral Nutrition Supplement     Laurel Li RD, LD  Contact: [phone]

## 2022-06-17 NOTE — PROGRESS NOTES
Patient discharged. Telemetry box and leads removed and returned. Lockbox emptied. All belongings gathered and returned to ambulance staff. Discharge instructions reviewed with patient, all questions answered by RN. No further needs.

## 2022-06-17 NOTE — FLOWSHEET NOTE
06/16/22 2300   Assessment   Charting Type Shift assessment   Psychosocial   Psychosocial (WDL) X   Family Behaviors Lethargic;Cooperative   Neurological   Neuro (WDL) X   Level of Consciousness Responds to voice (1)   Orientation Level Oriented to person;Disoriented to place; Disoriented to time   Cognition Follows commands;Poor attention/concentration;Poor safety awareness   Speech Delayed responses   R Pupil Size (mm) 3   R Pupil Shape Round   R Pupil Reaction Brisk   L Pupil Size (mm) 3   L Pupil Shape Round   L Pupil Reaction Brisk   R Hand  SOFIA  (pt. lethargic)   L Hand  SOFIA   R Foot Dorsiflexion SOFIA   L Foot Dorsiflexion SOFIA   R Foot Plantar Flexion SOFIA  (pt. lethargic)   L Foot Plantar Flexion SOFIA   RUE Motor Response Other (comment)  (sofia)   LUE Motor Response Other (comment)  (sofia)   RLE Motor Response Other (comment)  (sofia)   LLE Motor Response Other (comment)  (sofia)   Gag Present   Cough Reflex Present   NIHSS Stroke Scale   NIHSS Stroke Scale Assessed Yes   Interval Reassessment   Level of Consciousness (1a) 0   LOC Questions (1b) 2   LOC Commands (1c) 2   Best Gaze (2) 0   Visual (3) 0   Facial Palsy (4) (!) 1   Motor Arm, Left (5a) 0   Motor Arm, Right (5b) 0   Motor Leg, Left (6a) 1   Motor Leg, Right (6b) 1   Limb Ataxia (7) 0   Sensory (8) (!) 1   Best Language (9) 1   Dysarthria (10) 0   Extinction and Inattention (11) 0   Total 9   HEENT (Head, Ears, Eyes, Nose, & Throat)   HEENT (WDL) X   Right Eye Glasses   Left Eye Glasses   Teeth Dentures upper   Respiratory   Respiratory (WDL) WDL   Respiratory Pattern Regular   Respiratory Depth Normal   Respiratory Quality/Effort Unlabored   Chest Assessment Chest expansion symmetrical   L Breath Sounds Diminished   R Breath Sounds Diminished   Breath Sounds   Right Upper Lobe Diminished   Right Middle Lobe Diminished   Right Lower Lobe Diminished   Left Upper Lobe Diminished   Left Lower Lobe Diminished   Cardiac   Cardiac (WDL) WDL   Cardiac

## 2022-06-17 NOTE — CARE COORDINATION
Case Management Discharge Summary- Hospice Discharge    Destination: [facility] name and contact: [facility]    Durable Equipment arrangements are completed by the Hospice nurse. [facility] DNR signed and placed in discharge packet AND patient's hard chart. (This is required for DNR patients.)    Signed ECOC/AVS faxed to above agency/facility. Transportation arrangements per Tout. Transport agency name and  time: [facility] form completed and signed by:    Transport form placed with discharge packet:     Notified Family: [address] [facility] [address] name: Spouse    Patient's RN notified of plan: Darci Irwin    Note:    Discharging nurse to complete nursing portion of ECOC, reconcile AVS, place final copy with patient's discharge packet. RN to ensure signed prescriptions are sent home with patient or the facility as per nursing protocol.       Tess Aguilar RN

## 2022-06-17 NOTE — DISCHARGE INSTR - COC
Continuity of Care Form    Patient Name: Mike Swenson   :  1950  MRN:  [id number]    Admit date:  6/10/2022  Discharge date:  22    Code Status Order: [facility]   Advance Directives:      Admitting Physician:  No admitting provider for patient encounter. PCP: Carlton Sanches MD    Discharging Nurse: Chan Dias [facility] Unit/Room#: 2365/3870-17  Discharging Unit Phone Number: [phone]    Emergency Contact:   Extended Emergency Contact Information  Primary Emergency Contact: Yulia Mcneal  Address: [address] [place], [address] [place] [address] of [address] [place] [address] Phone: [phone]  Mobile Phone: [phone]  Relation: Spouse  Secondary Emergency Contact: [address] [facility]  Phone: [phone]  Relation: Child    Past Surgical History:  History reviewed. No pertinent surgical history.     Immunization History:   Immunization History   Administered Date(s) Administered    COVID-19, Pfizer Purple top, DILUTE for use, 12+ yrs, 30mcg/0.3mL dose 2021, 2021, 10/03/2021    Influenza, High Dose (Fluzone 65 yrs and older) 11/10/2015, 11/15/2016, 2017    Pneumococcal Conjugate 13-valent ([place] Side) 12/15/2014       Active Problems:  Patient Active Problem List   Diagnosis Code    Type 2 diabetes mellitus with renal complication ([facility] Q96.51    Essential hypertension I10    Hypothyroidism E03.9    Mixed hyperlipidemia E78.2    Tobacco use disorder F17.200    Hyponatremia E87.1    Acute infective pericarditis I30.1    Pulmonary nodule R91.1    Sepsis ([facility] A41.9    ARF (acute renal failure) (HCC) N17.9    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    Anemia of chronic renal failure N18.9, D63.1    NSTEMI (non-ST elevated myocardial infarction) ([facility] I21.4    Chronic kidney disease, stage 5 ([facility] N18.5    Iron deficiency anemia D50.9    Weakness R53.1    Elevated troponin R77.8    Abnormal ECG R94.31    PVC (premature ventricular contraction) I49.3    Murmur R01.1 Therapy:  Current Nutrition Therapy:   - Oral Diet:  Dysphagia 2 mechanically altered-renal    Routes of Feeding: Oral  Liquids: Thin Liquids  Daily Fluid Restriction: no  Last Modified Barium Swallow with Video (Video Swallowing Test): not done    Treatments at the Time of Hospital Discharge:   Respiratory Treatments: none  Oxygen Therapy:  is on oxygen at 1 L/min per nasal cannula. Ventilator:    - No ventilator support    Rehab Therapies: none  Weight Bearing Status/Restrictions: No weight bearing restrictions  Other Medical Equipment (for information only, NOT a DME order):  wheelchair and hospital bed  Other Treatments: none    Patient's personal belongings (please select all that are sent with patient):  None    RN SIGNATURE:  Electronically signed by Santa Amaya RN on 6/17/22 at 3:28 PM EDT    CASE MANAGEMENT/SOCIAL WORK SECTION    Inpatient Status Date: ***    Readmission Risk Assessment Score:  Readmission Risk              Risk of Unplanned Readmission:  25           Discharging to Facility/ Agency   Name:   Address:  Phone:  Fax:    Dialysis Facility (if applicable)   Name:  Address:  Dialysis Schedule:  Phone:  Fax:    / signature: {Esignature:[id number]}    PHYSICIAN SECTION    Prognosis: {Prognosis:5616914107}    Condition at Discharge: [facility] Patient Condition:532437562}    Rehab Potential (if transferring to Rehab): {Prognosis:8849242042}    Recommended Labs or Other Treatments After Discharge: ***    Physician Certification: I certify the above information and transfer of Petty Saenz  is necessary for the continuing treatment of the diagnosis listed and that he requires {Admit to Appropriate Level of Care:74197} for {GREATER/LESS:466641958} 30 days.      Update Admission H&P: {CHP DME Changes in IEPSZ:598077098}    PHYSICIAN SIGNATURE:  {Esignature:[id number]}

## 2022-06-17 NOTE — PROGRESS NOTES
PALLIATIVE MEDICINE PROGRESS NOTE     Patient name:Mekhi Palomino    HRI:[id number] :1950  Room/Bed:0361/0361-01    LOS: 7 days        ASSESSMENT/RECOMMENDATIONS     66 y.o. male with ESRD on dialysis, AS and multiple CVAs    Symptom Management:  Goals of Care- Code status amended to DNR CC. Family would like to shift focus to comfort care. They are interested in hospice and would like him to go as soon as possible. [address] referral placed/called. Family is wanting [address] bed if possible. Malnutrition - albumin 2.3, total protein 5.8. BMI is 23. Per chart, almost 20 lb weight loss just prior to this admission. Debility - due to CVA-associated weakness and co-morbid conditions. Came from SNF for prior CVA rehab.  + falls. Full assist now. Poor oral intake. On dialysis    Patient/Family Goals of Care :    22    Family meeting today with wife Bryson López, son Efrem Khalil and daughter Romelia Farnsworth. Bryson López is the decision maker, but seems to also rely on her children. They all recognize that patient's status continues to decline. He no longer seems to have the will to try to push through. He doesn't want to work with therapy, can't work with therapy and he doesn't want to eat. They don't want to push him any more. They would like to focus on comfort care at this time. We discussed that this would mean stopping dialysis and they are all in agreement to stop immediately. They would like the [address] (due to location) if possible. With patient being such a heavy assist and stopping dialysis, they don't feel like they can manage him or his symptoms at home. They are agreeable to DNR CC code status change. They would like this to happen as soon as possible. Family is appropriately tearful. They state patient would never want to live like this. Offered support and they shared stories about him with me. Wife describes patient as a hard worker.   He is always willing to help out the neighbors whenever needed. He was a good provider to his 3 children. He loves cars. He has model cars. He even spent time as a . His wife breeds dogs and he was supportive of this and also enjoyed the dogs. The dogs have been looking for him at home ever since being away in the hospital.    Spoke with [address] Rep will meet with family    6/16/22     Met with patient at bedside. He opens eyes to name, but is nonverbal and does not move his body or head from his side-lying position. He does follow very basic commands for me. I asked him to squeeze my hand if he had pain and he did not squeeze. I asked him to squeeze my hand if he wanted me to leave him alone and he squeezed my hand.     Called daughter, Lashell Yang. She has spoken with the hospitalist PA today and is updated on the current medical status for patient. She is unsure yet what the [address] are as she says she has not been able to have real conversation with mom yet. Kristie Stands Her mother/patient's wife is currently admitted for a surgery but will hopefully be discharged today and be able to come and see patient. She does not want to make any decisions without mom. Daughter says that mom's father had a similar course with CVA and feeding tube and had a poor outcome, so she believes wife understands what patient is up against.  She does not know if wife will want to try a feeding tube and SNF or not. She is not sure herself what is best.  She initially wanted patient  to go see his [facility] cardiologist to see if that could help identify and treat his fatigue, but now she is not sure he could even do this. She would like to defer this conversation about [address] until mom is out of the hospital (later today or tomorrow) and can take part. She says as of right now she wants patient to remain a full code. Disposition/Discharge Plan :    Hospice - family hoping for an [address] bed. [address] referral made.   Comfort meds ordered    Advance Directives:  Code status: normal.   Cardiovascular:      Rate and Rhythm: Normal rate. Pulses: Normal pulses. Pulmonary:      Effort: Pulmonary effort is normal. No respiratory distress. Breath sounds: No stridor. No wheezing. Abdominal:      General: Abdomen is flat. Palpations: Abdomen is soft. Musculoskeletal:         General: Normal range of motion. Cervical back: Normal range of motion and neck supple. Right lower leg: No edema. Left lower leg: No edema. Skin:     General: Skin is warm and dry. Capillary Refill: Capillary refill takes 2 to 3 seconds. Neurological:      Mental Status: He is alert. Comments: SOFIA.   Does not follow any commands today or interact   Psychiatric:      Comments: calm              Total time: 75 minutes  >50% of time spent counseling patient at bedside or POA/family member if applicable , reviewing information and discussing care, coordinating with care team       Signed By: Electronically signed by VIVIEN Pradhan CNP on 6/17/2022 at 11:20 AM   Palliative Medicine   [phone]    June 17, 2022

## 2022-06-17 NOTE — PROGRESS NOTES
Pt. Cloteal Mires with lethargy and unable to stay alert enough to swallow. MD aware. Family to decide whether they want pt. To be palliative or aggressive care. Will continue to monitor.

## 2022-06-19 LAB
BLOOD CULTURE, ROUTINE: NORMAL
CULTURE, BLOOD 2: NORMAL

## 2022-06-20 NOTE — DISCHARGE SUMMARY
Hospital Medicine Discharge Summary    Patient ID: Thor Vance      Patient's PCP: Emy Agudelo MD    Admit Date: 6/10/2022     Discharge Date: 6/17/2022     Admitting Provider: No admitting provider for patient encounter. Discharge Provider: HILDA Bethea     Discharge Diagnoses: Active Hospital Problems    Diagnosis     Severe malnutrition ([facility] [E43]      Priority: Medium    Altered mental status [R41.82]      Priority: Medium    ESRD (end stage renal disease) ([facility] [N18.6]      Priority: Medium    Acute CVA (cerebrovascular accident) ([facility] [I63.9]      Priority: Medium    Left carotid stenosis [I65.22]      Priority: Medium    Severe aortic stenosis [I35.0]      Priority: Medium    Elevated troponin [R77.8]      Priority: Medium    NSTEMI (non-ST elevated myocardial infarction) ([facility] [I21.4]     Hypothyroidism [E03.9]     Essential hypertension [I10]     Type 2 diabetes mellitus with renal complication ([facility] [W79.62]        The patient was seen and examined on day of discharge and this discharge summary is in conjunction with any daily progress note from day of discharge. Hospital Course:   66 y.o. male who presented to [facility] with right sided weakness. PMHx significant for DM2, ESRD, HTN, recent admission for CVA. Prior work-up included right/posterior CVA by MRI and some left carotid stenosis (not the symptomatic side). Cardiac work-up revealed severe AS and he was referred for TAVR. Discharged to [facility]. He returns after staff found in kneeling on the ground. He apparently had some new weakness on the right side compared to prior. LKW was the evening prior. He was a poor historian on presentation. Stroke team was contacted. CTA showed 70% left ICA stenosis, no acute findings. He was not a candidate for any TPA or intervention.      At time of my evaluation, he reports ongoing subtle weakness of the RUE and RLE. No CP/SOB.  66 y.o. male who presented to [facility] with right sided weakness. PMHx significant for DM2, ESRD, HTN, recent admission for CVA. Prior work-up included right/posterior CVA by MRI and some left carotid stenosis (not the symptomatic side). Cardiac work-up revealed severe AS and he was referred for TAVR. Discharged to [facility]. He returns after staff found in kneeling on the ground. He apparently had some new weakness on the right side compared to prior. LKW was the evening prior. He was a poor historian on presentation. Stroke team was contacted. CTA showed 70% left ICA stenosis, no acute findings. He was not a candidate for any TPA or intervention. Acute bilateral CVAs:  - CT head in the ED showed no acute intracranial abnormality.   - CTA head and neck showed severe > 70% stenosis of the left ICA. - MRI brain showed 4 punctate areas of restricted diffusion consistent with acute areas of infarct -- left periventricular white matter, right parietal lobe, right occipital lobe and left occipital lobe -- embolic source should be considered. Cereal atrophy, moderate chronic small vessel ischemic changes, remote infarct in the right cerebellum.   - ECHO 5/19/22 showed normal LVEF of 55-60%, no RWMAs, G1DD with normal filling pressure, mod-severe AS. - Neurology consulted  - PT/OT/SLP evaluations. Recs for SNF.   - Continue plavix and statin.   -Palliative care consulted for goals of care given above, discharging to inpatient hospice       Left ICA stenosis:  - Vascular surgery consulted. Recommend medical management, not a good surgical candidate. Does not feel left ICA stenosis is source of CVA      Elevated troponin:   - Similar to prior, elevation is likely demand ischemia in setting of ESRD, severe AS. CVA could be contributing. No CP/SOB.  Had has recent work-up including Stess and ECHO.      ESRD on hemodialysis T/Th/S:  - Nephrology consulted/following.   - On nephrocaps, elemental calcium, sevelamer and retacrit.      Anemia, acute on chronic:  - Hgb dipped to 6.6 on 6/11. S/p 1 unit PRBC. H&H remains stable.   - No overt signs of bleeding. FOBT was negative in the recent past.   - Suspect due to ESRD. Pt is on VIRAJ per Nephro. - GI consulted/following, now signed off. No scope planned as of now unless overt signs of bleeding are observed. - Continue PPI BID.      Severe aortic stenosis:      Diabetes mellitus type 2:  - Controlled, A1C 5.9.  - On no meds, diet controlled.      Hypertension, benign:   - Controlled. Continue current medication regimen, monitor and adjust as needed.      Hypothyroidism:   - TSH 96.09 with low/normal FT4 on 5/21/22. Plan was to increase his Synthroid to 150 mcg from 137 mcg during his May hospital admission, however it appears he was dc'd on 137 mcg.   - Continue levothyroxine 137 mcg for now. Repeat TSH sent on 6/15. If elevated would recommend increasing the dose.      Lethargy:  - Has been an ongoing issue since his initial stroke per his daughter.   - Could be secondary to CVAs in setting of HD.   - He is not on any sedating meds. - VBG unremarkable. Ammonia is normal.        Leukocytosis:  - Etiology unclear. Persistently elevated since May '22.   - Pt is afebrile. No overt signs of infection.  CXR 6/15 negative for infiltrate. Pt does not make urine therefore will not send UA/culture. Check blood cx's. Send C. Diff if he develops diarrhea. Procal elevated but not helpful in context of ESRD.        History of CHRIS:  - Per pt's daughter. Not on CPAP.      Family met with palliative care and decided to pursue inpatient hospice given patient's new stroke and declining mental status/nutritional status. Would like to keep patient comfortable and not pursue aggressive treatment.            Physical Exam Performed:     BP (!) 101/51   Pulse 87   Temp 97 °F (36.1 °C) (Axillary)   Resp 18   Ht 5' 10\" (1.778 m)   Wt 162 lb 4.1 oz (73.6 kg)   SpO2 99%   BMI 23.28 kg/m²       General appearance:  Frail elderly male, No apparent distress, appears stated age and cooperative. HEENT:  Normal cephalic, atraumatic without obvious deformity. Pupils equal, round, and reactive to light. Extra ocular muscles intact. Conjunctivae/corneas clear. Neck: Supple, with full range of motion. No jugular venous distention. Trachea midline. Respiratory:  Normal respiratory effort. Clear to auscultation, bilaterally without Rales/Wheezes/Rhonchi. Cardiovascular:  Regular rate and rhythm with normal S1/S2 without murmurs, rubs or gallops. Abdomen: Soft, non-tender, non-distended with normal bowel sounds. Musculoskeletal:  No clubbing, cyanosis or edema bilaterally. Full range of motion without deformity. Skin: Skin color, texture, turgor normal.  No rashes or lesions. Neurologic: Diffuse weakness in all 4 extremities, unable to determine sensation, opens eyes to voice    Psychiatric: Severely confused, can only respond with one word answers, does not follow commands,    Capillary Refill: Brisk,< 3 seconds   Peripheral Pulses: +2 palpable, equal bilaterally       Labs: For convenience and continuity at follow-up the following most recent labs are provided:      CBC:    Lab Results   Component Value Date    WBC 15.1 06/17/2022    HGB 7.1 06/17/2022    HCT 22.7 06/17/2022     06/17/2022       Renal:    Lab Results   Component Value Date     06/17/2022    K 4.7 06/17/2022    K 3.7 06/12/2022     06/17/2022    CO2 25 06/17/2022    BUN 36 06/17/2022    CREATININE 4.1 06/17/2022    CALCIUM 9.2 06/17/2022    PHOS 2.1 06/17/2022         Significant Diagnostic Studies    Radiology:   CT HEAD WO CONTRAST   Final Result   Atrophy and small-vessel ischemic change, similar to prior. No hemorrhage or   mass noted         XR CHEST PORTABLE   Final Result   No acute cardiopulmonary disease is identified. Stable enlargement of cardiac silhouette.          MRI BRAIN WO CONTRAST   Final Result   There are approximately 4 punctate areas of restricted diffusion consistent   with acute areas of infarct. There is a punctate area of restricted   diffusion in the left periventricular white matter, the right parietal lobe,   the right occipital lobe, and the left occipital lobe. An embolic source   should be considered. Cerebral atrophy. Moderate chronic small vessel ischemic changes. Remote   infarct in the right cerebellum. XR CHEST PORTABLE   Final Result   Mild vascular congestion is stable. CTA HEAD NECK W CONTRAST   Final Result   1. Severe (greater than 70%) stenosis of the proximal left ICA. 2. The right vertebral artery V4 segment after the PICA takeoff is not well   visualized and may be occluded or congenitally diminutive. 3. The basilar artery appears diminutive, may in part be congenital given   presence of bilateral posterior communicating arteries. 4. The remaining major intracranial arteries appear patent without   significant stenosis. 5. Multilevel cervical spondylosis with associated severe neural foraminal   narrowing as well as C5-C6 spinal canal stenosis. 6. Scattered periapical lucency such as around the left lower central incisor   and right lower 1st molar. Can correlate with dental examination. CT HEAD WO CONTRAST   Final Result   No acute intracranial abnormality. The findings were sent to the Radiology Results [address] [place] [address] at 8:43   am on 6/10/2022 to be communicated to a licensed caregiver.                 Consults:     IP CONSULT TO NEPHROLOGY  IP CONSULT TO VASCULAR SURGERY  IP CONSULT TO GI  IP CONSULT TO HOSPICE    Disposition:  Inpatient hospice     Condition at Discharge: Terminal    Discharge Instructions/Follow-up:    Discharge to hospice     Code Status:  Prior DNR CC     Activity: activity as tolerated    Diet: regular diet      Discharge Medications:     Discharge Medication List as of 6/17/2022  3:28 PM           Details   epoetin katelynn (EPOGEN;PROCRIT) 4000 UNIT/ML injection Inject 8,000 Units into the skin three times a week T, Th, S with dialysis. Historical Med      doxazosin (CARDURA) 1 MG tablet Take 1 tablet by mouth daily, Disp-30 tablet, R-0NO PRINT      clopidogrel (PLAVIX) 75 MG tablet Take 1 tablet by mouth daily, Disp-30 tablet, R-1NO PRINT      megestrol (MEGACE) 40 MG/ML suspension Take 15.6 mLs by mouth daily, Disp-240 mL, R-1NO PRINT      sevelamer (RENVELA) 800 MG tablet Take 1 tablet by mouth 3 times daily (with meals)Historical Med      carvedilol (COREG) 3.125 MG tablet Take 1 tablet by mouth 2 times daily (with meals), Disp-60 tablet, R-3Normal      atorvastatin (LIPITOR) 20 MG tablet Take 1 tablet by mouth daily, Disp-30 tablet, R-3Normal      nitroGLYCERIN (NITROSTAT) 0.4 MG SL tablet up to max of 3 total doses. If no relief after 1 dose, call 911., Disp-25 tablet, R-3Normal      calcium elemental (OSCAL) 500 MG TABS tablet Take 1 tablet by mouth daily, Disp-30 tablet, R-3Normal      levothyroxine (SYNTHROID) 125 MCG tablet Take 137 mcg by mouth Daily Historical Med             Time Spent on discharge is more than 30 minutes in the examination, evaluation, counseling and review of medications and discharge plan. Signed:    HILDA Julien   6/20/2022      Thank you Nancy Kay MD for the opportunity to be involved in this patient's care. If you have any questions or concerns, please feel free to contact me at [phone].

## 2022-07-10 PROBLEM — R77.8 ELEVATED TROPONIN: Status: RESOLVED | Noted: 2022-05-17 | Resolved: 2022-07-10
